# Patient Record
Sex: MALE | Race: WHITE | NOT HISPANIC OR LATINO | Employment: FULL TIME | URBAN - METROPOLITAN AREA
[De-identification: names, ages, dates, MRNs, and addresses within clinical notes are randomized per-mention and may not be internally consistent; named-entity substitution may affect disease eponyms.]

---

## 2017-04-27 ENCOUNTER — OFFICE VISIT (OUTPATIENT)
Dept: INTERNAL MEDICINE | Facility: CLINIC | Age: 57
End: 2017-04-27

## 2017-04-27 VITALS
HEIGHT: 72 IN | WEIGHT: 223 LBS | DIASTOLIC BLOOD PRESSURE: 72 MMHG | BODY MASS INDEX: 30.2 KG/M2 | HEART RATE: 71 BPM | SYSTOLIC BLOOD PRESSURE: 112 MMHG | OXYGEN SATURATION: 97 %

## 2017-04-27 DIAGNOSIS — I10 ESSENTIAL HYPERTENSION: ICD-10-CM

## 2017-04-27 DIAGNOSIS — I25.10 CORONARY ARTERY DISEASE INVOLVING NATIVE CORONARY ARTERY OF NATIVE HEART WITHOUT ANGINA PECTORIS: ICD-10-CM

## 2017-04-27 DIAGNOSIS — Z13.29 SCREENING FOR HYPOTHYROIDISM: ICD-10-CM

## 2017-04-27 DIAGNOSIS — R25.2 MUSCLE CRAMPS: Primary | ICD-10-CM

## 2017-04-27 DIAGNOSIS — E11.9 TYPE 2 DIABETES MELLITUS WITHOUT COMPLICATION, WITHOUT LONG-TERM CURRENT USE OF INSULIN (HCC): ICD-10-CM

## 2017-04-27 DIAGNOSIS — E78.5 HYPERLIPIDEMIA, UNSPECIFIED HYPERLIPIDEMIA TYPE: ICD-10-CM

## 2017-04-27 DIAGNOSIS — Z11.59 NEED FOR HEPATITIS C SCREENING TEST: ICD-10-CM

## 2017-04-27 PROBLEM — I25.5 ISCHEMIC DILATED CARDIOMYOPATHY: Status: ACTIVE | Noted: 2017-04-27

## 2017-04-27 PROBLEM — S83.512A RUPTURE OF ANTERIOR CRUCIATE LIGAMENT OF LEFT KNEE: Status: ACTIVE | Noted: 2017-04-27

## 2017-04-27 PROBLEM — I42.0 ISCHEMIC DILATED CARDIOMYOPATHY: Status: ACTIVE | Noted: 2017-04-27

## 2017-04-27 PROBLEM — Z95.5 S/P CORONARY ARTERY STENT PLACEMENT: Status: ACTIVE | Noted: 2017-04-27

## 2017-04-27 PROBLEM — N52.2 DRUG-INDUCED ERECTILE DYSFUNCTION: Status: ACTIVE | Noted: 2017-04-27

## 2017-04-27 PROBLEM — G47.33 OSA (OBSTRUCTIVE SLEEP APNEA): Status: ACTIVE | Noted: 2017-04-27

## 2017-04-27 PROBLEM — M17.0 OSTEOARTHRITIS OF BOTH KNEES: Status: ACTIVE | Noted: 2017-04-27

## 2017-04-27 PROBLEM — I25.2 PERSONAL HISTORY OF MI (MYOCARDIAL INFARCTION): Status: ACTIVE | Noted: 2017-04-27

## 2017-04-27 PROBLEM — S83.519A ACL (ANTERIOR CRUCIATE LIGAMENT) TEAR: Status: ACTIVE | Noted: 2017-04-27

## 2017-04-27 PROCEDURE — 99204 OFFICE O/P NEW MOD 45 MIN: CPT | Performed by: INTERNAL MEDICINE

## 2017-04-27 RX ORDER — LISINOPRIL 5 MG/1
5 TABLET ORAL DAILY
COMMUNITY
End: 2017-07-18 | Stop reason: SDUPTHER

## 2017-04-27 RX ORDER — CARVEDILOL 6.25 MG/1
6.25 TABLET ORAL 2 TIMES DAILY WITH MEALS
COMMUNITY
End: 2017-07-18 | Stop reason: SDUPTHER

## 2017-04-27 RX ORDER — TADALAFIL 5 MG/1
5 TABLET ORAL DAILY PRN
COMMUNITY
End: 2017-07-05 | Stop reason: SDUPTHER

## 2017-04-27 RX ORDER — ASPIRIN 81 MG/1
81 TABLET ORAL DAILY
COMMUNITY

## 2017-04-27 RX ORDER — FAMOTIDINE 40 MG/1
40 TABLET, FILM COATED ORAL DAILY
COMMUNITY
End: 2017-05-31 | Stop reason: SDUPTHER

## 2017-04-27 NOTE — PROGRESS NOTES
Subjective     Seth Manuel is a 56 y.o. male, who presents with a chief complaint of   Chief Complaint   Patient presents with   • Muscle Pain     ALISTAIR [place].    • Hypertension   • Coronary Artery Disease       HPI Comments: Patient is here to establish care and was previously seen by Dr. Trujillo in [place]. All of these problems are new to me.     CAD s/p stent: Patient had an MI in 2014 and had a stent placed with Dr. Flores in [place]. No CP or SOB currently. He last saw Dr. 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     Cramps in Triceps: He has been on Livalo for one year and gets cramps occasionally. He is currently taking CoQ10. The cramps last about 10 minutes and worse in his triceps and at night.He quit smoking after his heart attack. He has not done anything that makes this better or worse.     HTN: chronic issue and new to me. He does not check it at home. He tolerates the medication well, but doesn't believe that he has high blood pressure    ED: he started having issues with ED after starting the BB, but uses Cialisi everyday    Type 2 Diabetes: chronic issue and new to me. He takes Metformin 500mg BID. He tolerates well without nausea and vomiting and no lows during the day .     AMIRA: chronic issue and diagnosed in late 1990's. This is a new issue to me. He used to use a CPAP, but doesn't anymore. And            The following portions of the patient's history were reviewed and updated as appropriate: allergies, current medications, past family history, past medical history, past social history, past surgical history and problem list.    Allergies: Crestor [rosuvastatin calcium] and Lipitor [atorvastatin]    Current Outpatient Prescriptions:   •  Ascorbic Acid (VITAMIN C PO), Take 3,000 Units by mouth., Disp: , Rfl:   •  aspirin 81 MG EC tablet, Take 81 mg by mouth Daily., Disp: , Rfl:   •   "carvedilol (COREG) 6.25 MG tablet, Take 6.25 mg by mouth 2 (Two) Times a Day With Meals., Disp: , Rfl:   •  Coenzyme Q10 (COQ10 PO), Take 250 mg by mouth 2 (Two) Times a Day., Disp: , Rfl:   •  famotidine (PEPCID) 40 MG tablet, Take 40 mg by mouth Daily., Disp: , Rfl:   •  lisinopril (PRINIVIL,ZESTRIL) 5 MG tablet, Take 5 mg by mouth Daily., Disp: , Rfl:   •  metFORMIN (GLUCOPHAGE) 500 MG tablet, Take 500 mg by mouth 2 (Two) Times a Day With Meals., Disp: , Rfl:   •  pitavastatin calcium (LIVALO) 2 MG tablet tablet, Take 2 mg by mouth Every Night., Disp: , Rfl:   •  tadalafil (CIALIS) 5 MG tablet, Take 5 mg by mouth Daily As Needed for erectile dysfunction., Disp: , Rfl:       Review of Systems   Constitutional: Negative for chills and fever.   HENT: Negative for congestion.    Respiratory: Negative for cough and shortness of breath.    Cardiovascular: Negative for chest pain, palpitations and leg swelling.   Gastrointestinal: Negative for constipation, diarrhea and nausea.   Genitourinary: Negative for difficulty urinating and dysuria.   Musculoskeletal: Positive for arthralgias (Left knee pain and swelling that is chronic ).   Skin: Negative for rash.   Neurological: Negative for headaches.   Psychiatric/Behavioral: Negative for sleep disturbance.       Objective     /72 (BP Location: Left arm, Patient Position: Sitting, Cuff Size: Adult)  Pulse 71  Ht 72\" (182.9 cm)  Wt 223 lb (101 kg)  SpO2 97%  BMI 30.24 kg/m2      Physical Exam   Constitutional: He is oriented to person, place, and time. He appears well-developed and well-nourished. No distress.   HENT:   Head: Normocephalic and atraumatic.   Right Ear: Tympanic membrane and external ear normal.   Left Ear: Tympanic membrane and external ear normal.   Nose: Nose normal.   Mouth/Throat: Oropharynx is clear and moist and mucous membranes are normal. No oropharyngeal exudate. Tonsils are 0 on the right. Tonsils are 0 on the left. No tonsillar exudate. "   Eyes: Conjunctivae are normal. Right eye exhibits no discharge. Left eye exhibits no discharge. No scleral icterus.   Neck: Neck supple.   Cardiovascular: Normal rate, regular rhythm and normal heart sounds.  Exam reveals no gallop and no friction rub.    No murmur heard.  Pulmonary/Chest: Effort normal and breath sounds normal. No respiratory distress. He has no wheezes. He has no rales.   Musculoskeletal: He exhibits no edema or tenderness.   Lymphadenopathy:     He has no cervical adenopathy.   Neurological: He is alert and oriented to person, place, and time.   Skin: Skin is warm. No rash noted.   Psychiatric: He has a normal mood and affect. His behavior is normal.   Nursing note and vitals reviewed.          No results found for this or any previous visit.    Assessment/Plan   Seth was seen today for muscle pain, hypertension and coronary artery disease.    Diagnoses and all orders for this visit:    Muscle cramps  -     Magnesium; Future  -     CK; Future    Hyperlipidemia, unspecified hyperlipidemia type  -     Lipid Panel With LDL / HDL Ratio; Future  -     CBC & Differential; Future  -     Comprehensive Metabolic Panel; Future  -     Lipid Panel With LDL / HDL Ratio; Future    Essential hypertension    Coronary artery disease involving native coronary artery of native heart without angina pectoris  -     Lipid Panel With LDL / HDL Ratio; Future  -     Lipid Panel With LDL / HDL Ratio; Future    Type 2 diabetes mellitus without complication, without long-term current use of insulin  -     CBC & Differential; Future  -     Comprehensive Metabolic Panel; Future  -     Urinalysis With / Culture If Indicated; Future  -     Microalbumin / Creatinine Urine Ratio; Future  -     Hemoglobin A1c; Future  -     Hemoglobin A1c; Future  -     Urinalysis With / Culture If Indicated; Future    Need for hepatitis C screening test  -     HCV Antibody Rfx To Qnt PCR; Future    Screening for hypothyroidism  -     TSH Rfx On  Abnormal To Free T4; Future        HLD/CAD:Patient's cholesterol is under good control per patient. Will continue current regimen of Livalo, ASA and BB. No side effects from medications reported other than some triceps cramping that he is unsure if related to his statin. Will follow up in 6 months to monitor levels. Obtain and review records from Dr. Flores.     HTN:HTN is under good control and patient will continue to monitor with home BP cuff. No side effects from medications. Will continue current regimen of ACE and BB. He is on 5mg of lisinopril and 6.25mg PO BID of coreg. Will follow up in 6 months      Diabetes:Patient's type 2 diabetes is under good control per patients, but has not had levels drawn in 1 year . Will continue current regimen of Metformin 500mg BID.No reported side effects from medications. Will follow up in 6 months. Eye exam is up to date. Needs foot exam at next visit.     Cramps: likely related to Livalo or dehydration. Will check magnesium, potassium and CK to evaluate further.       Return in about 6 months (around 10/27/2017) for Recheck.    Judy Good MD  04/27/2017

## 2017-04-28 ENCOUNTER — RESULTS ENCOUNTER (OUTPATIENT)
Dept: INTERNAL MEDICINE | Facility: CLINIC | Age: 57
End: 2017-04-28

## 2017-04-28 DIAGNOSIS — E78.5 HYPERLIPIDEMIA, UNSPECIFIED HYPERLIPIDEMIA TYPE: ICD-10-CM

## 2017-04-28 DIAGNOSIS — Z11.59 NEED FOR HEPATITIS C SCREENING TEST: ICD-10-CM

## 2017-04-28 DIAGNOSIS — R25.2 MUSCLE CRAMPS: ICD-10-CM

## 2017-04-28 DIAGNOSIS — E11.9 TYPE 2 DIABETES MELLITUS WITHOUT COMPLICATION, WITHOUT LONG-TERM CURRENT USE OF INSULIN (HCC): ICD-10-CM

## 2017-04-28 DIAGNOSIS — Z13.29 SCREENING FOR HYPOTHYROIDISM: ICD-10-CM

## 2017-04-28 DIAGNOSIS — I25.10 CORONARY ARTERY DISEASE INVOLVING NATIVE CORONARY ARTERY OF NATIVE HEART WITHOUT ANGINA PECTORIS: ICD-10-CM

## 2017-04-29 LAB
ALBUMIN SERPL-MCNC: 4.5 G/DL (ref 3.5–5.2)
ALBUMIN/CREAT UR: <3.8 MG/G CREAT (ref 0–30)
ALBUMIN/GLOB SERPL: 2 G/DL
ALP SERPL-CCNC: 46 U/L (ref 40–129)
ALT SERPL-CCNC: 20 U/L (ref 5–41)
APPEARANCE UR: CLEAR
AST SERPL-CCNC: 25 U/L (ref 5–40)
BACTERIA #/AREA URNS HPF: NORMAL /HPF
BASOPHILS # BLD AUTO: 0.06 10*3/MM3 (ref 0–0.2)
BASOPHILS NFR BLD AUTO: 0.8 % (ref 0–2)
BILIRUB SERPL-MCNC: 0.6 MG/DL (ref 0.2–1.2)
BILIRUB UR QL STRIP: NEGATIVE
BUN SERPL-MCNC: 12 MG/DL (ref 6–20)
BUN/CREAT SERPL: 12.5 (ref 7–25)
CALCIUM SERPL-MCNC: 8.9 MG/DL (ref 8.6–10.5)
CHLORIDE SERPL-SCNC: 99 MMOL/L (ref 98–107)
CHOLEST SERPL-MCNC: 196 MG/DL (ref 0–200)
CK SERPL-CCNC: 610 U/L (ref 36–170)
CO2 SERPL-SCNC: 30.4 MMOL/L (ref 22–29)
COLOR UR: YELLOW
CREAT SERPL-MCNC: 0.96 MG/DL (ref 0.76–1.27)
CREAT UR-MCNC: 78.7 MG/DL
EOSINOPHIL # BLD AUTO: 0.34 10*3/MM3 (ref 0.1–0.3)
EOSINOPHIL NFR BLD AUTO: 4.5 % (ref 0–4)
EPI CELLS #/AREA URNS HPF: NORMAL /HPF
ERYTHROCYTE [DISTWIDTH] IN BLOOD BY AUTOMATED COUNT: 13.6 % (ref 11.5–14.5)
GLOBULIN SER CALC-MCNC: 2.2 GM/DL
GLUCOSE SERPL-MCNC: 116 MG/DL (ref 65–99)
GLUCOSE UR QL: NEGATIVE
HBA1C MFR BLD: 6.3 % (ref 4.8–5.6)
HCT VFR BLD AUTO: 48.6 % (ref 42–52)
HCV AB S/CO SERPL IA: <0.1 S/CO RATIO (ref 0–0.9)
HDLC SERPL-MCNC: 48 MG/DL (ref 40–60)
HGB BLD-MCNC: 16.2 G/DL (ref 14–18)
HGB UR QL STRIP: NEGATIVE
IMM GRANULOCYTES # BLD: 0.06 10*3/MM3 (ref 0–0.03)
IMM GRANULOCYTES NFR BLD: 0.8 % (ref 0–0.5)
KETONES UR QL STRIP: NEGATIVE
LDLC SERPL CALC-MCNC: 101 MG/DL (ref 0–100)
LDLC/HDLC SERPL: 2.11 {RATIO}
LEUKOCYTE ESTERASE UR QL STRIP: NEGATIVE
LYMPHOCYTES # BLD AUTO: 1.71 10*3/MM3 (ref 0.6–4.8)
LYMPHOCYTES NFR BLD AUTO: 22.5 % (ref 20–45)
MAGNESIUM SERPL-MCNC: 2.1 MG/DL (ref 1.7–2.5)
MCH RBC QN AUTO: 30.2 PG (ref 27–31)
MCHC RBC AUTO-ENTMCNC: 33.3 G/DL (ref 31–37)
MCV RBC AUTO: 90.5 FL (ref 80–94)
MICRO URNS: NORMAL
MICRO URNS: NORMAL
MICROALBUMIN UR-MCNC: <3 UG/ML
MONOCYTES # BLD AUTO: 0.68 10*3/MM3 (ref 0–1)
MONOCYTES NFR BLD AUTO: 9 % (ref 3–8)
NEUTROPHILS # BLD AUTO: 4.74 10*3/MM3 (ref 1.5–8.3)
NEUTROPHILS NFR BLD AUTO: 62.4 % (ref 45–70)
NITRITE UR QL STRIP: NEGATIVE
NRBC BLD AUTO-RTO: 0 /100 WBC (ref 0–0)
PH UR STRIP: 7 [PH] (ref 5–7.5)
PLATELET # BLD AUTO: 287 10*3/MM3 (ref 140–500)
POTASSIUM SERPL-SCNC: 4.7 MMOL/L (ref 3.5–5.2)
PROT SERPL-MCNC: 6.7 G/DL (ref 6–8.5)
PROT UR QL STRIP: NEGATIVE
RBC # BLD AUTO: 5.37 10*6/MM3 (ref 4.7–6.1)
RBC #/AREA URNS HPF: NORMAL /HPF
SODIUM SERPL-SCNC: 141 MMOL/L (ref 136–145)
SP GR UR: 1.01 (ref 1–1.03)
TRIGL SERPL-MCNC: 233 MG/DL (ref 0–150)
TSH SERPL DL<=0.005 MIU/L-ACNC: 2.17 MIU/ML (ref 0.27–4.2)
URINALYSIS REFLEX: NORMAL
UROBILINOGEN UR STRIP-MCNC: 0.2 MG/DL (ref 0.2–1)
VLDLC SERPL CALC-MCNC: 46.6 MG/DL (ref 8–32)
WBC # BLD AUTO: 7.59 10*3/MM3 (ref 4.8–10.8)
WBC #/AREA URNS HPF: NORMAL /HPF

## 2017-05-01 ENCOUNTER — TELEPHONE (OUTPATIENT)
Dept: INTERNAL MEDICINE | Facility: CLINIC | Age: 57
End: 2017-05-01

## 2017-05-01 DIAGNOSIS — R74.8 ELEVATED CK: Primary | ICD-10-CM

## 2017-05-31 RX ORDER — FAMOTIDINE 40 MG/1
40 TABLET, FILM COATED ORAL DAILY
Qty: 90 TABLET | Refills: 1 | Status: SHIPPED | OUTPATIENT
Start: 2017-05-31 | End: 2017-11-27 | Stop reason: SDUPTHER

## 2017-07-05 RX ORDER — TADALAFIL 5 MG/1
5 TABLET ORAL DAILY PRN
Qty: 90 TABLET | Refills: 1 | Status: SHIPPED | OUTPATIENT
Start: 2017-07-05 | End: 2017-08-10 | Stop reason: SDUPTHER

## 2017-07-18 RX ORDER — CARVEDILOL 6.25 MG/1
6.25 TABLET ORAL 2 TIMES DAILY WITH MEALS
Qty: 180 TABLET | Refills: 1 | Status: SHIPPED | OUTPATIENT
Start: 2017-07-18 | End: 2018-03-21 | Stop reason: SDUPTHER

## 2017-07-18 RX ORDER — LISINOPRIL 5 MG/1
5 TABLET ORAL DAILY
Qty: 90 TABLET | Refills: 1 | Status: SHIPPED | OUTPATIENT
Start: 2017-07-18 | End: 2018-03-21 | Stop reason: SDUPTHER

## 2017-08-10 RX ORDER — TADALAFIL 5 MG/1
5 TABLET ORAL DAILY PRN
Qty: 90 TABLET | Refills: 1 | Status: SHIPPED | OUTPATIENT
Start: 2017-08-10 | End: 2017-10-27 | Stop reason: SDUPTHER

## 2017-08-14 PROBLEM — N52.2 DRUG-INDUCED ERECTILE DYSFUNCTION: Status: RESOLVED | Noted: 2017-08-14 | Resolved: 2017-08-14

## 2017-08-14 PROBLEM — N52.2 DRUG-INDUCED ERECTILE DYSFUNCTION: Status: ACTIVE | Noted: 2017-08-14

## 2017-10-01 ENCOUNTER — OFFICE VISIT (OUTPATIENT)
Dept: RETAIL CLINIC | Facility: CLINIC | Age: 57
End: 2017-10-01

## 2017-10-01 VITALS
SYSTOLIC BLOOD PRESSURE: 110 MMHG | OXYGEN SATURATION: 18 % | HEART RATE: 98 BPM | DIASTOLIC BLOOD PRESSURE: 80 MMHG | TEMPERATURE: 99.8 F

## 2017-10-01 DIAGNOSIS — J01.00 ACUTE MAXILLARY SINUSITIS, RECURRENCE NOT SPECIFIED: Primary | ICD-10-CM

## 2017-10-01 DIAGNOSIS — J01.10 SUBACUTE FRONTAL SINUSITIS: ICD-10-CM

## 2017-10-01 PROBLEM — R50.9 FEVER: Status: ACTIVE | Noted: 2017-10-01

## 2017-10-01 PROBLEM — J32.0 MAXILLARY SINUSITIS: Status: ACTIVE | Noted: 2017-10-01

## 2017-10-01 PROBLEM — J34.9 SINUS PROBLEM: Status: ACTIVE | Noted: 2017-10-01

## 2017-10-01 PROCEDURE — 99213 OFFICE O/P EST LOW 20 MIN: CPT | Performed by: NURSE PRACTITIONER

## 2017-10-01 RX ORDER — DOXYCYCLINE 50 MG/1
100 CAPSULE ORAL 2 TIMES DAILY
Qty: 28 CAPSULE | Refills: 0 | Status: SHIPPED | OUTPATIENT
Start: 2017-10-01 | End: 2017-10-08

## 2017-10-01 NOTE — PATIENT INSTRUCTIONS
Sinusitis, Adult  Sinusitis is soreness and inflammation of your sinuses. Sinuses are hollow spaces in the bones around your face. Your sinuses are located:  · Around your eyes.  · In the middle of your forehead.  · Behind your nose.  · In your cheekbones.  Your sinuses and nasal passages are lined with a stringy fluid (mucus). Mucus normally drains out of your sinuses. When your nasal tissues become inflamed or swollen, the mucus can become trapped or blocked so air cannot flow through your sinuses. This allows bacteria, viruses, and funguses to grow, which leads to infection.  Sinusitis can develop quickly and last for 7-10 days (acute) or for more than 12 weeks (chronic). Sinusitis often develops after a cold.  CAUSES  This condition is caused by anything that creates swelling in the sinuses or stops mucus from draining, including:  · Allergies.  · Asthma.  · Bacterial or viral infection.  · Abnormally shaped bones between the nasal passages.  · Nasal growths that contain mucus (nasal polyps).  · Narrow sinus openings.  · Pollutants, such as chemicals or irritants in the air.  · A foreign object stuck in the nose.  · A fungal infection. This is rare.  RISK FACTORS  The following factors may make you more likely to develop this condition:  · Having allergies or asthma.  · Having had a recent cold or respiratory tract infection.  · Having structural deformities or blockages in your nose or sinuses.  · Having a weak immune system.  · Doing a lot of swimming or diving.  · Overusing nasal sprays.  · Smoking.  SYMPTOMS  The main symptoms of this condition are pain and a feeling of pressure around the affected sinuses. Other symptoms include:  · Upper toothache.  · Earache.  · Headache.  · Bad breath.  · Decreased sense of smell and taste.  · A cough that may get worse at night.  · Fatigue.  · Fever.  · Thick drainage from your nose. The drainage is often green and it may contain pus (purulent).  · Stuffy nose or  congestion.  · Postnasal drip. This is when extra mucus collects in the throat or back of the nose.  · Swelling and warmth over the affected sinuses.  · Sore throat.  · Sensitivity to light.  DIAGNOSIS  This condition is diagnosed based on symptoms, a medical history, and a physical exam. To find out if your condition is acute or chronic, your health care provider may:  · Look in your nose for signs of nasal polyps.  · Tap over the affected sinus to check for signs of infection.  · View the inside of your sinuses using an imaging device that has a light attached (endoscope).  If your health care provider suspects that you have chronic sinusitis, you may also:  · Be tested for allergies.  · Have a sample of mucus taken from your nose (nasal culture) and checked for bacteria.  · Have a mucus sample examined to see if your sinusitis is related to an allergy.  If your sinusitis does not respond to treatment and it lasts longer than 8 weeks, you may have an MRI or CT scan to check your sinuses. These scans also help to determine how severe your infection is.  In rare cases, a bone biopsy may be done to rule out more serious types of fungal sinus disease.  TREATMENT  Treatment for sinusitis depends on the cause and whether your condition is chronic or acute. If a virus is causing your sinusitis, your symptoms will go away on their own within 10 days. You may be given medicines to relieve your symptoms, including:  · Topical nasal decongestants. They shrink swollen nasal passages and let mucus drain from your sinuses.  · Antihistamines. These drugs block inflammation that is triggered by allergies. This can help to ease swelling in your nose and sinuses.  · Topical nasal corticosteroids. These are nasal sprays that ease inflammation and swelling in your nose and sinuses.  · Nasal saline washes. These rinses can help to get rid of thick mucus in your nose.  If your condition is caused by bacteria, you will be given an  antibiotic medicine. If your condition is caused by a fungus, you will be given an antifungal medicine.  Surgery may be needed to correct underlying conditions, such as narrow nasal passages. Surgery may also be needed to remove polyps.  HOME CARE INSTRUCTIONS  Medicines  · Take, use, or apply over-the-counter and prescription medicines only as told by your health care provider. These may include nasal sprays.  · If you were prescribed an antibiotic medicine, take it as told by your health care provider. Do not stop taking the antibiotic even if you start to feel better.  Hydrate and Humidify  · Drink enough water to keep your urine clear or pale yellow. Staying hydrated will help to thin your mucus.  · Use a cool mist humidifier to keep the humidity level in your home above 50%.  · Inhale steam for 10-15 minutes, 3-4 times a day or as told by your health care provider. You can do this in the bathroom while a hot shower is running.  · Limit your exposure to cool or dry air.  Rest  · Rest as much as possible.  · Sleep with your head raised (elevated).  · Make sure to get enough sleep each night.  General Instructions  · Apply a warm, moist washcloth to your face 3-4 times a day or as told by your health care provider. This will help with discomfort.  · Wash your hands often with soap and water to reduce your exposure to viruses and other germs. If soap and water are not available, use hand .  · Do not smoke. Avoid being around people who are smoking (secondhand smoke).  · Keep all follow-up visits as told by your health care provider. This is important.  SEEK MEDICAL CARE IF:  · You have a fever.  · Your symptoms get worse.  · Your symptoms do not improve within 10 days.  SEEK IMMEDIATE MEDICAL CARE IF:  · You have a severe headache.  · You have persistent vomiting.  · You have pain or swelling around your face or eyes.  · You have vision problems.  · You develop confusion.  · Your neck is stiff.  · You have  trouble breathing.     This information is not intended to replace advice given to you by your health care provider. Make sure you discuss any questions you have with your health care provider.     Document Released: 12/18/2006 Document Revised: 04/10/2017 Document Reviewed: 10/12/2016  [employer] Interactive Patient Education ©2017 [employer]  Talked to the patient about the diagnosis and educate the patient and advise to visit to PCP if the symptoms worsens

## 2017-10-01 NOTE — PROGRESS NOTES
Subjective   Seth Manuel is a 57 y.o. male.     Sinus Problem   This is a new problem. The current episode started in the past 7 days. The maximum temperature recorded prior to his arrival was 100.4 - 100.9 F. The fever has been present for 3 to 4 days. His pain is at a severity of 5/10. The pain is moderate. Associated symptoms include congestion, headaches and sinus pressure. Pertinent negatives include no neck pain, shortness of breath, sneezing or swollen glands. Past treatments include acetaminophen. The treatment provided mild relief.   Fever    This is a new problem. The current episode started in the past 7 days. The maximum temperature noted was 100 to 100.9 F. The temperature was taken using an oral thermometer. Associated symptoms include congestion and headaches. Pertinent negatives include no wheezing. He has tried acetaminophen for the symptoms. The treatment provided mild relief.        The following portions of the patient's history were reviewed and updated as appropriate: allergies, current medications, past family history, past medical history, past social history, past surgical history and problem list.    Review of Systems   Constitutional: Positive for fever.   HENT: Positive for congestion, postnasal drip and sinus pressure. Negative for sneezing and voice change.    Eyes: Negative.    Respiratory: Negative.  Negative for shortness of breath and wheezing.    Cardiovascular: Negative.    Gastrointestinal: Negative.    Musculoskeletal: Negative for neck pain.   Neurological: Positive for headaches.       Objective   Physical Exam   Constitutional: He appears well-developed and well-nourished.   HENT:   Head: Normocephalic and atraumatic.   Right Ear: Tympanic membrane normal.   Left Ear: Tympanic membrane normal.   Nose: Mucosal edema present. Right sinus exhibits maxillary sinus tenderness and frontal sinus tenderness. Left sinus exhibits maxillary sinus tenderness and frontal sinus tenderness.    Mouth/Throat: No oropharyngeal exudate, posterior oropharyngeal edema, posterior oropharyngeal erythema or tonsillar abscesses.   Eyes: Pupils are equal, round, and reactive to light.   Neck: Normal range of motion.   Cardiovascular: Normal rate, regular rhythm and normal heart sounds.    Pulmonary/Chest: Effort normal and breath sounds normal. No respiratory distress. He has no decreased breath sounds. He has no wheezes. He has no rhonchi. He has no rales.   Abdominal: Soft. Bowel sounds are normal.   Nursing note and vitals reviewed.      Assessment/Plan   Seth was seen today for sinus problem and fever.    Diagnoses and all orders for this visit:    Acute maxillary sinusitis, recurrence not specified  -     doxycycline (MONODOX) 50 MG capsule; Take 2 capsules by mouth 2 (Two) Times a Day for 7 days.    Subacute frontal sinusitis  -     doxycycline (MONODOX) 50 MG capsule; Take 2 capsules by mouth 2 (Two) Times a Day for 7 days.      Talked to the patient about the diagnosis and educate the patient and advise to visit to PCP if the symptoms worsens

## 2017-10-18 DIAGNOSIS — E11.9 TYPE 2 DIABETES MELLITUS WITHOUT COMPLICATION, WITHOUT LONG-TERM CURRENT USE OF INSULIN (HCC): ICD-10-CM

## 2017-10-18 DIAGNOSIS — E78.5 HYPERLIPIDEMIA, UNSPECIFIED HYPERLIPIDEMIA TYPE: ICD-10-CM

## 2017-10-18 DIAGNOSIS — R74.8 ELEVATED CK: ICD-10-CM

## 2017-10-18 DIAGNOSIS — I10 ESSENTIAL HYPERTENSION: Primary | ICD-10-CM

## 2017-10-20 ENCOUNTER — LAB (OUTPATIENT)
Dept: INTERNAL MEDICINE | Facility: CLINIC | Age: 57
End: 2017-10-20

## 2017-10-20 DIAGNOSIS — E78.5 HYPERLIPIDEMIA, UNSPECIFIED HYPERLIPIDEMIA TYPE: ICD-10-CM

## 2017-10-20 DIAGNOSIS — I25.10 CORONARY ARTERY DISEASE INVOLVING NATIVE CORONARY ARTERY OF NATIVE HEART WITHOUT ANGINA PECTORIS: ICD-10-CM

## 2017-10-20 DIAGNOSIS — Z12.5 PROSTATE CANCER SCREENING: Primary | ICD-10-CM

## 2017-10-20 DIAGNOSIS — E11.9 TYPE 2 DIABETES MELLITUS WITHOUT COMPLICATION, WITHOUT LONG-TERM CURRENT USE OF INSULIN (HCC): ICD-10-CM

## 2017-10-23 LAB
ALBUMIN SERPL-MCNC: 4.5 G/DL (ref 3.5–5.2)
ALBUMIN/GLOB SERPL: 1.9 G/DL
ALP SERPL-CCNC: 57 U/L (ref 40–129)
ALT SERPL-CCNC: 24 U/L (ref 5–41)
APPEARANCE UR: CLEAR
AST SERPL-CCNC: 25 U/L (ref 5–40)
BACTERIA #/AREA URNS HPF: NORMAL /HPF
BACTERIA UR CULT: NORMAL
BACTERIA UR CULT: NORMAL
BASOPHILS # BLD AUTO: 0.05 10*3/MM3 (ref 0–0.2)
BASOPHILS NFR BLD AUTO: 0.6 % (ref 0–2)
BILIRUB SERPL-MCNC: 0.8 MG/DL (ref 0.2–1.2)
BILIRUB UR QL STRIP: NEGATIVE
BUN SERPL-MCNC: 18 MG/DL (ref 6–20)
BUN/CREAT SERPL: 15.3 (ref 7–25)
CALCIUM SERPL-MCNC: 9.5 MG/DL (ref 8.6–10.5)
CHLORIDE SERPL-SCNC: 100 MMOL/L (ref 98–107)
CHOLEST SERPL-MCNC: 202 MG/DL (ref 0–200)
CK SERPL-CCNC: 332 U/L (ref 36–170)
CO2 SERPL-SCNC: 27.9 MMOL/L (ref 22–29)
COLOR UR: YELLOW
CREAT SERPL-MCNC: 1.18 MG/DL (ref 0.76–1.27)
EOSINOPHIL # BLD AUTO: 0.43 10*3/MM3 (ref 0.1–0.3)
EOSINOPHIL NFR BLD AUTO: 4.9 % (ref 0–4)
EPI CELLS #/AREA URNS HPF: NORMAL /HPF
ERYTHROCYTE [DISTWIDTH] IN BLOOD BY AUTOMATED COUNT: 14.1 % (ref 11.5–14.5)
GFR SERPLBLD CREATININE-BSD FMLA CKD-EPI: 64 ML/MIN/1.73
GFR SERPLBLD CREATININE-BSD FMLA CKD-EPI: 77 ML/MIN/1.73
GLOBULIN SER CALC-MCNC: 2.4 GM/DL
GLUCOSE SERPL-MCNC: 103 MG/DL (ref 65–99)
GLUCOSE UR QL: NEGATIVE
HBA1C MFR BLD: 6.2 % (ref 4.8–5.6)
HCT VFR BLD AUTO: 49.2 % (ref 42–52)
HDLC SERPL-MCNC: 50 MG/DL (ref 40–60)
HGB BLD-MCNC: 16.4 G/DL (ref 14–18)
HGB UR QL STRIP: NEGATIVE
IMM GRANULOCYTES # BLD: 0.04 10*3/MM3 (ref 0–0.03)
IMM GRANULOCYTES NFR BLD: 0.5 % (ref 0–0.5)
KETONES UR QL STRIP: NEGATIVE
LDLC SERPL CALC-MCNC: 99 MG/DL (ref 0–100)
LDLC/HDLC SERPL: 1.97 {RATIO}
LEUKOCYTE ESTERASE UR QL STRIP: ABNORMAL
LYMPHOCYTES # BLD AUTO: 2.14 10*3/MM3 (ref 0.6–4.8)
LYMPHOCYTES NFR BLD AUTO: 24.6 % (ref 20–45)
MCH RBC QN AUTO: 30.6 PG (ref 27–31)
MCHC RBC AUTO-ENTMCNC: 33.3 G/DL (ref 31–37)
MCV RBC AUTO: 91.8 FL (ref 80–94)
MICRO URNS: ABNORMAL
MONOCYTES # BLD AUTO: 0.78 10*3/MM3 (ref 0–1)
MONOCYTES NFR BLD AUTO: 9 % (ref 3–8)
NEUTROPHILS # BLD AUTO: 5.27 10*3/MM3 (ref 1.5–8.3)
NEUTROPHILS NFR BLD AUTO: 60.4 % (ref 45–70)
NITRITE UR QL STRIP: NEGATIVE
NRBC BLD AUTO-RTO: 0 /100 WBC (ref 0–0)
PH UR STRIP: 6.5 [PH] (ref 5–7.5)
PLATELET # BLD AUTO: 244 10*3/MM3 (ref 140–500)
POTASSIUM SERPL-SCNC: 4.2 MMOL/L (ref 3.5–5.2)
PROT SERPL-MCNC: 6.9 G/DL (ref 6–8.5)
PROT UR QL STRIP: NEGATIVE
PSA SERPL-MCNC: 0.59 NG/ML (ref 0–4)
RBC # BLD AUTO: 5.36 10*6/MM3 (ref 4.7–6.1)
RBC #/AREA URNS HPF: NORMAL /HPF
SODIUM SERPL-SCNC: 143 MMOL/L (ref 136–145)
SP GR UR: 1.01 (ref 1–1.03)
TRIGL SERPL-MCNC: 267 MG/DL (ref 0–150)
URINALYSIS REFLEX: ABNORMAL
UROBILINOGEN UR STRIP-MCNC: 0.2 MG/DL (ref 0.2–1)
VLDLC SERPL CALC-MCNC: 53.4 MG/DL (ref 8–32)
WBC # BLD AUTO: 8.71 10*3/MM3 (ref 4.8–10.8)
WBC #/AREA URNS HPF: NORMAL /HPF

## 2017-10-27 ENCOUNTER — OFFICE VISIT (OUTPATIENT)
Dept: INTERNAL MEDICINE | Facility: CLINIC | Age: 57
End: 2017-10-27

## 2017-10-27 VITALS
SYSTOLIC BLOOD PRESSURE: 118 MMHG | WEIGHT: 222 LBS | HEART RATE: 90 BPM | HEIGHT: 72 IN | OXYGEN SATURATION: 99 % | DIASTOLIC BLOOD PRESSURE: 76 MMHG | BODY MASS INDEX: 30.07 KG/M2

## 2017-10-27 DIAGNOSIS — E78.5 HYPERLIPIDEMIA, UNSPECIFIED HYPERLIPIDEMIA TYPE: ICD-10-CM

## 2017-10-27 DIAGNOSIS — E11.9 TYPE 2 DIABETES MELLITUS WITHOUT COMPLICATION, WITHOUT LONG-TERM CURRENT USE OF INSULIN (HCC): Primary | ICD-10-CM

## 2017-10-27 DIAGNOSIS — N52.2 DRUG-INDUCED ERECTILE DYSFUNCTION: ICD-10-CM

## 2017-10-27 DIAGNOSIS — I25.10 CORONARY ARTERY DISEASE INVOLVING NATIVE CORONARY ARTERY OF NATIVE HEART WITHOUT ANGINA PECTORIS: ICD-10-CM

## 2017-10-27 DIAGNOSIS — I10 ESSENTIAL HYPERTENSION: ICD-10-CM

## 2017-10-27 DIAGNOSIS — J30.9 CHRONIC ALLERGIC RHINITIS, UNSPECIFIED SEASONALITY, UNSPECIFIED TRIGGER: ICD-10-CM

## 2017-10-27 PROBLEM — R50.9 FEVER: Status: RESOLVED | Noted: 2017-10-01 | Resolved: 2017-10-27

## 2017-10-27 PROBLEM — J32.0 MAXILLARY SINUSITIS: Status: RESOLVED | Noted: 2017-10-01 | Resolved: 2017-10-27

## 2017-10-27 PROBLEM — J34.9 SINUS PROBLEM: Status: RESOLVED | Noted: 2017-10-01 | Resolved: 2017-10-27

## 2017-10-27 PROCEDURE — 99214 OFFICE O/P EST MOD 30 MIN: CPT | Performed by: INTERNAL MEDICINE

## 2017-10-27 RX ORDER — TADALAFIL 5 MG/1
5 TABLET ORAL DAILY PRN
Qty: 30 TABLET | Refills: 6 | Status: SHIPPED | OUTPATIENT
Start: 2017-10-27 | End: 2017-11-06 | Stop reason: SDUPTHER

## 2017-10-27 RX ORDER — MONTELUKAST SODIUM 10 MG/1
10 TABLET ORAL NIGHTLY
Qty: 30 TABLET | Refills: 6 | Status: SHIPPED | OUTPATIENT
Start: 2017-10-27 | End: 2017-11-29 | Stop reason: SDUPTHER

## 2017-10-27 RX ORDER — TADALAFIL 5 MG/1
5 TABLET ORAL DAILY PRN
Qty: 30 TABLET | Refills: 6 | Status: SHIPPED | OUTPATIENT
Start: 2017-10-27 | End: 2017-10-27 | Stop reason: SDUPTHER

## 2017-10-27 NOTE — PATIENT INSTRUCTIONS
"Food Choices to Lower Your Triglycerides  Triglycerides are a type of fat in your blood. High levels of triglycerides can increase the risk of heart disease and stroke. If your triglyceride levels are high, the foods you eat and your eating habits are very important. Choosing the right foods can help lower your triglycerides.   WHAT GENERAL GUIDELINES DO I NEED TO FOLLOW?  · Lose weight if you are overweight.    · Limit or avoid alcohol.    · Fill one half of your plate with vegetables and green salads.    · Limit fruit to two servings a day. Choose fruit instead of juice.    · Make one fourth of your plate whole grains. Look for the word \"whole\" as the first word in the ingredient list.  · Fill one fourth of your plate with lean protein foods.  · Enjoy fatty fish (such as salmon, mackerel, sardines, and tuna) three times a week.    · Choose healthy fats.    · Limit foods high in starch and sugar.  · Eat more home-cooked food and less restaurant, buffet, and fast food.  · Limit fried foods.  · Cook foods using methods other than frying.  · Limit saturated fats.  · Check ingredient lists to avoid foods with partially hydrogenated oils (trans fats) in them.  WHAT FOODS CAN I EAT?   Grains  Whole grains, such as whole wheat or whole grain breads, crackers, cereals, and pasta. Unsweetened oatmeal, bulgur, barley, quinoa, or brown rice. Corn or whole wheat flour tortillas.   Vegetables  Fresh or frozen vegetables (raw, steamed, roasted, or grilled). Green salads.  Fruits  All fresh, canned (in natural juice), or frozen fruits.  Meat and Other Protein Products  Ground beef (85% or leaner), grass-fed beef, or beef trimmed of fat. Skinless chicken or turkey. Ground chicken or turkey. Pork trimmed of fat. All fish and seafood. Eggs. Dried beans, peas, or lentils. Unsalted nuts or seeds. Unsalted canned or dry beans.  Dairy  Low-fat dairy products, such as skim or 1% milk, 2% or reduced-fat cheeses, low-fat ricotta or cottage " cheese, or plain low-fat yogurt.  Fats and Oils  Tub margarines without trans fats. Light or reduced-fat mayonnaise and salad dressings. Avocado. Safflower, olive, or canola oils. Natural peanut or almond butter.  The items listed above may not be a complete list of recommended foods or beverages. Contact your dietitian for more options.  WHAT FOODS ARE NOT RECOMMENDED?   Grains  White bread. White pasta. White rice. Cornbread. Bagels, pastries, and croissants. Crackers that contain trans fat.  Vegetables  White potatoes. Corn. Creamed or fried vegetables. Vegetables in a cheese sauce.  Fruits  Dried fruits. Canned fruit in light or heavy syrup. Fruit juice.  Meat and Other Protein Products  Fatty cuts of meat. Ribs, chicken wings, correia, sausage, bologna, salami, chitterlings, fatback, hot dogs, bratwurst, and packaged luncheon meats.  Dairy  Whole or 2% milk, cream, half-and-half, and cream cheese. Whole-fat or sweetened yogurt. Full-fat cheeses. Nondairy creamers and whipped toppings. Processed cheese, cheese spreads, or cheese curds.  Sweets and Desserts  Corn syrup, sugars, honey, and molasses. Candy. Jam and jelly. Syrup. Sweetened cereals. Cookies, pies, cakes, donuts, muffins, and ice cream.  Fats and Oils  Butter, stick margarine, lard, shortening, ghee, or correia fat. Coconut, palm kernel, or palm oils.  Beverages  Alcohol. Sweetened drinks (such as sodas, lemonade, and fruit drinks or punches).  The items listed above may not be a complete list of foods and beverages to avoid. Contact your dietitian for more information.     This information is not intended to replace advice given to you by your health care provider. Make sure you discuss any questions you have with your health care provider.     Document Released: 10/05/2005 Document Revised: 01/08/2016 Document Reviewed: 10/22/2014  [employer] Interactive Patient Education ©2017 [employer]

## 2017-10-27 NOTE — PROGRESS NOTES
Subjective     Seth Manuel is a 57 y.o. male, who presents with a chief complaint of   Chief Complaint   Patient presents with   • Follow-up     6 month f/u, lab results   • Hypertension       HPI Comments: 56 yo M here for follow. Very upset that I did not draw testerone level today. He has a history of CAD s/p stent.  Patient had an MI in 2014 and had a stent placed with Dr. Flores in [place]. No CP or SOB currently.       HTN is a chronic issue. He tolerates the medication well with no side effects.      ED after starting the BB, but uses Cialisi everyday. Tolerates well with no issue and helps.      Type 2 Diabetes is a chronic issue. He takes Metformin 500mg BID. He tolerates well without nausea and vomiting and no lows during the day .             The following portions of the patient's history were reviewed and updated as appropriate: allergies, current medications, past family history, past medical history, past social history, past surgical history and problem list.    Allergies: Crestor [rosuvastatin calcium] and Lipitor [atorvastatin]    Current Outpatient Prescriptions:   •  Ascorbic Acid (VITAMIN C PO), Take 3,000 Units by mouth., Disp: , Rfl:   •  aspirin 81 MG EC tablet, Take 81 mg by mouth Daily., Disp: , Rfl:   •  carvedilol (COREG) 6.25 MG tablet, Take 1 tablet by mouth 2 (Two) Times a Day With Meals., Disp: 180 tablet, Rfl: 1  •  Coenzyme Q10 (COQ10 PO), Take 250 mg by mouth 2 (Two) Times a Day., Disp: , Rfl:   •  famotidine (PEPCID) 40 MG tablet, Take 1 tablet by mouth Daily., Disp: 90 tablet, Rfl: 1  •  lisinopril (PRINIVIL,ZESTRIL) 5 MG tablet, Take 1 tablet by mouth Daily., Disp: 90 tablet, Rfl: 1  •  metFORMIN (GLUCOPHAGE) 500 MG tablet, Take 1 tablet by mouth 2 (Two) Times a Day With Meals., Disp: 180 tablet, Rfl: 1  •  pitavastatin calcium (LIVALO) 2 MG tablet tablet, Take 1 tablet by mouth Every Night., Disp: 90 tablet, Rfl: 1  •  tadalafil (CIALIS) 5 MG tablet, Take 1 tablet by mouth Daily As  "Needed for erectile dysfunction., Disp: 30 tablet, Rfl: 6  •  montelukast (SINGULAIR) 10 MG tablet, Take 1 tablet by mouth Every Night., Disp: 30 tablet, Rfl: 6  Medications Discontinued During This Encounter   Medication Reason   • tadalafil (CIALIS) 5 MG tablet Reorder   • tadalafil (CIALIS) 5 MG tablet Reorder       Review of Systems   Constitutional: Negative for fatigue and fever.   Respiratory: Negative for cough and shortness of breath.    Cardiovascular: Negative for chest pain and palpitations.       Objective     /76 (BP Location: Left arm, Patient Position: Sitting, Cuff Size: Adult)  Pulse 90  Ht 72\" (182.9 cm)  Wt 222 lb (101 kg)  SpO2 99%  BMI 30.11 kg/m2      Physical Exam   Constitutional: He is oriented to person, place, and time. He appears well-developed and well-nourished. No distress.   HENT:   Head: Normocephalic and atraumatic.   Right Ear: External ear normal.   Left Ear: External ear normal.   Mouth/Throat: Oropharynx is clear and moist. No oropharyngeal exudate.   Eyes: Conjunctivae are normal. Right eye exhibits no discharge. Left eye exhibits no discharge. No scleral icterus.   Neck: Neck supple.   Cardiovascular: Normal rate, regular rhythm and normal heart sounds.  Exam reveals no gallop and no friction rub.    No murmur heard.  Pulmonary/Chest: Effort normal and breath sounds normal. No respiratory distress. He has no wheezes. He has no rales.    Seth had a diabetic foot exam performed today.   During the foot exam he had a monofilament test performed.    Neurological Sensory Findings - Unaltered hot/cold right ankle/foot discrimination and unaltered hot/cold left ankle/foot discrimination. Unaltered sharp/dull right ankle/foot discrimination and unaltered sharp/dull left ankle/foot discrimination. No right ankle/foot altered proprioception and no left ankle/foot altered proprioception    Vascular Status -  His exam exhibits right foot vasculature normal. His exam exhibits " no right foot edema. His exam exhibits left foot vasculature normal. His exam exhibits no left foot edema.  Lymphadenopathy:     He has no cervical adenopathy.   Neurological: He is alert and oriented to person, place, and time.   Skin: Skin is warm. No rash noted.   Psychiatric: He has a normal mood and affect. His behavior is normal.   Nursing note and vitals reviewed.        Results for orders placed or performed in visit on 10/18/17   Urine culture, Comprehensive   Result Value Ref Range    Urine Culture Final report     Result 1 Comment    CK   Result Value Ref Range    Creatine Kinase 332 (H) 36 - 170 U/L   Comprehensive metabolic panel   Result Value Ref Range    Glucose 103 (H) 65 - 99 mg/dL    BUN 18 6 - 20 mg/dL    Creatinine 1.18 0.76 - 1.27 mg/dL    eGFR Non African Am 64 >60 mL/min/1.73    eGFR African Am 77 >60 mL/min/1.73    BUN/Creatinine Ratio 15.3 7.0 - 25.0    Sodium 143 136 - 145 mmol/L    Potassium 4.2 3.5 - 5.2 mmol/L    Chloride 100 98 - 107 mmol/L    Total CO2 27.9 22.0 - 29.0 mmol/L    Calcium 9.5 8.6 - 10.5 mg/dL    Total Protein 6.9 6.0 - 8.5 g/dL    Albumin 4.50 3.50 - 5.20 g/dL    Globulin 2.4 gm/dL    A/G Ratio 1.9 g/dL    Total Bilirubin 0.8 0.2 - 1.2 mg/dL    Alkaline Phosphatase 57 40 - 129 U/L    AST (SGOT) 25 5 - 40 U/L    ALT (SGPT) 24 5 - 41 U/L   Lipid Panel With LDL / HDL Ratio   Result Value Ref Range    Total Cholesterol 202 (H) 0 - 200 mg/dL    Triglycerides 267 (H) 0 - 150 mg/dL    HDL Cholesterol 50 40 - 60 mg/dL    VLDL Cholesterol 53.4 (H) 8 - 32 mg/dL    LDL Cholesterol  99 0 - 100 mg/dL    LDL/HDL Ratio 1.97    Urinalysis With / Culture If Indicated - Urine, Clean Catch   Result Value Ref Range    Specific Gravity, UA 1.011 1.005 - 1.030    pH, UA 6.5 5.0 - 7.5    Color, UA Yellow Yellow    Appearance, UA Clear Clear    Leukocytes, UA 1+ (A) Negative    Protein Negative Negative/Trace    Glucose, UA Negative Negative    Ketones Negative Negative    Blood, UA Negative  Negative    Bilirubin, UA Negative Negative    Urobilinogen, UA 0.2 0.2 - 1.0 mg/dL    Nitrite, UA Negative Negative    Microscopic Examination See below:     Urinalysis Reflex Comment    Hemoglobin A1c   Result Value Ref Range    Hemoglobin A1C 6.20 (H) 4.80 - 5.60 %   Microscopic Examination   Result Value Ref Range    WBC, UA 0-5 0 - 5 /hpf    RBC, UA 0-2 0 - 2 /hpf    Epithelial Cells (non renal) 0-10 0 - 10 /hpf    Bacteria, UA None seen None seen/Few /hpf   PSA   Result Value Ref Range    PSA 0.589 0.000 - 4.000 ng/mL   CBC & Differential   Result Value Ref Range    WBC 8.71 4.80 - 10.80 10*3/mm3    RBC 5.36 4.70 - 6.10 10*6/mm3    Hemoglobin 16.4 14.0 - 18.0 g/dL    Hematocrit 49.2 42.0 - 52.0 %    MCV 91.8 80.0 - 94.0 fL    MCH 30.6 27.0 - 31.0 pg    MCHC 33.3 31.0 - 37.0 g/dL    RDW 14.1 11.5 - 14.5 %    Platelets 244 140 - 500 10*3/mm3    Neutrophil Rel % 60.4 45.0 - 70.0 %    Lymphocyte Rel % 24.6 20.0 - 45.0 %    Monocyte Rel % 9.0 (H) 3.0 - 8.0 %    Eosinophil Rel % 4.9 (H) 0.0 - 4.0 %    Basophil Rel % 0.6 0.0 - 2.0 %    Neutrophils Absolute 5.27 1.50 - 8.30 10*3/mm3    Lymphocytes Absolute 2.14 0.60 - 4.80 10*3/mm3    Monocytes Absolute 0.78 0.00 - 1.00 10*3/mm3    Eosinophils Absolute 0.43 (H) 0.10 - 0.30 10*3/mm3    Basophils Absolute 0.05 0.00 - 0.20 10*3/mm3    Immature Granulocyte Rel % 0.5 0.0 - 0.5 %    Immature Grans Absolute 0.04 (H) 0.00 - 0.03 10*3/mm3    nRBC 0.0 0.0 - 0.0 /100 WBC       Assessment/Plan   Seth was seen today for follow-up and hypertension.    Diagnoses and all orders for this visit:    Type 2 diabetes mellitus without complication, without long-term current use of insulin    Drug-induced erectile dysfunction    Hyperlipidemia, unspecified hyperlipidemia type    Essential hypertension    Coronary artery disease involving native coronary artery of native heart without angina pectoris    Chronic allergic rhinitis, unspecified seasonality, unspecified trigger    Other  orders  -     Discontinue: tadalafil (CIALIS) 5 MG tablet; Take 1 tablet by mouth Daily As Needed for erectile dysfunction.  -     tadalafil (CIALIS) 5 MG tablet; Take 1 tablet by mouth Daily As Needed for erectile dysfunction.  -     montelukast (SINGULAIR) 10 MG tablet; Take 1 tablet by mouth Every Night.      Patient's type 2 diabetes is under good control . Will continue current regimen of Metformin.No reported side effects from medications. Will follow up in 6 months. Eye and foot exam are up to date. He is on ACE and takes ASA 81mg.     HTN is under good control and patient will continue to monitor with home BP cuff. No side effects from medications. Will continue current regimen of lisinopril and coreg at current doses. Will follow up in 6 months.      Patient's cholesterol is under good control, but TG's are high and provided info on how to reduce. Will continue current regimen of Livalo at current dose. No side effects from medications reported. Will follow up in 6 months to monitor levels.     He has refused to get flu shot today and pneumococcal.     He has also refused to establish care with cardiologist due to his heart doctor in [place] being so good that he doesn't think that anyone can meet his standards. He is interested in me checking testosterone although no concerns for hypogonadism and I refused as I think that he is a poor candidate for testosterone therapy given his HLD, history of MI and untreated AMIRA. I am afraid that it will cause more problems and greater risks to the patient. I would like him to establish with a cardiologist as well as have them discuss with him those risk before we proceed with checking.     His ED is drug induced (the patient admits) and is stable on Cialis.      Singulair will be added to current regimen for allergies. He has tried Zyrtec and Flonase with no help.  Refuses to see allergist today.     Return in about 6 months (around 4/27/2018) for Recheck.    Judy  SATNAM Good MD  10/27/2017

## 2017-11-06 RX ORDER — TADALAFIL 5 MG/1
5 TABLET ORAL DAILY PRN
Qty: 90 TABLET | Refills: 2 | Status: SHIPPED | OUTPATIENT
Start: 2017-11-06 | End: 2018-08-21 | Stop reason: SDUPTHER

## 2017-11-27 RX ORDER — FAMOTIDINE 40 MG/1
40 TABLET, FILM COATED ORAL DAILY
Qty: 90 TABLET | Refills: 1 | Status: SHIPPED | OUTPATIENT
Start: 2017-11-27 | End: 2018-05-29 | Stop reason: SDUPTHER

## 2017-11-29 RX ORDER — MONTELUKAST SODIUM 10 MG/1
10 TABLET ORAL NIGHTLY
Qty: 90 TABLET | Refills: 1 | Status: SHIPPED | OUTPATIENT
Start: 2017-11-29 | End: 2018-05-11 | Stop reason: SDUPTHER

## 2018-03-21 RX ORDER — LISINOPRIL 5 MG/1
5 TABLET ORAL DAILY
Qty: 90 TABLET | Refills: 1 | Status: SHIPPED | OUTPATIENT
Start: 2018-03-21 | End: 2018-09-17 | Stop reason: SDUPTHER

## 2018-03-21 RX ORDER — CARVEDILOL 6.25 MG/1
6.25 TABLET ORAL 2 TIMES DAILY WITH MEALS
Qty: 180 TABLET | Refills: 1 | Status: SHIPPED | OUTPATIENT
Start: 2018-03-21 | End: 2018-05-08 | Stop reason: SDUPTHER

## 2018-04-21 LAB
ALBUMIN SERPL-MCNC: 4.8 G/DL (ref 3.5–5.2)
ALBUMIN/GLOB SERPL: 2.1 G/DL
ALP SERPL-CCNC: 47 U/L (ref 40–129)
ALT SERPL-CCNC: 14 U/L (ref 5–41)
APPEARANCE UR: CLEAR
AST SERPL-CCNC: 22 U/L (ref 5–40)
BACTERIA #/AREA URNS HPF: NORMAL /HPF
BASOPHILS # BLD AUTO: 0.06 10*3/MM3 (ref 0–0.2)
BASOPHILS NFR BLD AUTO: 0.7 % (ref 0–2)
BILIRUB SERPL-MCNC: 0.6 MG/DL (ref 0.2–1.2)
BILIRUB UR QL STRIP: NEGATIVE
BUN SERPL-MCNC: 13 MG/DL (ref 6–20)
BUN/CREAT SERPL: 12.4 (ref 7–25)
CALCIUM SERPL-MCNC: 9.9 MG/DL (ref 8.6–10.5)
CHLORIDE SERPL-SCNC: 96 MMOL/L (ref 98–107)
CHOLEST SERPL-MCNC: 182 MG/DL (ref 0–200)
CO2 SERPL-SCNC: 29.8 MMOL/L (ref 22–29)
COLOR UR: YELLOW
CREAT SERPL-MCNC: 1.05 MG/DL (ref 0.76–1.27)
EOSINOPHIL # BLD AUTO: 0.14 10*3/MM3 (ref 0.1–0.3)
EOSINOPHIL NFR BLD AUTO: 1.7 % (ref 0–4)
EPI CELLS #/AREA URNS HPF: NORMAL /HPF
ERYTHROCYTE [DISTWIDTH] IN BLOOD BY AUTOMATED COUNT: 13.1 % (ref 11.5–14.5)
GFR SERPLBLD CREATININE-BSD FMLA CKD-EPI: 73 ML/MIN/1.73
GFR SERPLBLD CREATININE-BSD FMLA CKD-EPI: 88 ML/MIN/1.73
GLOBULIN SER CALC-MCNC: 2.3 GM/DL
GLUCOSE SERPL-MCNC: 87 MG/DL (ref 65–99)
GLUCOSE UR QL: NEGATIVE
HBA1C MFR BLD: 6.1 % (ref 4.8–5.6)
HCT VFR BLD AUTO: 50.1 % (ref 42–52)
HDLC SERPL-MCNC: 46 MG/DL (ref 40–60)
HGB BLD-MCNC: 16.9 G/DL (ref 14–18)
HGB UR QL STRIP: NEGATIVE
IMM GRANULOCYTES # BLD: 0.02 10*3/MM3 (ref 0–0.03)
IMM GRANULOCYTES NFR BLD: 0.2 % (ref 0–0.5)
KETONES UR QL STRIP: NEGATIVE
LDLC SERPL CALC-MCNC: 108 MG/DL (ref 0–100)
LDLC/HDLC SERPL: 2.35 {RATIO}
LEUKOCYTE ESTERASE UR QL STRIP: NEGATIVE
LYMPHOCYTES # BLD AUTO: 2.14 10*3/MM3 (ref 0.6–4.8)
LYMPHOCYTES NFR BLD AUTO: 26.5 % (ref 20–45)
MCH RBC QN AUTO: 31.1 PG (ref 27–31)
MCHC RBC AUTO-ENTMCNC: 33.7 G/DL (ref 31–37)
MCV RBC AUTO: 92.1 FL (ref 80–94)
MICRO URNS: NORMAL
MICRO URNS: NORMAL
MONOCYTES # BLD AUTO: 0.6 10*3/MM3 (ref 0–1)
MONOCYTES NFR BLD AUTO: 7.4 % (ref 3–8)
NEUTROPHILS # BLD AUTO: 5.11 10*3/MM3 (ref 1.5–8.3)
NEUTROPHILS NFR BLD AUTO: 63.5 % (ref 45–70)
NITRITE UR QL STRIP: NEGATIVE
NRBC BLD AUTO-RTO: 0 /100 WBC (ref 0–0)
PH UR STRIP: 6 [PH] (ref 5–7.5)
PLATELET # BLD AUTO: 286 10*3/MM3 (ref 140–500)
POTASSIUM SERPL-SCNC: 5.2 MMOL/L (ref 3.5–5.2)
PROT SERPL-MCNC: 7.1 G/DL (ref 6–8.5)
PROT UR QL STRIP: NEGATIVE
RBC # BLD AUTO: 5.44 10*6/MM3 (ref 4.7–6.1)
RBC #/AREA URNS HPF: NORMAL /HPF
SODIUM SERPL-SCNC: 138 MMOL/L (ref 136–145)
SP GR UR: 1.01 (ref 1–1.03)
TRIGL SERPL-MCNC: 139 MG/DL (ref 0–150)
URINALYSIS REFLEX: NORMAL
UROBILINOGEN UR STRIP-MCNC: 0.2 MG/DL (ref 0.2–1)
VLDLC SERPL CALC-MCNC: 27.8 MG/DL (ref 8–32)
WBC # BLD AUTO: 8.07 10*3/MM3 (ref 4.8–10.8)
WBC #/AREA URNS HPF: NORMAL /HPF

## 2018-05-08 ENCOUNTER — OFFICE VISIT (OUTPATIENT)
Dept: INTERNAL MEDICINE | Facility: CLINIC | Age: 58
End: 2018-05-08

## 2018-05-08 VITALS
TEMPERATURE: 98 F | HEIGHT: 72 IN | BODY MASS INDEX: 27.63 KG/M2 | OXYGEN SATURATION: 98 % | RESPIRATION RATE: 16 BRPM | WEIGHT: 204 LBS | HEART RATE: 72 BPM | DIASTOLIC BLOOD PRESSURE: 60 MMHG | SYSTOLIC BLOOD PRESSURE: 102 MMHG

## 2018-05-08 DIAGNOSIS — I25.10 CORONARY ARTERY DISEASE INVOLVING NATIVE CORONARY ARTERY OF NATIVE HEART WITHOUT ANGINA PECTORIS: ICD-10-CM

## 2018-05-08 DIAGNOSIS — I95.1 ORTHOSTATIC HYPOTENSION: ICD-10-CM

## 2018-05-08 DIAGNOSIS — Z12.5 SCREENING PSA (PROSTATE SPECIFIC ANTIGEN): ICD-10-CM

## 2018-05-08 DIAGNOSIS — E78.5 HYPERLIPIDEMIA, UNSPECIFIED HYPERLIPIDEMIA TYPE: ICD-10-CM

## 2018-05-08 DIAGNOSIS — E11.9 TYPE 2 DIABETES MELLITUS WITHOUT COMPLICATION, WITHOUT LONG-TERM CURRENT USE OF INSULIN (HCC): ICD-10-CM

## 2018-05-08 DIAGNOSIS — G47.33 OSA (OBSTRUCTIVE SLEEP APNEA): ICD-10-CM

## 2018-05-08 DIAGNOSIS — I10 ESSENTIAL HYPERTENSION: Primary | ICD-10-CM

## 2018-05-08 DIAGNOSIS — I25.5 ISCHEMIC DILATED CARDIOMYOPATHY (HCC): ICD-10-CM

## 2018-05-08 DIAGNOSIS — Z13.29 SCREENING FOR HYPOTHYROIDISM: ICD-10-CM

## 2018-05-08 DIAGNOSIS — I42.0 ISCHEMIC DILATED CARDIOMYOPATHY (HCC): ICD-10-CM

## 2018-05-08 PROCEDURE — 99214 OFFICE O/P EST MOD 30 MIN: CPT | Performed by: INTERNAL MEDICINE

## 2018-05-08 RX ORDER — CARVEDILOL 6.25 MG/1
3.12 TABLET ORAL 2 TIMES DAILY WITH MEALS
Qty: 180 TABLET | Refills: 1 | COMMUNITY
Start: 2018-05-08 | End: 2018-11-05

## 2018-05-08 NOTE — PROGRESS NOTES
"      Seth Manuel is a 57 y.o. male, who presents with a chief complaint of   Chief Complaint   Patient presents with   • Follow-up     6 month f/u, lab results    • Diabetes       56 yo M with CAD s/p stents, HLD, HTN, ED related to medications. Patient had an MI in 2014 and had a stent placed with Dr. Flores in [place]. No CP or SOB currently. He still has not established with cardiologist or sleep doctor stating \"I haven't found one good enough for me\". He needs to do more research on their credentials.      HTN is a chronic issue. He gets dizzy now when he is squatting and gets up. He has noticed that this has happened since losing 20lbs on Paleo diet and Whole 30. Eating steak and chicken mainly.      Uses Cialisi everyday. Tolerates well with no issue and helps.      Type 2 Diabetes is a chronic issue. He takes Metformin 500mg BID. He tolerates well without nausea and vomiting and no lows during the day.         The following portions of the patient's history were reviewed and updated as appropriate: allergies, current medications, past family history, past medical history, past social history, past surgical history and problem list.    Allergies: Crestor [rosuvastatin calcium] and Lipitor [atorvastatin]    Current Outpatient Prescriptions:   •  Ascorbic Acid (VITAMIN C PO), Take 3,000 Units by mouth., Disp: , Rfl:   •  aspirin 81 MG EC tablet, Take 81 mg by mouth Daily., Disp: , Rfl:   •  carvedilol (COREG) 6.25 MG tablet, Take 0.5 tablets by mouth 2 (Two) Times a Day With Meals., Disp: 180 tablet, Rfl: 1  •  Coenzyme Q10 (COQ10 PO), Take 250 mg by mouth 2 (Two) Times a Day., Disp: , Rfl:   •  famotidine (PEPCID) 40 MG tablet, Take 1 tablet by mouth Daily., Disp: 90 tablet, Rfl: 1  •  lisinopril (PRINIVIL,ZESTRIL) 5 MG tablet, Take 1 tablet by mouth Daily. (Patient taking differently: Take 2.5 mg by mouth Daily.), Disp: 90 tablet, Rfl: 1  •  metFORMIN (GLUCOPHAGE) 500 MG tablet, Take 1 tablet by mouth 2 (Two) Times " "a Day With Meals., Disp: 180 tablet, Rfl: 1  •  montelukast (SINGULAIR) 10 MG tablet, Take 1 tablet by mouth Every Night., Disp: 90 tablet, Rfl: 1  •  pitavastatin calcium (LIVALO) 2 MG tablet tablet, Take 1 tablet by mouth Every Night., Disp: 90 tablet, Rfl: 1  •  tadalafil (CIALIS) 5 MG tablet, Take 1 tablet by mouth Daily As Needed for erectile dysfunction., Disp: 90 tablet, Rfl: 2  Medications Discontinued During This Encounter   Medication Reason   • carvedilol (COREG) 6.25 MG tablet Reorder       Review of Systems   Constitutional: Negative for chills, fatigue, fever and unexpected weight change (20 lbs intentional weight loss ).   Respiratory: Negative for cough and shortness of breath.    Cardiovascular: Negative for chest pain, palpitations and leg swelling.   Gastrointestinal: Negative for abdominal pain, constipation, diarrhea and nausea.   Genitourinary: Negative for difficulty urinating and dysuria.   Skin: Negative for rash.             /60 (BP Location: Left arm, Patient Position: Sitting, Cuff Size: Adult)   Pulse 72   Temp 98 °F (36.7 °C) (Oral)   Resp 16   Ht 182.9 cm (72.01\")   Wt 92.5 kg (204 lb)   SpO2 98%   BMI 27.66 kg/m²       Physical Exam   Constitutional: He is oriented to person, place, and time. He appears well-developed and well-nourished. No distress.   HENT:   Head: Normocephalic and atraumatic.   Right Ear: Hearing, tympanic membrane and external ear normal.   Left Ear: Hearing, tympanic membrane and external ear normal.   Nose: Nose normal.   Mouth/Throat: Uvula is midline, oropharynx is clear and moist and mucous membranes are normal. No oropharyngeal exudate. Tonsils are 0 on the right. Tonsils are 0 on the left. No tonsillar exudate.   Eyes: Conjunctivae are normal. Right eye exhibits no discharge. Left eye exhibits no discharge. No scleral icterus.   Neck: Neck supple.   Cardiovascular: Normal rate, regular rhythm and normal heart sounds.  Exam reveals no gallop and " no friction rub.    No murmur heard.  Pulmonary/Chest: Effort normal and breath sounds normal. No respiratory distress. He has no wheezes. He has no rales.   Lymphadenopathy:     He has no cervical adenopathy.   Neurological: He is alert and oriented to person, place, and time.   Skin: Skin is warm. No rash noted.   Psychiatric: He has a normal mood and affect. His behavior is normal.   Nursing note and vitals reviewed.          Results for orders placed or performed in visit on 10/20/17   Comprehensive Metabolic Panel   Result Value Ref Range    Glucose 87 65 - 99 mg/dL    BUN 13 6 - 20 mg/dL    Creatinine 1.05 0.76 - 1.27 mg/dL    eGFR Non African Am 73 >60 mL/min/1.73    eGFR African Am 88 >60 mL/min/1.73    BUN/Creatinine Ratio 12.4 7.0 - 25.0    Sodium 138 136 - 145 mmol/L    Potassium 5.2 3.5 - 5.2 mmol/L    Chloride 96 (L) 98 - 107 mmol/L    Total CO2 29.8 (H) 22.0 - 29.0 mmol/L    Calcium 9.9 8.6 - 10.5 mg/dL    Total Protein 7.1 6.0 - 8.5 g/dL    Albumin 4.80 3.50 - 5.20 g/dL    Globulin 2.3 gm/dL    A/G Ratio 2.1 g/dL    Total Bilirubin 0.6 0.2 - 1.2 mg/dL    Alkaline Phosphatase 47 40 - 129 U/L    AST (SGOT) 22 5 - 40 U/L    ALT (SGPT) 14 5 - 41 U/L   Lipid Panel With LDL / HDL Ratio   Result Value Ref Range    Total Cholesterol 182 0 - 200 mg/dL    Triglycerides 139 0 - 150 mg/dL    HDL Cholesterol 46 40 - 60 mg/dL    VLDL Cholesterol 27.8 8 - 32 mg/dL    LDL Cholesterol  108 (H) 0 - 100 mg/dL    LDL/HDL Ratio 2.35    Hemoglobin A1c   Result Value Ref Range    Hemoglobin A1C 6.10 (H) 4.80 - 5.60 %   Urinalysis With / Culture If Indicated   Result Value Ref Range    Specific Gravity, UA 1.014 1.005 - 1.030    pH, UA 6.0 5.0 - 7.5    Color, UA Yellow Yellow    Appearance, UA Clear Clear    Leukocytes, UA Negative Negative    Protein Negative Negative/Trace    Glucose, UA Negative Negative    Ketones Negative Negative    Blood, UA Negative Negative    Bilirubin, UA Negative Negative    Urobilinogen, UA 0.2  0.2 - 1.0 mg/dL    Nitrite, UA Negative Negative    Microscopic Examination Comment     MICROSCOPIC EXAMINATION See below:     Urinalysis Reflex Comment    Microscopic Examination   Result Value Ref Range    WBC, UA 0-5 0 - 5 /hpf    RBC, UA None seen 0 - 2 /hpf    Epithelial Cells (non renal) 0-10 0 - 10 /hpf    Bacteria, UA None seen None seen/Few /hpf   CBC & Differential   Result Value Ref Range    WBC 8.07 4.80 - 10.80 10*3/mm3    RBC 5.44 4.70 - 6.10 10*6/mm3    Hemoglobin 16.9 14.0 - 18.0 g/dL    Hematocrit 50.1 42.0 - 52.0 %    MCV 92.1 80.0 - 94.0 fL    MCH 31.1 (H) 27.0 - 31.0 pg    MCHC 33.7 31.0 - 37.0 g/dL    RDW 13.1 11.5 - 14.5 %    Platelets 286 140 - 500 10*3/mm3    Neutrophil Rel % 63.5 45.0 - 70.0 %    Lymphocyte Rel % 26.5 20.0 - 45.0 %    Monocyte Rel % 7.4 3.0 - 8.0 %    Eosinophil Rel % 1.7 0.0 - 4.0 %    Basophil Rel % 0.7 0.0 - 2.0 %    Neutrophils Absolute 5.11 1.50 - 8.30 10*3/mm3    Lymphocytes Absolute 2.14 0.60 - 4.80 10*3/mm3    Monocytes Absolute 0.60 0.00 - 1.00 10*3/mm3    Eosinophils Absolute 0.14 0.10 - 0.30 10*3/mm3    Basophils Absolute 0.06 0.00 - 0.20 10*3/mm3    Immature Granulocyte Rel % 0.2 0.0 - 0.5 %    Immature Grans Absolute 0.02 0.00 - 0.03 10*3/mm3    nRBC 0.0 0.0 - 0.0 /100 WBC           Seth was seen today for follow-up and diabetes.    Diagnoses and all orders for this visit:    Essential hypertension  -     CBC & Differential; Future  -     Comprehensive Metabolic Panel; Future  -     Microalbumin / Creatinine Urine Ratio - Urine, Clean Catch; Future  -     Urinalysis With / Culture If Indicated - Urine, Clean Catch; Future    Type 2 diabetes mellitus without complication, without long-term current use of insulin  -     Hemoglobin A1c; Future  -     Microalbumin / Creatinine Urine Ratio - Urine, Clean Catch; Future  -     Urinalysis With / Culture If Indicated - Urine, Clean Catch; Future    Ischemic dilated cardiomyopathy  -     Lipid Panel With LDL / HDL Ratio;  Future    Hyperlipidemia, unspecified hyperlipidemia type  -     Lipid Panel With LDL / HDL Ratio; Future    Coronary artery disease involving native coronary artery of native heart without angina pectoris  -     Lipid Panel With LDL / HDL Ratio; Future    AMIRA (obstructive sleep apnea)    Orthostatic hypotension    Screening PSA (prostate specific antigen)  -     PSA DIAGNOSTIC; Future    Screening for hypothyroidism  -     TSH Rfx On Abnormal To Free T4; Future    Other orders  -     carvedilol (COREG) 6.25 MG tablet; Take 0.5 tablets by mouth 2 (Two) Times a Day With Meals.         Patient's type 2 diabetes is under good control . Will continue current regimen of Metformin.No reported side effects from medications. Will follow up in 6 months.Foot exam is up to date, but needs eye exam soon. He is on ACE and takes ASA 81mg.      HTN has been over treated since losing weight. He has some orthostatic hypotension.  Will continue current regimen of lisinopril at 2.5mg and reduce Coreg to 3.125mg PO BID and call one week. If still low, will stop completely as he is now 4 years out of his MI. Will follow up in 6 months.       Patient's cholesterol is not under good control. His LDL is higher, but he is eating steak and chicken on Paleo. Advised that he reduce saturated fats and I would ideal like his LDL to be less than 100 at least. Will continue current regimen of Livalo at current dose. No side effects from medications reported. Will follow up in 6 months to monitor levels.      He has refused to get flu shot today and pneumococcal.     He has also refused to establish care with cardiologist due to his heart doctor in [place] being so good that he doesn't think that anyone can meet his standards. I gave him numbers last time of several doctors and he is going call back in 2-3 weeks with who he would like to see. With his HLD, history of MI s/p stenting and untreated AMIRA, he is high risk.      His ED is drug induced  (the patient admits) and is stable on Cialis.        Return in about 6 months (around 11/8/2018) for Annual physical, Recheck.    Judy Good MD  05/08/2018

## 2018-05-11 RX ORDER — MONTELUKAST SODIUM 10 MG/1
10 TABLET ORAL NIGHTLY
Qty: 90 TABLET | Refills: 3 | Status: SHIPPED | OUTPATIENT
Start: 2018-05-11 | End: 2019-05-06 | Stop reason: SDUPTHER

## 2018-05-15 ENCOUNTER — TELEPHONE (OUTPATIENT)
Dept: INTERNAL MEDICINE | Facility: CLINIC | Age: 58
End: 2018-05-15

## 2018-05-15 NOTE — TELEPHONE ENCOUNTER
Patient advised as per below and voiced understanding.    ----- Message from Judy Good MD sent at 5/15/2018 11:44 AM EDT -----  Regarding: RE: BP READINGS  Contact: [phone]  Stop Coreg and call me again in one week with pressures and HR's to make sure doing okay. If higher than 130/80 before one week, then call back and we may need to start up low dose.       ----- Message -----  From: Nora Balderas MA  Sent: 5/15/2018  10:43 AM  To: Judy Good MD  Subject: FW: BP READINGS                                      ----- Message -----  From: Leonora Haley  Sent: 5/15/2018  10:36 AM  [facility]  Subject: BP READINGS                                      NAMRATA PT    Patient called to give report of BP Readings    05/09 at 8 am -- 114/74    05/10 at 4 pm -- 109/75    05/11 at 1 pm -- 105/74    05/14 at 11:30 am -- 119/76    05/15 at 8:45 am -- 116/78    Pt wants to know if he should take Coreg?    Please call patient    Thanks!  leonora

## 2018-05-25 ENCOUNTER — TELEPHONE (OUTPATIENT)
Dept: INTERNAL MEDICINE | Facility: CLINIC | Age: 58
End: 2018-05-25

## 2018-05-25 NOTE — TELEPHONE ENCOUNTER
LEFT DETAILED MESSAGE FOR PT      ----- Message from Judy Good MD sent at 5/25/2018  1:09 PM EDT -----  Regarding: RE: BP READINGS  Contact: [phone]  Great. I would continue to watch these closely. Check for another week and send me results. We may go down to 2.5mg on his lisinopril if still running on the low side.       ----- Message -----  From: Bárbara Beltran MA  Sent: 5/25/2018  12:32 PM  To: Judy Good MD  Subject: FW: BP READINGS                                      ----- Message -----  From: Leonora Haley  Sent: 5/25/2018  12:06 PM  To: Jose [address]  Subject: BP READINGS                                      NAMRATA PT    Patient is calling to give bp readings after stopping Coreg    May 16 --- 115 / 80   bpm 76    May 17 --- 102 / 72   bpm 86    May 18 --- 111 / 78   bpm 85    Patient bought new bp machine     May 22 --- 99 / 74    bpm 66    May 23 --- 104 / 75   bpm 84    He said that he is feeling great and he has more energy.    Thanks!  leonora

## 2018-05-29 RX ORDER — FAMOTIDINE 40 MG/1
40 TABLET, FILM COATED ORAL DAILY
Qty: 90 TABLET | Refills: 3 | Status: SHIPPED | OUTPATIENT
Start: 2018-05-29 | End: 2019-05-26 | Stop reason: SDUPTHER

## 2018-08-21 RX ORDER — TADALAFIL 5 MG/1
5 TABLET ORAL DAILY PRN
Qty: 90 TABLET | Refills: 2 | Status: SHIPPED | OUTPATIENT
Start: 2018-08-21 | End: 2019-04-23 | Stop reason: SDUPTHER

## 2018-09-17 RX ORDER — PITAVASTATIN CALCIUM 2.09 MG/1
TABLET, FILM COATED ORAL
Qty: 90 TABLET | Refills: 2 | Status: SHIPPED | OUTPATIENT
Start: 2018-09-17 | End: 2019-06-16 | Stop reason: SDUPTHER

## 2018-09-17 RX ORDER — CARVEDILOL 6.25 MG/1
TABLET ORAL
Qty: 180 TABLET | Refills: 2 | Status: SHIPPED | OUTPATIENT
Start: 2018-09-17 | End: 2018-11-05

## 2018-09-17 RX ORDER — LISINOPRIL 5 MG/1
TABLET ORAL
Qty: 90 TABLET | Refills: 2 | Status: SHIPPED | OUTPATIENT
Start: 2018-09-17 | End: 2018-11-05

## 2018-10-01 ENCOUNTER — RESULTS ENCOUNTER (OUTPATIENT)
Dept: INTERNAL MEDICINE | Facility: CLINIC | Age: 58
End: 2018-10-01

## 2018-10-01 DIAGNOSIS — I42.0 ISCHEMIC DILATED CARDIOMYOPATHY (HCC): ICD-10-CM

## 2018-10-01 DIAGNOSIS — I25.5 ISCHEMIC DILATED CARDIOMYOPATHY (HCC): ICD-10-CM

## 2018-10-01 DIAGNOSIS — Z13.29 SCREENING FOR HYPOTHYROIDISM: ICD-10-CM

## 2018-10-01 DIAGNOSIS — I25.10 CORONARY ARTERY DISEASE INVOLVING NATIVE CORONARY ARTERY OF NATIVE HEART WITHOUT ANGINA PECTORIS: ICD-10-CM

## 2018-10-01 DIAGNOSIS — E11.9 TYPE 2 DIABETES MELLITUS WITHOUT COMPLICATION, WITHOUT LONG-TERM CURRENT USE OF INSULIN (HCC): ICD-10-CM

## 2018-10-01 DIAGNOSIS — Z12.5 SCREENING PSA (PROSTATE SPECIFIC ANTIGEN): ICD-10-CM

## 2018-10-01 DIAGNOSIS — I10 ESSENTIAL HYPERTENSION: ICD-10-CM

## 2018-10-01 DIAGNOSIS — E78.5 HYPERLIPIDEMIA, UNSPECIFIED HYPERLIPIDEMIA TYPE: ICD-10-CM

## 2018-11-02 LAB
ALBUMIN SERPL-MCNC: 4.5 G/DL (ref 3.5–5.2)
ALBUMIN/CREAT UR: 4.1 MG/G CREAT (ref 0–30)
ALBUMIN/GLOB SERPL: 2.4 G/DL
ALP SERPL-CCNC: 52 U/L (ref 40–129)
ALT SERPL-CCNC: 19 U/L (ref 5–41)
APPEARANCE UR: CLEAR
AST SERPL-CCNC: 22 U/L (ref 5–40)
BACTERIA #/AREA URNS HPF: ABNORMAL /HPF
BACTERIA UR CULT: NORMAL
BACTERIA UR CULT: NORMAL
BASOPHILS # BLD AUTO: 0.06 10*3/MM3 (ref 0–0.2)
BASOPHILS NFR BLD AUTO: 0.9 % (ref 0–2)
BILIRUB SERPL-MCNC: 0.6 MG/DL (ref 0.2–1.2)
BILIRUB UR QL STRIP: NEGATIVE
BUN SERPL-MCNC: 11 MG/DL (ref 6–20)
BUN/CREAT SERPL: 10.4 (ref 7–25)
CALCIUM SERPL-MCNC: 9 MG/DL (ref 8.6–10.5)
CHLORIDE SERPL-SCNC: 99 MMOL/L (ref 98–107)
CHOLEST SERPL-MCNC: 150 MG/DL (ref 0–200)
CO2 SERPL-SCNC: 29.7 MMOL/L (ref 22–29)
COLOR UR: YELLOW
CREAT SERPL-MCNC: 1.06 MG/DL (ref 0.76–1.27)
CREAT UR-MCNC: 82.9 MG/DL
EOSINOPHIL # BLD AUTO: 0.25 10*3/MM3 (ref 0.1–0.3)
EOSINOPHIL NFR BLD AUTO: 3.6 % (ref 0–4)
EPI CELLS #/AREA URNS HPF: ABNORMAL /HPF
ERYTHROCYTE [DISTWIDTH] IN BLOOD BY AUTOMATED COUNT: 15.1 % (ref 11.5–14.5)
GLOBULIN SER CALC-MCNC: 1.9 GM/DL
GLUCOSE SERPL-MCNC: 86 MG/DL (ref 65–99)
GLUCOSE UR QL: NEGATIVE
HBA1C MFR BLD: 6 % (ref 4.8–5.6)
HCT VFR BLD AUTO: 51.3 % (ref 42–52)
HDLC SERPL-MCNC: 53 MG/DL (ref 40–60)
HGB BLD-MCNC: 16.7 G/DL (ref 14–18)
HGB UR QL STRIP: NEGATIVE
IMM GRANULOCYTES # BLD: 0.03 10*3/MM3 (ref 0–0.03)
IMM GRANULOCYTES NFR BLD: 0.4 % (ref 0–0.5)
KETONES UR QL STRIP: NEGATIVE
LDLC SERPL CALC-MCNC: 65 MG/DL (ref 0–100)
LDLC/HDLC SERPL: 1.23 {RATIO}
LEUKOCYTE ESTERASE UR QL STRIP: ABNORMAL
LYMPHOCYTES # BLD AUTO: 1.63 10*3/MM3 (ref 0.6–4.8)
LYMPHOCYTES NFR BLD AUTO: 23.7 % (ref 20–45)
MCH RBC QN AUTO: 30.9 PG (ref 27–31)
MCHC RBC AUTO-ENTMCNC: 32.6 G/DL (ref 31–37)
MCV RBC AUTO: 94.8 FL (ref 80–94)
MICRO URNS: ABNORMAL
MICROALBUMIN UR-MCNC: 3.4 UG/ML
MONOCYTES # BLD AUTO: 0.65 10*3/MM3 (ref 0–1)
MONOCYTES NFR BLD AUTO: 9.4 % (ref 3–8)
MUCOUS THREADS URNS QL MICRO: PRESENT /HPF
NEUTROPHILS # BLD AUTO: 4.27 10*3/MM3 (ref 1.5–8.3)
NEUTROPHILS NFR BLD AUTO: 62 % (ref 45–70)
NITRITE UR QL STRIP: NEGATIVE
NRBC BLD AUTO-RTO: 0 /100 WBC (ref 0–0)
PH UR STRIP: =>9 [PH] (ref 5–7.5)
PLATELET # BLD AUTO: 263 10*3/MM3 (ref 140–500)
POTASSIUM SERPL-SCNC: 4.4 MMOL/L (ref 3.5–5.2)
PROT SERPL-MCNC: 6.4 G/DL (ref 6–8.5)
PROT UR QL STRIP: NEGATIVE
PSA SERPL-MCNC: 0.68 NG/ML (ref 0–4)
RBC # BLD AUTO: 5.41 10*6/MM3 (ref 4.7–6.1)
RBC #/AREA URNS HPF: ABNORMAL /HPF
SODIUM SERPL-SCNC: 140 MMOL/L (ref 136–145)
SP GR UR: 1.01 (ref 1–1.03)
TRIGL SERPL-MCNC: 158 MG/DL (ref 0–150)
TSH SERPL DL<=0.005 MIU/L-ACNC: 2.88 MIU/ML (ref 0.27–4.2)
URINALYSIS REFLEX: ABNORMAL
UROBILINOGEN UR STRIP-MCNC: 0.2 MG/DL (ref 0.2–1)
VLDLC SERPL CALC-MCNC: 31.6 MG/DL (ref 8–32)
WBC # BLD AUTO: 6.89 10*3/MM3 (ref 4.8–10.8)
WBC #/AREA URNS HPF: ABNORMAL /HPF

## 2018-11-05 ENCOUNTER — OFFICE VISIT (OUTPATIENT)
Dept: INTERNAL MEDICINE | Facility: CLINIC | Age: 58
End: 2018-11-05

## 2018-11-05 VITALS
TEMPERATURE: 98.1 F | HEART RATE: 89 BPM | DIASTOLIC BLOOD PRESSURE: 76 MMHG | BODY MASS INDEX: 27.5 KG/M2 | RESPIRATION RATE: 14 BRPM | HEIGHT: 72 IN | SYSTOLIC BLOOD PRESSURE: 122 MMHG | OXYGEN SATURATION: 98 % | WEIGHT: 203 LBS

## 2018-11-05 DIAGNOSIS — E78.5 HYPERLIPIDEMIA, UNSPECIFIED HYPERLIPIDEMIA TYPE: ICD-10-CM

## 2018-11-05 DIAGNOSIS — T50.905A MEDICATION SIDE EFFECT, INITIAL ENCOUNTER: ICD-10-CM

## 2018-11-05 DIAGNOSIS — G47.33 OSA (OBSTRUCTIVE SLEEP APNEA): ICD-10-CM

## 2018-11-05 DIAGNOSIS — I25.10 CORONARY ARTERY DISEASE INVOLVING NATIVE CORONARY ARTERY OF NATIVE HEART WITHOUT ANGINA PECTORIS: ICD-10-CM

## 2018-11-05 DIAGNOSIS — E11.9 TYPE 2 DIABETES MELLITUS WITHOUT COMPLICATION, WITHOUT LONG-TERM CURRENT USE OF INSULIN (HCC): ICD-10-CM

## 2018-11-05 DIAGNOSIS — I25.2 PERSONAL HISTORY OF MI (MYOCARDIAL INFARCTION): ICD-10-CM

## 2018-11-05 DIAGNOSIS — Z00.00 ENCOUNTER FOR ANNUAL PHYSICAL EXAM: Primary | ICD-10-CM

## 2018-11-05 DIAGNOSIS — I10 ESSENTIAL HYPERTENSION: ICD-10-CM

## 2018-11-05 PROCEDURE — 99396 PREV VISIT EST AGE 40-64: CPT | Performed by: INTERNAL MEDICINE

## 2018-11-05 PROCEDURE — 93000 ELECTROCARDIOGRAM COMPLETE: CPT | Performed by: INTERNAL MEDICINE

## 2018-11-05 PROCEDURE — 99213 OFFICE O/P EST LOW 20 MIN: CPT | Performed by: INTERNAL MEDICINE

## 2018-11-05 RX ORDER — PRASTERONE (DHEA) 50 MG
1 TABLET ORAL DAILY
COMMUNITY

## 2018-11-05 RX ORDER — LISINOPRIL 2.5 MG/1
2.5 TABLET ORAL AS NEEDED
COMMUNITY
End: 2018-11-05

## 2018-11-05 NOTE — PROGRESS NOTES
"Patient Name: Seth Ann is a 58 y.o. male presenting for Annual Exam (CPE, lab results )    He has been going off of his BP medications on his own over the last 6 months. He is just now making me aware of this. If has now been off Coreg for about 6 months. He has been off of Lisinopril for about 3 months. He stopped this due to dizziness when he was working outside. Dizziness has resolved being off the BP medications. No CP currently. Walking at work about 10,000 steps plus stairs and no CP or SOB or heart flutters when he exerts.     He is taking Cialis for his erectile dysfunction and feels that it helps sometimes. He does have issues keeping an erection or having an orgasms occasionally.    He has changed his diet and did Whole 30 and then Paleo. He is doing food delivery. He has lost about 20lbs in the last year.     He has not been using his CPAP machine because \"it's too old\". He takes it off in the middle of the night with no recollection of doing it. Last sleep test was 8 years old.     Well Adult Physical   Patient here for a comprehensive physical exam.The patient reports problems - Dizziness, HLD, Type 2 diabetes and ED     Do you take any herbs or supplements that were not prescribed by a doctor? yes   Are you taking calcium supplements? no   Are you taking aspirin daily? yes     History:  Date last PSA: 10/2018 that was within normal range     Seth Manuel 58 y.o. male who presents for an Annual Wellness Visit.  he has a history of   Patient Active Problem List   Diagnosis   • Coronary artery disease involving native coronary artery   • Type 2 diabetes mellitus (CMS/HCC)   • Essential hypertension   • AMIRA (obstructive sleep apnea)   • Drug-induced erectile dysfunction   • S/P coronary artery stent placement   • HLD (hyperlipidemia)   • Osteoarthritis of both knees   • Rupture of anterior cruciate ligament of left knee   • Personal history of MI (myocardial infarction)   • Ischemic " dilated cardiomyopathy (CMS/HCC)   • Allergic rhinitis     Health Habits:  Dental Exam. not up to date - needs to schedule   Eye Exam. not up to date - will schedule this weeken   Exercise: 4 times/week.  Current exercise activities include: walking at work     Colonoscopy:[facility] in [place], [place] before he was 50 years old. We do not have records and patient states it was normal. Had early for rectal bleeding.   Tob use:N/A   Qualifies for lung Ca screening? N/A       The following portions of the patient's history were reviewed and updated as appropriate: allergies, current medications, past family history, past medical history, past social history, past surgical history and problem list.    Review of Systems   Constitutional: Negative for chills, fatigue and fever.   HENT: Negative for congestion and rhinorrhea.    Respiratory: Negative for cough and shortness of breath.    Cardiovascular: Negative for chest pain, palpitations and leg swelling.   Gastrointestinal: Negative for abdominal pain, constipation, diarrhea and vomiting.   Genitourinary: Negative for difficulty urinating and dysuria.   Musculoskeletal: Negative for arthralgias and neck pain.   Skin: Negative for rash.   Allergic/Immunologic: Negative for environmental allergies and food allergies.   Neurological: Positive for dizziness. Negative for headaches.       Crestor [rosuvastatin calcium] and Lipitor [atorvastatin]      Current Outpatient Prescriptions:   •  Ascorbic Acid (VITAMIN C PO), Take 3,000 Units by mouth., Disp: , Rfl:   •  aspirin 81 MG EC tablet, Take 81 mg by mouth Daily., Disp: , Rfl:   •  Coenzyme Q10 (COQ10 PO), Take 250 mg by mouth 2 (Two) Times a Day., Disp: , Rfl:   •  DHEA 50 MG tablet, Take 1 tablet by mouth Daily., Disp: , Rfl:   •  famotidine (PEPCID) 40 MG tablet, Take 1 tablet by mouth Daily., Disp: 90 tablet, Rfl: 3  •  LIVALO 2 MG tablet tablet, TAKE 1 TABLET EVERY NIGHT, Disp: 90 tablet, Rfl: 2  •   "metFORMIN (GLUCOPHAGE) 500 MG tablet, Take 1 tablet by mouth 2 (Two) Times a Day With Meals., Disp: 180 tablet, Rfl: 3  •  montelukast (SINGULAIR) 10 MG tablet, Take 1 tablet by mouth Every Night., Disp: 90 tablet, Rfl: 3  •  tadalafil (CIALIS) 5 MG tablet, Take 1 tablet by mouth Daily As Needed for erectile dysfunction., Disp: 90 tablet, Rfl: 2    OBJECTIVE    /76 (BP Location: Left arm, Patient Position: Sitting, Cuff Size: Adult)   Pulse 89   Temp 98.1 °F (36.7 °C) (Oral)   Resp 14   Ht 182.9 cm (72.01\")   Wt 92.1 kg (203 lb)   SpO2 98%   BMI 27.53 kg/m²     Physical Exam   Constitutional: He is oriented to person, place, and time. He appears well-developed and well-nourished. No distress.   HENT:   Head: Normocephalic and atraumatic.   Right Ear: Hearing, tympanic membrane, external ear and ear canal normal.   Left Ear: Hearing, tympanic membrane, external ear and ear canal normal.   Nose: Nose normal.   Mouth/Throat: Uvula is midline, oropharynx is clear and moist and mucous membranes are normal. No oropharyngeal exudate. Tonsils are 0 on the right. Tonsils are 0 on the left. No tonsillar exudate.   Eyes: Conjunctivae and lids are normal. Right eye exhibits no discharge. Left eye exhibits no discharge. No scleral icterus.   Neck: Trachea normal. Neck supple. Carotid bruit is not present. No thyroid mass and no thyromegaly present.   Cardiovascular: Normal rate, regular rhythm and normal heart sounds.  Exam reveals no gallop and no friction rub.    No murmur heard.  Pulmonary/Chest: Effort normal and breath sounds normal. No respiratory distress. He has no wheezes. He has no rales.   Abdominal: Soft. Bowel sounds are normal. He exhibits no distension and no mass. There is no tenderness. There is no guarding.   Lymphadenopathy:     He has no cervical adenopathy.   Neurological: He is alert and oriented to person, place, and time.   Skin: Skin is warm. No rash noted.   Psychiatric: He has a normal " mood and affect. His behavior is normal.   Nursing note and vitals reviewed.        ECG 12 Lead  Date/Time: 11/5/2018 10:37 AM  Performed by: CARLENE OTT  Authorized by: CARLENE OTT   Comparison: compared with previous ECG from 8/1/2014  Similar to previous ECG  Rhythm: sinus rhythm  Rate: normal  BPM: 70  Conduction: non-specific intraventricular conduction delay  ST Segments: ST segments normal  T Waves: T waves normal  QRS axis: normal  Other: no other findings  Q waves: II, III and aVF  Clinical impression: normal ECG  Comments: T waves are less deep on new EKG compared to one from 8/1/2014 scanned into media           ASSESSMENT AND PLAN  No vaccines today. I want Seth to begin a progressive daily aerobic exercise program, follow a low fat, low cholesterol diet, attempt to lose weight, reduce salt in diet and cooking, continue current medications and return for routine annual checkups.     Patient's type 2 diabetes is under good control . Will continue current regimen of Metformn.No reported side effects from medications. Will follow up in 6 months     He has finally agreed to see cardiology in [place]. Has not seen anyone in 2 years. EKG appears stable to me compared to one from 8/1/2014 scanned into media and no active CP or SOB. Needs to stay on statin and ASA and continue exercising and losing weight.     He has now stopped his ACE as well as his BB. I think that it is fine that is off his BB now, but he is now off ACE due to intolerance and dizziness. Feels well off and will have him discuss with Dr. Yates about potentially an ARB or just monitoring since he seems resistance to restarting. He is agreeable to see someone that trained at [facility] today and made referral. I am hopeful that he is going to follow up on this and keep appointment.     He has AMIRA, but is non-compliant with CPAP. Will send to sleep medicine and they may want to repeat sleep study and see if there are other options for him  that will help with compliance.     Likely needs c-scope soon. I need to obtain records from past c-scope and then will refer if appropriate.     Seth was seen today for annual exam.    Diagnoses and all orders for this visit:    Encounter for annual physical exam  -     ECG 12 Lead    Personal history of MI (myocardial infarction)  -     Ambulatory Referral to Cardiology    Essential hypertension    Hyperlipidemia, unspecified hyperlipidemia type    Coronary artery disease involving native coronary artery of native heart without angina pectoris  -     Ambulatory Referral to Cardiology    Medication side effect, initial encounter    Type 2 diabetes mellitus without complication, without long-term current use of insulin (CMS/formerly [facility])    AMIRA (obstructive sleep apnea)  -     Ambulatory Referral to Sleep Medicine         Return in about 6 months (around 5/5/2019) for Recheck.

## 2018-11-29 ENCOUNTER — OFFICE VISIT (OUTPATIENT)
Dept: CARDIOLOGY | Facility: CLINIC | Age: 58
End: 2018-11-29

## 2018-11-29 VITALS
HEART RATE: 83 BPM | WEIGHT: 206 LBS | BODY MASS INDEX: 27.3 KG/M2 | SYSTOLIC BLOOD PRESSURE: 140 MMHG | HEIGHT: 73 IN | DIASTOLIC BLOOD PRESSURE: 82 MMHG

## 2018-11-29 DIAGNOSIS — I42.0 ISCHEMIC DILATED CARDIOMYOPATHY (HCC): ICD-10-CM

## 2018-11-29 DIAGNOSIS — E78.5 HYPERLIPIDEMIA, UNSPECIFIED HYPERLIPIDEMIA TYPE: ICD-10-CM

## 2018-11-29 DIAGNOSIS — I10 ESSENTIAL HYPERTENSION: ICD-10-CM

## 2018-11-29 DIAGNOSIS — Z95.5 S/P CORONARY ARTERY STENT PLACEMENT: ICD-10-CM

## 2018-11-29 DIAGNOSIS — I25.10 CORONARY ARTERY DISEASE INVOLVING NATIVE CORONARY ARTERY OF NATIVE HEART WITHOUT ANGINA PECTORIS: Primary | ICD-10-CM

## 2018-11-29 DIAGNOSIS — I25.5 ISCHEMIC DILATED CARDIOMYOPATHY (HCC): ICD-10-CM

## 2018-11-29 DIAGNOSIS — E11.9 TYPE 2 DIABETES MELLITUS WITHOUT COMPLICATION, WITHOUT LONG-TERM CURRENT USE OF INSULIN (HCC): ICD-10-CM

## 2018-11-29 PROCEDURE — 93000 ELECTROCARDIOGRAM COMPLETE: CPT | Performed by: INTERNAL MEDICINE

## 2018-11-29 PROCEDURE — 99204 OFFICE O/P NEW MOD 45 MIN: CPT | Performed by: INTERNAL MEDICINE

## 2018-11-29 RX ORDER — LANOLIN ALCOHOL/MO/W.PET/CERES
20 CREAM (GRAM) TOPICAL NIGHTLY
COMMUNITY

## 2018-11-29 NOTE — PROGRESS NOTES
Seth SUSHIL Kaleb  1960  Date of Office Visit: 11/29/2018  Encounter Provider: José Miguel Yates MD  Place of Service: [facility] CARDIOLOGY      CHIEF COMPLAINT:  Ischemic cardiomyopathy  History of anterior myocardial infarction  Coronary disease with prior PCI to the LAD  Essential hypertension  Diabetes mellitus    HISTORY OF PRESENT ILLNESS:Dr. Good:    I had the pleasure of seeing your patient in consultation today. He is a very pleasant 58-year-old male with a medical history of prior anterior ST-elevation MI in 2014, coronary artery disease with PCI to an occluded LAD with a 4.0 x 12 mm Resolute drug-eluting stent. He had a cardiomyopathy at that time with a short duration of wearing a LifeVest. He has also been diagnosed with hypertension, hyperlipidemia, and diabetes mellitus. He underwent cardiac rehab and did very well with that. His ejection fraction improved to about 45%.    He denies any orthopnea, PND, or edema. He has no chest pain. He is not taking beta blocker or ACE inhibitor at this time. He stated that he just wanted to come off of medications and that he just did not feel quite right with the ACE inhibitor.        Review of Systems   Constitution: Negative for fever, weakness and malaise/fatigue.   HENT: Negative for nosebleeds and sore throat.    Eyes: Negative for blurred vision and double vision.   Cardiovascular: Negative for chest pain, claudication, palpitations and syncope.   Respiratory: Negative for cough, shortness of breath and snoring.    Endocrine: Negative for cold intolerance, heat intolerance and polydipsia.   Skin: Negative for itching, poor wound healing and rash.   Musculoskeletal: Negative for joint pain, joint swelling, muscle weakness and myalgias.   Gastrointestinal: Negative for abdominal pain, melena, nausea and vomiting.   Neurological: Negative for light-headedness, loss of balance, seizures and vertigo.   Psychiatric/Behavioral:  "Negative for altered mental status and depression.       Past Medical History:   Diagnosis Date   • Allergic rhinitis 10/27/2017   • Arthritis    • Erectile dysfunction    • Hyperlipidemia    • Hypertension    • Myocardial infarction (CMS/HCC) 07/11/2014   • Sleep apnea        The following portions of the patient's history were reviewed and updated as appropriate: Social history , Family history and Surgical history     Current Outpatient Medications on File Prior to Visit   Medication Sig Dispense Refill   • Ascorbic Acid (VITAMIN C PO) Take 3,000 Units by mouth.     • aspirin 81 MG EC tablet Take 81 mg by mouth Daily.     • Coenzyme Q10 (COQ10 PO) Take 250 mg by mouth 2 (Two) Times a Day.     • DHEA 50 MG tablet Take 1 tablet by mouth Daily.     • famotidine (PEPCID) 40 MG tablet Take 1 tablet by mouth Daily. 90 tablet 3   • LIVALO 2 MG tablet tablet TAKE 1 TABLET EVERY NIGHT 90 tablet 2   • melatonin 3 MG tablet Take 3 mg by mouth Every Night.     • metFORMIN (GLUCOPHAGE) 500 MG tablet Take 1 tablet by mouth 2 (Two) Times a Day With Meals. 180 tablet 3   • montelukast (SINGULAIR) 10 MG tablet Take 1 tablet by mouth Every Night. 90 tablet 3   • tadalafil (CIALIS) 5 MG tablet Take 1 tablet by mouth Daily As Needed for erectile dysfunction. 90 tablet 2     No current facility-administered medications on file prior to visit.        Allergies   Allergen Reactions   • Crestor [Rosuvastatin Calcium] Myalgia   • Lipitor [Atorvastatin] Myalgia       Vitals:    11/29/18 0913   BP: 140/82   Pulse: 83   Weight: 93.4 kg (206 lb)   Height: 185.4 cm (73\")     Physical Exam   Constitutional: He is oriented to person, place, and time. He appears well-developed and well-nourished.   HENT:   Head: Normocephalic and atraumatic.   Eyes: Conjunctivae and EOM are normal. No scleral icterus.   Neck: Normal range of motion. Neck supple. Normal carotid pulses, no hepatojugular reflux and no JVD present. Carotid bruit is not present. No " tracheal deviation present. No thyromegaly present.   Cardiovascular: Normal rate and regular rhythm. Exam reveals no gallop and no friction rub.   No murmur heard.  Pulses:       Carotid pulses are 2+ on the right side, and 2+ on the left side.       Radial pulses are 2+ on the right side, and 2+ on the left side.        Femoral pulses are 2+ on the right side, and 2+ on the left side.       Dorsalis pedis pulses are 2+ on the right side, and 2+ on the left side.        Posterior tibial pulses are 2+ on the right side, and 2+ on the left side.   Pulmonary/Chest: Breath sounds normal. No respiratory distress. He has no decreased breath sounds. He has no wheezes. He has no rhonchi. He has no rales. He exhibits no tenderness.   Abdominal: Soft. Bowel sounds are normal. He exhibits no distension. There is no tenderness. There is no rebound.   Musculoskeletal: He exhibits no edema or deformity.   Neurological: He is alert and oriented to person, place, and time. He has normal strength. No sensory deficit.   Skin: No rash noted. No erythema.   Psychiatric: He has a normal mood and affect. His behavior is normal.       No components found for: CBC  No results found for: CMP  No components found for: LIPID  No results found for: BMP      ECG 12 Lead  Date/Time: 11/29/2018 10:00 AM  Performed by: José Miguel Yates MD  Authorized by: José Miguel Yates MD   Comparison: compared with previous ECG from 11/4/2018  Similar to previous ECG  Rhythm: sinus rhythm  Comments: Anterolateral infarction.  Abnormal T waves in anterolateral leads.               DISCUSSION/SUMMARY  A 58-year-old with a medical history of anterior myocardial infarction, ischemic cardiomyopathy with the last ejection fraction 45%, diabetes mellitus, hypertension, and hyperlipidemia who presents to me as a transfer of care.    He has no symptoms of heart failure and no angina. He self-discontinued his lisinopril and his carvedilol therapy. He does  not want to be on medication.    His blood pressure is mildly elevated today.    1.  Coronary disease with prior PCI to the LAD.   -  Continue aspirin lifelong.   -  Continue Livalo at current dose. Patient is tolerating that well.   -  No ischemic workup indicated at this time.  2.  Essential hypertension, mildly elevated.   -  I have recommended that he start on losartan therapy. He did not tolerate ACE inhibitors in the past for more                 nondescript reasons. I have informed him that this would also help us when it comes to his renal function and                 diabetes mellitus. However, he is very resistant to starting that medicine.   -  I have given him my card and told him that if he does not come off of his metformin and drop his diagnosis of                 diabetes mellitus with exercise and weight loss, that he should be on an ARB.  3.  Ischemic cardiomyopathy, euvolemic. I do not think he would benefit much from beta blockade considering where his ejection fraction is.              Coronary Artery Disease  Assessment  • The patient has no angina    Subjective - Objective  • There is a history of past MI  • There has been a previous stent procedure using RUIZ  • Current antiplatelet therapy includes aspirin 81 mg        Heart Failure  Assessment  • NYHA class I - There is no limitation of physical activity. Physical activity does not cause fatigue, palpitations or shortness of breath.  • ACE inhibitor not prescribed for patient reasons  • Beta blocker not prescribed for patient reasons  • Diuretics not prescribed for medical reasons  • Angiotensin receptor blocker (ARB) not prescribed for patient reasons  • Calcium channel blockers not prescribed  • Left ventricular function is mildly reduced by qualitative assessment    Plan  • The heart failure care plan was discussed with the patient today including: up-titrating HF medications    Subjective/Objective    • Physical exam findings negative  for rales and elevated JVP.

## 2018-12-04 ENCOUNTER — APPOINTMENT (OUTPATIENT)
Dept: SLEEP MEDICINE | Facility: HOSPITAL | Age: 58
End: 2018-12-04
Attending: INTERNAL MEDICINE

## 2018-12-18 ENCOUNTER — OFFICE VISIT (OUTPATIENT)
Dept: SLEEP MEDICINE | Facility: HOSPITAL | Age: 58
End: 2018-12-18
Attending: INTERNAL MEDICINE

## 2018-12-18 VITALS
SYSTOLIC BLOOD PRESSURE: 119 MMHG | WEIGHT: 208 LBS | HEIGHT: 74 IN | HEART RATE: 90 BPM | BODY MASS INDEX: 26.69 KG/M2 | DIASTOLIC BLOOD PRESSURE: 79 MMHG

## 2018-12-18 DIAGNOSIS — Z87.891 EX-SMOKER: ICD-10-CM

## 2018-12-18 DIAGNOSIS — R09.81 NASAL CONGESTION: ICD-10-CM

## 2018-12-18 DIAGNOSIS — G47.33 OSA (OBSTRUCTIVE SLEEP APNEA): Primary | ICD-10-CM

## 2018-12-18 DIAGNOSIS — G47.26 CIRCADIAN RHYTHM SLEEP DISORDER, SHIFT WORK TYPE: ICD-10-CM

## 2018-12-18 PROCEDURE — G0463 HOSPITAL OUTPT CLINIC VISIT: HCPCS

## 2018-12-18 NOTE — PROGRESS NOTES
[facility] SLEEP MEDICINE  [address]  [address]  [place] [address]  [phone]    Referring Physician:   PCP: Judy Good MD    Reason for consult:  Sleep disorders of AMIRA    Seth Manuel is a 58 y.o.male was seen in the [facility] today. He was dx with AMIRA in 1990's and 2000's and was given a CPAP device. He tried 2 different machines but could not tolerate - would take mask off at night. Sleeps from 1-2 am to 8-10am. He wakes up feeling somewhat tired, worse when he was heavier. He snores some at night, fewer witnessed apneas. He denies EDS except if he eats heavy. Denies drowsiness with driving. He takes melatonin to help him sleep. He feels better since he quit smoking 4 yrs ago. He smoked 2ppd x 40 yrs. Denies SOA. Has extreme nasal congestion - uses montelukast. He only wants consideration of Inspire device.  Paisley Sleepiness Score: 6. Caffeine intake 2 per day. Alcohol intake 0 per week.    Seth Manuel  has a past medical history of Allergic rhinitis (10/27/2017), Arthritis, Erectile dysfunction, Hyperlipidemia, Hypertension, Myocardial infarction (CMS/[facility]) (07/11/2014), and Sleep apnea.     Current Medications:    Current Outpatient Medications:   •  Ascorbic Acid (VITAMIN C PO), Take 3,000 Units by mouth., Disp: , Rfl:   •  aspirin 81 MG EC tablet, Take 81 mg by mouth Daily., Disp: , Rfl:   •  Coenzyme Q10 (COQ10 PO), Take 250 mg by mouth 2 (Two) Times a Day., Disp: , Rfl:   •  DHEA 50 MG tablet, Take 1 tablet by mouth Daily., Disp: , Rfl:   •  famotidine (PEPCID) 40 MG tablet, Take 1 tablet by mouth Daily., Disp: 90 tablet, Rfl: 3  •  LIVALO 2 MG tablet tablet, TAKE 1 TABLET EVERY NIGHT, Disp: 90 tablet, Rfl: 2  •  melatonin 3 MG tablet, Take 3 mg by mouth Every Night., Disp: , Rfl:   •  metFORMIN (GLUCOPHAGE) 500 MG tablet, Take 1 tablet by mouth 2 (Two) Times a Day With Meals., Disp: 180 tablet, Rfl: 3  •  montelukast (SINGULAIR) 10 MG tablet, Take 1  "tablet by mouth Every Night., Disp: 90 tablet, Rfl: 3  •  tadalafil (CIALIS) 5 MG tablet, Take 1 tablet by mouth Daily As Needed for erectile dysfunction., Disp: 90 tablet, Rfl: 2   also listed in Sleep Questionnaire.    FH: family history includes Cancer in his father; Diabetes in his brother, mother, and paternal grandmother; Graves' disease in his mother; Heart attack (age of onset: 50) in his father and mother; Heart disease in his maternal grandfather and paternal grandfather; Hypertension in his father and mother; Lung cancer in his paternal uncle; Rheum arthritis in his maternal grandmother and mother; Stroke (age of onset: 76) in his father.  SH:  reports that he has quit smoking. His smoking use included cigarettes. he has never used smokeless tobacco. He reports that he drinks about 3.6 oz of alcohol per week. He reports that he does not use drugs.    Pertinent Positive Review of Systems of nasal congestion  Rest of Review of Systems was negative as recorded in Sleep Questionnaire.        Vital Signs: /79 (BP Location: Right arm, Patient Position: Sitting)   Pulse 90   Ht 188 cm (74\")   Wt 94.3 kg (208 lb)   BMI 26.71 kg/m²     Body mass index is 26.71 kg/m².       Tongue: large      Dentition: good       Pharynx: Posterior pharyngeal pillars are wide   Mallampatti: III (soft and hard palate and base of uvula visible)        General: Alert. Cooperative. Well developed. No acute distress.             Head:  Normocephalic. Symmetrical. Atraumatic.              Eyes: Sclera clear. No icterus. PERRLA. Normal EOM.             Ears: No deformities. Normal hearing.             Nose: No septal deviation. No drainage.          Throat: No oral lesions. No thrush. Moist mucous membranes.    Chest Wall:  Normal shape. Symmetric expansion with respiration. No tenderness.             Neck:  Trachea midline.           Lungs:  Clear to auscultation bilaterally. No wheezes. No rhonchi. No rales. Respirations " regular, even and unlabored.            Heart:  Regular rhythm and normal rate. Normal S1 and S2. No murmur.     Abdomen:  Soft, non-tender and non-distended. Normal bowel sounds. No masses.  Extremities:  Moves all extremities well. No edema.           Pulses: Pulses palpable and equal bilaterally.               Skin: Dry. Intact. No bleeding. No rash.           Neuro: Moves all 4 extremities and cranial nerves grossly intact.  Psychiatric: Normal mood and affect.    Impression:  1. AMIRA (obstructive sleep apnea)    2. Nasal congestion    3. Circadian rhythm sleep disorder, shift work type    4. Ex-smoker        Plan:  Seth has previous confirm diagnosis of obstructive sleep apnea.  However he has failed CPAP on multiple attempts due to the mask and pressures.  He would like to consider an Inspire device. I have ordered in home sleep test to see if sleep apnea is still present.  I have cautioned him that he may end up requiring his study in the lab.  I also cautioned him that he may need to again demonstrate trial of his CPAP and failure before insurance will approve Inspire device.    He has severe nasal congestion.  He only uses montelukast.  I advised him to use an anti-histamine and nasal steroids on a regular basis.  He can also try rinsing his sinuses.  If this fails to improve his sinus congestion he will need to see an ENT or an allergist.    Patient does shift work.  He goes to bed at 1 AM.  This can sometimes also further contribute to daytime sleepiness.  It is noted that this patient seems to have an adequate number of hours in bed at 7-8 hours.    Patient is an ex-smoker.  He quit smoking 4 months ago.  He denies shortness of air.  I have advised him of the need for screening CT for lung cancer.  He was advised to speak to his primary care physician about the same.    This patient has typical features of obstructive sleep apnea with hypersomnolence snoring and apneas along with elevated BMI and classic  oropharyngeal structure.  Likelihood of obstructive sleep apnea is high and a polysomnogram study will therefore be requested.      Possible diagnosis and pathophysiology of obstructive sleep apnea was discussed with the patient.  Health risks of untreated obstructive sleep apnea including cardiovascular risks were discussed.  Patient was cautioned about activities requiring full concentration especially driving at night or for longer distances, and until hypersomnolence is corrected.    Patient will follow up in this clinic after test    Thank you for allowing me to participate in your patient's care.    Noe Landaverde MD    Part of this note may be an electronic transcription/translation of spoken language to printed text using the Dragon Dictation System.

## 2019-01-25 ENCOUNTER — APPOINTMENT (OUTPATIENT)
Dept: SLEEP MEDICINE | Facility: HOSPITAL | Age: 59
End: 2019-01-25
Attending: INTERNAL MEDICINE

## 2019-01-25 ENCOUNTER — HOSPITAL ENCOUNTER (OUTPATIENT)
Dept: SLEEP MEDICINE | Facility: HOSPITAL | Age: 59
Discharge: HOME | End: 2019-01-25
Attending: INTERNAL MEDICINE

## 2019-01-25 DIAGNOSIS — G47.33 OSA (OBSTRUCTIVE SLEEP APNEA): ICD-10-CM

## 2019-01-25 PROCEDURE — 95806 SLEEP STUDY UNATT&RESP EFFT: CPT

## 2019-01-30 ENCOUNTER — TELEPHONE (OUTPATIENT)
Dept: SLEEP MEDICINE | Facility: HOSPITAL | Age: 59
End: 2019-01-30

## 2019-01-30 ENCOUNTER — TRANSCRIBE ORDERS (OUTPATIENT)
Dept: PULMONOLOGY | Facility: HOSPITAL | Age: 59
End: 2019-01-30

## 2019-01-30 DIAGNOSIS — G47.33 OSA (OBSTRUCTIVE SLEEP APNEA): Primary | ICD-10-CM

## 2019-01-30 NOTE — TELEPHONE ENCOUNTER
Spoke with patient and let him know that his HST failed and that we needed him to redo, patient is coming in 1/1/19-AK

## 2019-02-01 ENCOUNTER — HOSPITAL ENCOUNTER (OUTPATIENT)
Dept: SLEEP MEDICINE | Facility: HOSPITAL | Age: 59
Discharge: HOME | End: 2019-02-01

## 2019-02-01 DIAGNOSIS — G47.33 OSA (OBSTRUCTIVE SLEEP APNEA): ICD-10-CM

## 2019-02-05 ENCOUNTER — DOCUMENTATION (OUTPATIENT)
Dept: SLEEP MEDICINE | Facility: HOSPITAL | Age: 59
End: 2019-02-05

## 2019-02-05 NOTE — PROGRESS NOTES
S/W pt to let him know his 2nd HST did not get 6hrs of recording. Told him he would have to do an In-Lab study. 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

## 2019-03-08 ENCOUNTER — TELEPHONE (OUTPATIENT)
Dept: INTERNAL MEDICINE | Facility: CLINIC | Age: 59
End: 2019-03-08

## 2019-03-08 RX ORDER — CARVEDILOL 3.12 MG/1
3.12 TABLET ORAL 2 TIMES DAILY WITH MEALS
Qty: 60 TABLET | Refills: 0 | Status: SHIPPED | OUTPATIENT
Start: 2019-03-08 | End: 2019-04-10 | Stop reason: SDUPTHER

## 2019-03-08 NOTE — TELEPHONE ENCOUNTER
Sent to pharmacy  Pt  Aware        ----- Message from Judy Good MD sent at 3/8/2019  4:14 PM EST -----  Regarding: RE: increased HR  Contact: [phone]  3.125mg PO BID   ----- Message -----  From: Bárbara Beltran MA  Sent: 3/8/2019   3:48 PM  To: Judy Good MD  Subject: FW: increased HR                                 ngoc  [place].  What dose do you want  Him on not on med list    ----- Message -----  From: Judy Good MD  Sent: 3/8/2019   3:32 PM  To: Bárbara Beltran MA  Subject: RE: increased HR                                 Okay, I would have him start back on his Coreg and we will do EKG in the office on Monday. Go to ER over weekend if CP, SOB or other worrisome symptoms   ----- Message -----  From: Bárbara Beltran MA  Sent: 3/8/2019   2:07 PM  To: Judy Good MD  Subject: FW: increased HR                                 bp  Is running normal,    Hr  Is what is going up and down.    115  120  82  Etc.  Has appt on Monday    ----- Message -----  From: Judy Good MD  Sent: 3/8/2019  12:49 PM  To: Bárbara Beltran MA  Subject: RE: increased HR                                 He took himself off of these on his own the last time that I saw him. If he can give me the readings (HR and BP), I can help him decide what to do. I don't think that he is on anything now.     ----- Message -----  From: Bárbara Beltran MA  Sent: 3/8/2019  12:26 PM  To: Judy Good MD  Subject: FW: increased HR                                     ----- Message -----  From: Natasha Santos  Sent: 3/8/2019  12:19 PM  To: Jose Pappas[facility]  Subject: increased HR                                     [place]    Patient called for appointment with PCP regarding increase in resting HR since off of BP med.    Appointment set up for Monday 3/11 at 0800.  Contact patient if something should be started in mean time.

## 2019-03-12 ENCOUNTER — OFFICE VISIT (OUTPATIENT)
Dept: INTERNAL MEDICINE | Facility: CLINIC | Age: 59
End: 2019-03-12

## 2019-03-12 VITALS
DIASTOLIC BLOOD PRESSURE: 86 MMHG | TEMPERATURE: 98.7 F | BODY MASS INDEX: 26.82 KG/M2 | SYSTOLIC BLOOD PRESSURE: 116 MMHG | RESPIRATION RATE: 14 BRPM | WEIGHT: 209 LBS | HEIGHT: 74 IN | HEART RATE: 105 BPM | OXYGEN SATURATION: 97 %

## 2019-03-12 DIAGNOSIS — Z83.49 FAMILY HISTORY OF HYPERTHYROIDISM: ICD-10-CM

## 2019-03-12 DIAGNOSIS — R00.0 TACHYCARDIA: Primary | ICD-10-CM

## 2019-03-12 PROCEDURE — 99214 OFFICE O/P EST MOD 30 MIN: CPT | Performed by: INTERNAL MEDICINE

## 2019-03-12 PROCEDURE — 93000 ELECTROCARDIOGRAM COMPLETE: CPT | Performed by: INTERNAL MEDICINE

## 2019-03-12 NOTE — PROGRESS NOTES
Subjective   Seth Manuel is a 58 y.o. male.     History of Present Illness     57 y/o M with hx of CAD, HTN who presents due to tachycardia.  Was seen for physical for work approx 1 week ago and noted to have tachycardia to 108-120.  Denies palpitations, CP, light headedness, SOA, syncope, pre-syncope, blurry vision.  Denies hx of arrhythmias or FH of arrhythmias.  Does have hx of CAD and within last few months stopped taking BB (Coreg 6.25) due to side effects.  States side effects has improved since d/c.  Since this time, has been monitoring. He has been monitoring BP and HR at home.  Running in 120s/80s and HR in 80s to low 90s since that time.  + FH of hyperthyroidism in mother.  No recent infections.  Drinks 1.5 gallons of H20 per day.        ECG 12 Lead  Date/Time: 3/12/2019 9:29 AM  Performed by: Judy Good MD  Authorized by: Judy Good MD   Comparison: compared with previous ECG from 11/29/2018  Similar to previous ECG  Rhythm: sinus rhythm  Rate: normal  BPM: 100  Conduction: non-specific intraventricular conduction delay  ST Segments: ST segments normal  T Waves: T waves normal  T wave elevation noted on lead: abnormal T waves in anterolateral leads.  QRS axis: normal  Other: no other findings    Clinical impression: non-specific ECG            The following portions of the patient's history were reviewed and updated as appropriate: allergies, current medications, past family history, past medical history, past social history, past surgical history and problem list.    Review of Systems   Respiratory: Negative for cough and shortness of breath.    Cardiovascular: Negative for chest pain, palpitations and leg swelling.   Gastrointestinal: Negative for diarrhea, nausea and vomiting.   Skin: Negative for rash.       Objective   Physical Exam   Constitutional: He is oriented to person, place, and time. He appears well-developed and well-nourished. No distress.   HENT:   Head: Normocephalic and  atraumatic.   Right Ear: External ear normal.   Left Ear: External ear normal.   Mouth/Throat: Oropharynx is clear and moist. No oropharyngeal exudate.   Eyes: Conjunctivae are normal. Right eye exhibits no discharge. Left eye exhibits no discharge. No scleral icterus.   Neck: No thyroid mass and no thyromegaly present.   Cardiovascular: Regular rhythm and normal heart sounds. Tachycardia present. Exam reveals no gallop and no friction rub.   No murmur heard.  Pulmonary/Chest: Effort normal and breath sounds normal. No respiratory distress. He has no wheezes. He has no rales.   Neurological: He is alert and oriented to person, place, and time.   Skin: Skin is warm. No rash noted.   Psychiatric: He has a normal mood and affect. His behavior is normal.   Nursing note and vitals reviewed.      Assessment/Plan   Seth was seen today for heart problem.    Diagnoses and all orders for this visit:    Tachycardia  -     Basic Metabolic Panel  -     TSH Rfx On Abnormal To Free T4  -     ECG 12 Lead    Family history of hyperthyroidism  -     TSH Rfx On Abnormal To Free T4      Sinus tachycardia   - Asymptomatic with incidental finding of sinus tachy.  EKG performed in office today and without changes other than sinus tachycardia.  No atrial fibrillation or A flutter noted.  This may be a result of recent cessation of BB and rebound tachycardia.  Patient just began lower dose of Coreg 3.125 mg BID yesterday.  Will continue this for now.  He is to monitor HR at home and call office on Friday if it remains > 100 at point will consider further workup.  TSH and BMP in office today given tachycardia and hx of hypothyroidism.      CAD  - Unable ot tolerate ACE due to hypotension  - Resuming BB  - On ASA and Livalo    DM  - Continue metformin for now.  If A1c remains improved, will consider d/c at next appointment.    Sal Bill MD  Med/Peds PGY-4          I saw patient with resident and I agree with assessment and plan. Will  call me in one week with HR's and pressures. Decrease stress if he can. Will go up on Coreg if still tachycardia in one week and check holter. EKG stable

## 2019-03-15 LAB
BUN SERPL-MCNC: 8 MG/DL (ref 6–20)
BUN/CREAT SERPL: 9.4 (ref 7–25)
CALCIUM SERPL-MCNC: 9.6 MG/DL (ref 8.6–10.5)
CHLORIDE SERPL-SCNC: 98 MMOL/L (ref 98–107)
CO2 SERPL-SCNC: 23.4 MMOL/L (ref 22–29)
CREAT SERPL-MCNC: 0.85 MG/DL (ref 0.76–1.27)
GLUCOSE SERPL-MCNC: 87 MG/DL (ref 65–99)
POTASSIUM SERPL-SCNC: 4.4 MMOL/L (ref 3.5–5.2)
SODIUM SERPL-SCNC: 140 MMOL/L (ref 136–145)
T4 FREE SERPL-MCNC: NORMAL NG/DL
TSH SERPL DL<=0.005 MIU/L-ACNC: 1.36 MIU/ML (ref 0.27–4.2)

## 2019-03-18 NOTE — PROGRESS NOTES
Thyroid and kidney function all normal. Please ask about his BP's and HR's and see how he is doing on Coreg.

## 2019-03-20 ENCOUNTER — TELEPHONE (OUTPATIENT)
Dept: INTERNAL MEDICINE | Facility: CLINIC | Age: 59
End: 2019-03-20

## 2019-03-20 NOTE — TELEPHONE ENCOUNTER
Patient advised via [facility].     ----- Message from Judy Good MD sent at 3/18/2019  8:33 AM EDT -----  Thyroid and kidney function all normal. Please ask about his BP's and HR's and see how he is doing on Coreg.

## 2019-04-10 RX ORDER — CARVEDILOL 3.12 MG/1
TABLET ORAL
Qty: 180 TABLET | Refills: 1 | Status: SHIPPED | OUTPATIENT
Start: 2019-04-10 | End: 2019-10-17 | Stop reason: SDUPTHER

## 2019-04-23 RX ORDER — TADALAFIL 5 MG/1
TABLET ORAL
Qty: 90 TABLET | Refills: 2 | Status: SHIPPED | OUTPATIENT
Start: 2019-04-23 | End: 2019-12-30 | Stop reason: SDUPTHER

## 2019-04-26 DIAGNOSIS — E78.2 MIXED HYPERLIPIDEMIA: ICD-10-CM

## 2019-04-26 DIAGNOSIS — I10 ESSENTIAL HYPERTENSION: ICD-10-CM

## 2019-04-26 DIAGNOSIS — E11.65 TYPE 2 DIABETES MELLITUS WITH HYPERGLYCEMIA, WITHOUT LONG-TERM CURRENT USE OF INSULIN (HCC): Primary | ICD-10-CM

## 2019-04-30 ENCOUNTER — RESULTS ENCOUNTER (OUTPATIENT)
Dept: INTERNAL MEDICINE | Facility: CLINIC | Age: 59
End: 2019-04-30

## 2019-04-30 DIAGNOSIS — I10 ESSENTIAL HYPERTENSION: ICD-10-CM

## 2019-04-30 DIAGNOSIS — E11.65 TYPE 2 DIABETES MELLITUS WITH HYPERGLYCEMIA, WITHOUT LONG-TERM CURRENT USE OF INSULIN (HCC): ICD-10-CM

## 2019-04-30 DIAGNOSIS — E78.2 MIXED HYPERLIPIDEMIA: ICD-10-CM

## 2019-05-06 RX ORDER — MONTELUKAST SODIUM 10 MG/1
TABLET ORAL
Qty: 90 TABLET | Refills: 3 | Status: SHIPPED | OUTPATIENT
Start: 2019-05-06 | End: 2020-04-30

## 2019-05-14 LAB
ALBUMIN SERPL-MCNC: 4.4 G/DL (ref 3.5–5.2)
ALBUMIN/CREAT UR: 14.1 MG/G CREAT (ref 0–30)
ALBUMIN/GLOB SERPL: 2.4 G/DL
ALP SERPL-CCNC: 67 U/L (ref 39–117)
ALT SERPL-CCNC: 23 U/L (ref 1–41)
AST SERPL-CCNC: 22 U/L (ref 1–40)
BASOPHILS # BLD AUTO: 0.07 10*3/MM3 (ref 0–0.2)
BASOPHILS NFR BLD AUTO: 0.9 % (ref 0–1.5)
BILIRUB SERPL-MCNC: 0.3 MG/DL (ref 0.2–1.2)
BUN SERPL-MCNC: 7 MG/DL (ref 6–20)
BUN/CREAT SERPL: 8 (ref 7–25)
CALCIUM SERPL-MCNC: 9.6 MG/DL (ref 8.6–10.5)
CHLORIDE SERPL-SCNC: 101 MMOL/L (ref 98–107)
CHOLEST SERPL-MCNC: 148 MG/DL (ref 0–200)
CO2 SERPL-SCNC: 28.8 MMOL/L (ref 22–29)
CREAT SERPL-MCNC: 0.88 MG/DL (ref 0.76–1.27)
CREAT UR-MCNC: 58.2 MG/DL
EOSINOPHIL # BLD AUTO: 0.21 10*3/MM3 (ref 0–0.4)
EOSINOPHIL NFR BLD AUTO: 2.6 % (ref 0.3–6.2)
ERYTHROCYTE [DISTWIDTH] IN BLOOD BY AUTOMATED COUNT: 13.9 % (ref 12.3–15.4)
GLOBULIN SER CALC-MCNC: 1.8 GM/DL
GLUCOSE SERPL-MCNC: 112 MG/DL (ref 65–99)
HBA1C MFR BLD: 5.8 % (ref 4.8–5.6)
HCT VFR BLD AUTO: 50.2 % (ref 37.5–51)
HDLC SERPL-MCNC: 51 MG/DL (ref 40–60)
HGB BLD-MCNC: 16.2 G/DL (ref 13–17.7)
IMM GRANULOCYTES # BLD AUTO: 0.06 10*3/MM3 (ref 0–0.05)
IMM GRANULOCYTES NFR BLD AUTO: 0.7 % (ref 0–0.5)
LDLC SERPL CALC-MCNC: 66 MG/DL (ref 0–100)
LDLC/HDLC SERPL: 1.3 {RATIO}
LYMPHOCYTES # BLD AUTO: 1.63 10*3/MM3 (ref 0.7–3.1)
LYMPHOCYTES NFR BLD AUTO: 20 % (ref 19.6–45.3)
MCH RBC QN AUTO: 31.3 PG (ref 26.6–33)
MCHC RBC AUTO-ENTMCNC: 32.3 G/DL (ref 31.5–35.7)
MCV RBC AUTO: 96.9 FL (ref 79–97)
MICROALBUMIN UR-MCNC: 8.2 UG/ML
MONOCYTES # BLD AUTO: 0.68 10*3/MM3 (ref 0.1–0.9)
MONOCYTES NFR BLD AUTO: 8.4 % (ref 5–12)
NEUTROPHILS # BLD AUTO: 5.48 10*3/MM3 (ref 1.7–7)
NEUTROPHILS NFR BLD AUTO: 67.4 % (ref 42.7–76)
NRBC BLD AUTO-RTO: 0 /100 WBC (ref 0–0.2)
PLATELET # BLD AUTO: 295 10*3/MM3 (ref 140–450)
POTASSIUM SERPL-SCNC: 4.8 MMOL/L (ref 3.5–5.2)
PROT SERPL-MCNC: 6.2 G/DL (ref 6–8.5)
RBC # BLD AUTO: 5.18 10*6/MM3 (ref 4.14–5.8)
SODIUM SERPL-SCNC: 142 MMOL/L (ref 136–145)
TRIGL SERPL-MCNC: 153 MG/DL (ref 0–150)
VLDLC SERPL CALC-MCNC: 30.6 MG/DL
WBC # BLD AUTO: 8.13 10*3/MM3 (ref 3.4–10.8)

## 2019-05-20 ENCOUNTER — OFFICE VISIT (OUTPATIENT)
Dept: INTERNAL MEDICINE | Facility: CLINIC | Age: 59
End: 2019-05-20

## 2019-05-20 VITALS
OXYGEN SATURATION: 98 % | TEMPERATURE: 98.7 F | HEIGHT: 74 IN | BODY MASS INDEX: 26.31 KG/M2 | DIASTOLIC BLOOD PRESSURE: 72 MMHG | RESPIRATION RATE: 14 BRPM | HEART RATE: 95 BPM | WEIGHT: 205 LBS | SYSTOLIC BLOOD PRESSURE: 120 MMHG

## 2019-05-20 DIAGNOSIS — Z12.11 COLON CANCER SCREENING: ICD-10-CM

## 2019-05-20 DIAGNOSIS — E11.9 TYPE 2 DIABETES MELLITUS WITHOUT COMPLICATION, WITHOUT LONG-TERM CURRENT USE OF INSULIN (HCC): ICD-10-CM

## 2019-05-20 DIAGNOSIS — I10 ESSENTIAL HYPERTENSION: Primary | ICD-10-CM

## 2019-05-20 DIAGNOSIS — N52.2 DRUG-INDUCED ERECTILE DYSFUNCTION: ICD-10-CM

## 2019-05-20 DIAGNOSIS — E78.5 HYPERLIPIDEMIA, UNSPECIFIED HYPERLIPIDEMIA TYPE: ICD-10-CM

## 2019-05-20 PROCEDURE — 99214 OFFICE O/P EST MOD 30 MIN: CPT | Performed by: INTERNAL MEDICINE

## 2019-05-20 NOTE — PROGRESS NOTES
Seth Manuel is a 58 y.o. male, who presents with a chief complaint of   Chief Complaint   Patient presents with   • Follow-up     3 x month f/u, lab results   • Hypertension       59 yo M with h/o MI and Type 2 diabetes here for follow up. He is doing well with medication with no issues other than ED.     He has chronic HTN and is doing well on Coreg with no issues. Heart rate is not high anymore at home. No CP or SOB. No palpitations.     He has chronic HLD and is doing well on Livalo. TG's are still a little high and he is eating better on most days. Trying to keep his weight done.     He has Type 2 diabetes that is stable. Taking Metformin 500mg PO BID. Would like to take once per day.          The following portions of the patient's history were reviewed and updated as appropriate: allergies, current medications, past family history, past medical history, past social history, past surgical history and problem list.    Allergies: Crestor [rosuvastatin calcium] and Lipitor [atorvastatin]    Current Outpatient Medications:   •  Ascorbic Acid (VITAMIN C PO), Take 3,000 Units by mouth., Disp: , Rfl:   •  aspirin 81 MG EC tablet, Take 81 mg by mouth Daily., Disp: , Rfl:   •  carvedilol (COREG) 3.125 MG tablet, TAKE ONE TABLET BY MOUTH TWICE A DAY WITH MEALS, Disp: 180 tablet, Rfl: 1  •  Coenzyme Q10 (COQ10 PO), Take 250 mg by mouth 2 (Two) Times a Day., Disp: , Rfl:   •  DHEA 50 MG tablet, Take 1 tablet by mouth Daily., Disp: , Rfl:   •  famotidine (PEPCID) 40 MG tablet, Take 1 tablet by mouth Daily., Disp: 90 tablet, Rfl: 3  •  LIVALO 2 MG tablet tablet, TAKE 1 TABLET EVERY NIGHT, Disp: 90 tablet, Rfl: 2  •  melatonin 3 MG tablet, Take 3 mg by mouth Every Night., Disp: , Rfl:   •  montelukast (SINGULAIR) 10 MG tablet, TAKE 1 TABLET EVERY NIGHT, Disp: 90 tablet, Rfl: 3  •  tadalafil (CIALIS) 5 MG tablet, TAKE ONE PO DAILY , Disp: 90 tablet, Rfl: 2  •  metFORMIN (GLUCOPHAGE) 500 MG tablet, Take 1 tablet by mouth  "2 (Two) Times a Day With Meals. (Patient taking differently: Take 500 mg by mouth Daily With Breakfast.), Disp: 180 tablet, Rfl: 3  There are no discontinued medications.    Review of Systems   Constitutional: Negative for chills and fatigue.   HENT: Negative for congestion and rhinorrhea.    Respiratory: Negative for cough and shortness of breath.    Cardiovascular: Negative for chest pain and palpitations.   Gastrointestinal: Negative for abdominal pain.   Genitourinary: Negative for difficulty urinating.   Neurological: Negative for dizziness and headaches.             /72 (BP Location: Left arm, Patient Position: Sitting, Cuff Size: Large Adult)   Pulse 95   Temp 98.7 °F (37.1 °C) (Oral)   Resp 14   Ht 188 cm (74\")   Wt 93 kg (205 lb)   SpO2 98%   BMI 26.32 kg/m²       Physical Exam   Constitutional: He is oriented to person, place, and time. He appears well-developed and well-nourished. No distress.   HENT:   Head: Normocephalic and atraumatic.   Right Ear: External ear normal.   Left Ear: External ear normal.   Mouth/Throat: Oropharynx is clear and moist. No oropharyngeal exudate.   Eyes: Conjunctivae are normal. Right eye exhibits no discharge. Left eye exhibits no discharge. No scleral icterus.   Neck: Neck supple.   Cardiovascular: Normal rate, regular rhythm and normal heart sounds. Exam reveals no gallop and no friction rub.   No murmur heard.  Pulmonary/Chest: Effort normal and breath sounds normal. No respiratory distress. He has no wheezes. He has no rales.   Lymphadenopathy:     He has no cervical adenopathy.   Neurological: He is alert and oriented to person, place, and time.   Skin: Skin is warm. No rash noted.   Psychiatric: He has a normal mood and affect. His behavior is normal.   Nursing note and vitals reviewed.          Results for orders placed or performed in visit on 04/30/19   Comprehensive metabolic panel   Result Value Ref Range    Glucose 112 (H) 65 - 99 mg/dL    BUN 7 6 - " 20 mg/dL    Creatinine 0.88 0.76 - 1.27 mg/dL    eGFR Non African Am 89 >60 mL/min/1.73    eGFR African Am 108 >60 mL/min/1.73    BUN/Creatinine Ratio 8.0 7.0 - 25.0    Sodium 142 136 - 145 mmol/L    Potassium 4.8 3.5 - 5.2 mmol/L    Chloride 101 98 - 107 mmol/L    Total CO2 28.8 22.0 - 29.0 mmol/L    Calcium 9.6 8.6 - 10.5 mg/dL    Total Protein 6.2 6.0 - 8.5 g/dL    Albumin 4.40 3.50 - 5.20 g/dL    Globulin 1.8 gm/dL    A/G Ratio 2.4 g/dL    Total Bilirubin 0.3 0.2 - 1.2 mg/dL    Alkaline Phosphatase 67 39 - 117 U/L    AST (SGOT) 22 1 - 40 U/L    ALT (SGPT) 23 1 - 41 U/L   Lipid Panel With LDL / HDL Ratio   Result Value Ref Range    Total Cholesterol 148 0 - 200 mg/dL    Triglycerides 153 (H) 0 - 150 mg/dL    HDL Cholesterol 51 40 - 60 mg/dL    VLDL Cholesterol 30.6 mg/dL    LDL Cholesterol  66 0 - 100 mg/dL    LDL/HDL Ratio 1.30    Hemoglobin A1c   Result Value Ref Range    Hemoglobin A1C 5.80 (H) 4.80 - 5.60 %   Microalbumin / Creatinine Urine Ratio - Urine, Clean Catch   Result Value Ref Range    Creatinine, Urine 58.2 Not Estab. mg/dL    Microalbumin, Urine 8.2 Not Estab. ug/mL    Microalbumin/Creatinine Ratio 14.1 0.0 - 30.0 mg/g creat   CBC & Differential   Result Value Ref Range    WBC 8.13 3.40 - 10.80 10*3/mm3    RBC 5.18 4.14 - 5.80 10*6/mm3    Hemoglobin 16.2 13.0 - 17.7 g/dL    Hematocrit 50.2 37.5 - 51.0 %    MCV 96.9 79.0 - 97.0 fL    MCH 31.3 26.6 - 33.0 pg    MCHC 32.3 31.5 - 35.7 g/dL    RDW 13.9 12.3 - 15.4 %    Platelets 295 140 - 450 10*3/mm3    Neutrophil Rel % 67.4 42.7 - 76.0 %    Lymphocyte Rel % 20.0 19.6 - 45.3 %    Monocyte Rel % 8.4 5.0 - 12.0 %    Eosinophil Rel % 2.6 0.3 - 6.2 %    Basophil Rel % 0.9 0.0 - 1.5 %    Neutrophils Absolute 5.48 1.70 - 7.00 10*3/mm3    Lymphocytes Absolute 1.63 0.70 - 3.10 10*3/mm3    Monocytes Absolute 0.68 0.10 - 0.90 10*3/mm3    Eosinophils Absolute 0.21 0.00 - 0.40 10*3/mm3    Basophils Absolute 0.07 0.00 - 0.20 10*3/mm3    Immature Granulocyte Rel %  0.7 (H) 0.0 - 0.5 %    Immature Grans Absolute 0.06 (H) 0.00 - 0.05 10*3/mm3    nRBC 0.0 0.0 - 0.2 /100 WBC           Seth was seen today for follow-up and hypertension.    Diagnoses and all orders for this visit:    Essential hypertension    Hyperlipidemia, unspecified hyperlipidemia type    Type 2 diabetes mellitus without complication, without long-term current use of insulin (CMS/[facility])    Drug-induced erectile dysfunction    Colon cancer screening  -     Ambulatory Referral For Screening Colonoscopy      HTN is under good control and patient will continue to monitor with home BP cuff. No side effects from medications. Will continue current regimen of Coreg. Will follow up in 6 months      Patient's cholesterol is under good control. Will continue current regimen of Livalo. No side effects from medications reported. Will follow up in 6 months to monitor levels.     Patient's type 2 diabetes is under good control . Will continue current regimen of Metformin and will reduce to 500mg.No reported side effects from medications. Will follow up in 6 months     Cialsis for ED as needed. Referral for c-scope sent.     Return in about 6 months (around 11/20/2019) for Recheck.    Judy Good MD  05/20/2019

## 2019-05-21 DIAGNOSIS — G47.10 HYPERSOMNIA: Primary | ICD-10-CM

## 2019-05-22 ENCOUNTER — DOCUMENTATION (OUTPATIENT)
Dept: SLEEP MEDICINE | Facility: HOSPITAL | Age: 59
End: 2019-05-22

## 2019-05-22 NOTE — PROGRESS NOTES
Per Uyen Juarez, BREANNE   I called in 1 Ambien 5mg tab, Disp 1 Tab. No refill. Pt to bring tablet to take at  [facility]ights [facility]. Do not take at home. Spoke caitlin Peña at the [facility] at [place].

## 2019-05-28 ENCOUNTER — TELEPHONE (OUTPATIENT)
Dept: INTERNAL MEDICINE | Facility: CLINIC | Age: 59
End: 2019-05-28

## 2019-05-28 RX ORDER — FAMOTIDINE 40 MG/1
TABLET, FILM COATED ORAL
Qty: 90 TABLET | Refills: 3 | Status: SHIPPED | OUTPATIENT
Start: 2019-05-28 | End: 2020-05-22

## 2019-05-28 NOTE — TELEPHONE ENCOUNTER
CORRECTED AND RESENT          ----- Message from Judy Good MD sent at 5/28/2019  9:03 AM EDT -----  Metformin needs to changed to daily. I accidentally signed for BID. I am not sure that he needs Pepcid either.

## 2019-05-30 ENCOUNTER — HOSPITAL ENCOUNTER (OUTPATIENT)
Dept: SLEEP MEDICINE | Facility: HOSPITAL | Age: 59
Discharge: HOME | End: 2019-05-30
Admitting: NURSE PRACTITIONER

## 2019-05-30 DIAGNOSIS — G47.10 HYPERSOMNIA: ICD-10-CM

## 2019-05-30 PROCEDURE — 95811 POLYSOM 6/>YRS CPAP 4/> PARM: CPT

## 2019-06-13 ENCOUNTER — OFFICE VISIT (OUTPATIENT)
Dept: SLEEP MEDICINE | Facility: HOSPITAL | Age: 59
End: 2019-06-13

## 2019-06-13 VITALS
HEIGHT: 73 IN | WEIGHT: 212 LBS | SYSTOLIC BLOOD PRESSURE: 128 MMHG | DIASTOLIC BLOOD PRESSURE: 85 MMHG | BODY MASS INDEX: 28.1 KG/M2 | HEART RATE: 100 BPM

## 2019-06-13 DIAGNOSIS — G47.33 OSA (OBSTRUCTIVE SLEEP APNEA): Primary | ICD-10-CM

## 2019-06-13 PROCEDURE — G0463 HOSPITAL OUTPT CLINIC VISIT: HCPCS

## 2019-06-13 PROCEDURE — 99213 OFFICE O/P EST LOW 20 MIN: CPT | Performed by: FAMILY MEDICINE

## 2019-06-13 NOTE — PROGRESS NOTES
Follow Up [facility] Disorders [facility] Note     Chief Complaint:  AMIRA     Primary Care Physician: Judy Good MD    Seth Manuel is a 58 y.o.male was diagnosed with obstructive sleep apnea in the 90s/2000 however did not tolerate 2 different machines.  Presented in December 2018 to see Dr. Landaverde to get back into care.  Patient had an lab study done May 30, 2019 was found to have mild obstructive sleep apnea with severe snoring.  Per Dr. Landaverde's report BiPAP pressure 13/9 was recommended however because patient has previously been intolerant of fixed pressures auto bilevel was recommended with Minimum EPAP of 8 and pressure support of 4.  Patient presents today for a face-to-face appointment to discuss his results and recommended pressure settings.  No new issues since last visit.  Patient reports that after the split study he felt the settings were very appropriate because he felt very well rested the next day.  He is very eager to start use with his machine.      Current Medications:    Current Outpatient Medications:   •  Ascorbic Acid (VITAMIN C PO), Take 3,000 Units by mouth., Disp: , Rfl:   •  aspirin 81 MG EC tablet, Take 81 mg by mouth Daily., Disp: , Rfl:   •  carvedilol (COREG) 3.125 MG tablet, TAKE ONE TABLET BY MOUTH TWICE A DAY WITH MEALS, Disp: 180 tablet, Rfl: 1  •  Coenzyme Q10 (COQ10 PO), Take 250 mg by mouth 2 (Two) Times a Day., Disp: , Rfl:   •  DHEA 50 MG tablet, Take 1 tablet by mouth Daily., Disp: , Rfl:   •  famotidine (PEPCID) 40 MG tablet, TAKE 1 TABLET DAILY, Disp: 90 tablet, Rfl: 3  •  LIVALO 2 MG tablet tablet, TAKE 1 TABLET EVERY NIGHT, Disp: 90 tablet, Rfl: 2  •  melatonin 3 MG tablet, Take 3 mg by mouth Every Night., Disp: , Rfl:   •  metFORMIN (GLUCOPHAGE) 500 MG tablet, TAKE ONE PO DAILY, Disp: 90 tablet, Rfl: 3  •  montelukast (SINGULAIR) 10 MG tablet, TAKE 1 TABLET EVERY NIGHT, Disp: 90 tablet, Rfl: 3  •  tadalafil (CIALIS) 5 MG tablet, TAKE ONE PO DAILY , Disp: 90 tablet, Rfl: 2    "also entered in Sleep Questionnaire    Patient  has a past medical history of Allergic rhinitis (10/27/2017), Arthritis, Erectile dysfunction, Hyperlipidemia, Hypertension, Myocardial infarction (CMS/[facility]) (07/11/2014), and Sleep apnea.    Social History:    Social History     Socioeconomic History   • Marital status:      Spouse name: Not on file   • Number of children: Not on file   • Years of education: Not on file   • Highest education level: Not on file   Tobacco Use   • Smoking status: Former Smoker     Types: Cigarettes   • Smokeless tobacco: Never Used   • Tobacco comment: quit at age 52   Substance and Sexual Activity   • Alcohol use: Yes     Alcohol/week: 3.6 oz     Types: 6 Cans of beer per week   • Drug use: No   • Sexual activity: Defer       Allergies:  Crestor [rosuvastatin calcium] and Lipitor [atorvastatin]    Review of Systems negative except for: Nasal congestion intermittently secondary to allergies; see scanned sleep questionnaire for further details.    Vital Signs:    Vitals:    06/13/19 1100   BP: 128/85   Pulse: 100   Weight: 96.2 kg (212 lb)   Height: 185.4 cm (73\")     Body mass index is 27.97 kg/m².    Vital Signs /85   Pulse 100   Ht 185.4 cm (73\")   Wt 96.2 kg (212 lb)   BMI 27.97 kg/m²  Body mass index is 27.97 kg/m².    General Alert and oriented. No acute distress noted   Pharynx/Throat Class III Mallampati airway, large tongue, no evidence of redundant lateral pharyngeal tissue. No oral lesions. No thrush. Moist mucous membranes.   Head Normocephalic. Symmetrical. Atraumatic.    Nose No septal deviation. No drainage   Chest Wall Normal shape. Symmetric expansion with respiration. No tenderness.   Neck Trachea midline, no thyromegaly or adenopathy    Lungs Clear to auscultation bilaterally. No wheezes. No rhonchi. No rales. Respirations regular, even and unlabored.   Heart Regular rhythm and normal rate. Normal S1 and S2. No murmur   Abdomen Soft, non-tender and " non-distended. Normal bowel sounds. No masses.   Extremities Moves all extremities well. No edema   Psychiatric Normal mood and affect.     Impression:  1. AMIRA (obstructive sleep apnea)        Auto bilevel will be ordered with minimum EPAP of 8 and pressure support of 4.  Patient to return to clinic for appointment with Dr. Landaverde after at least 31 days of use of BiPAP for follow-up or sooner if needed.    Rosetta Shah MD  Sleep Medicine  06/13/19  12:17 PM

## 2019-06-17 RX ORDER — PITAVASTATIN CALCIUM 2.09 MG/1
TABLET, FILM COATED ORAL
Qty: 90 TABLET | Refills: 2 | Status: SHIPPED | OUTPATIENT
Start: 2019-06-17 | End: 2020-03-13

## 2019-06-20 ENCOUNTER — APPOINTMENT (OUTPATIENT)
Dept: SLEEP MEDICINE | Facility: HOSPITAL | Age: 59
End: 2019-06-20

## 2019-07-25 ENCOUNTER — OFFICE VISIT (OUTPATIENT)
Dept: SLEEP MEDICINE | Facility: HOSPITAL | Age: 59
End: 2019-07-25

## 2019-07-25 VITALS
SYSTOLIC BLOOD PRESSURE: 131 MMHG | DIASTOLIC BLOOD PRESSURE: 86 MMHG | WEIGHT: 214 LBS | HEART RATE: 84 BPM | BODY MASS INDEX: 28.36 KG/M2 | HEIGHT: 73 IN

## 2019-07-25 DIAGNOSIS — G47.00 INSOMNIA, UNSPECIFIED TYPE: ICD-10-CM

## 2019-07-25 DIAGNOSIS — G47.33 OSA (OBSTRUCTIVE SLEEP APNEA): Primary | ICD-10-CM

## 2019-07-25 PROCEDURE — G0463 HOSPITAL OUTPT CLINIC VISIT: HCPCS

## 2019-07-25 PROCEDURE — 99213 OFFICE O/P EST LOW 20 MIN: CPT | Performed by: FAMILY MEDICINE

## 2019-07-25 RX ORDER — TRAZODONE HYDROCHLORIDE 50 MG/1
TABLET ORAL
Qty: 45 TABLET | Refills: 1 | Status: SHIPPED | OUTPATIENT
Start: 2019-07-25 | End: 2019-10-18 | Stop reason: SDUPTHER

## 2019-07-25 NOTE — PROGRESS NOTES
Follow Up [facility] Note     Chief Complaint:  AMIRA     Primary Care Physician: Judy Good MD    Seth Manuel is a 58 y.o.male  was last seen at [facility] sleep lab: June 13, 2019.  At his last visit with me presented for a face-to-face appointment to discuss his results and recommended pressure settings.  He is already had his split study done via Dr. Landaverde.  At last visit auto bilevel was ordered with minimum EPAP of 8 and pressure support of 4.  He presents today for follow-up.  She reports that he has a full facemask which fits well however does leak air and also reports dry mouth.  ESS of 10 today.  Patient reports issues with mask leaking air.  He also notes his symptoms below the mask without realizing at night.  He has had difficulty falling asleep initially as well.  Notes night of the sleep study was the best night of sleep he had in several years; first is attributing it to finding appropriate fitting mask however now he is thinking it might be due to the sleep number bed or the Ambien he took that night.  Notes it is hard to turn his mind off before he goes to bed; notes some component of anxiety/stress/dysphoria.    Results Review:  DME is ever care.  Downloads between June 24, 2019 to July 23, 2019.  Average usage is 3 hours 5 minutes.  Compliance for at least 4 hours usage per night is 23%.  Average AHI is 12.1.  Average AutoBIPAP pressure is 15.4/7.4 cm H2O.    Current Medications:    Current Outpatient Medications:   •  Ascorbic Acid (VITAMIN C PO), Take 3,000 Units by mouth., Disp: , Rfl:   •  aspirin 81 MG EC tablet, Take 81 mg by mouth Daily., Disp: , Rfl:   •  carvedilol (COREG) 3.125 MG tablet, TAKE ONE TABLET BY MOUTH TWICE A DAY WITH MEALS, Disp: 180 tablet, Rfl: 1  •  Coenzyme Q10 (COQ10 PO), Take 250 mg by mouth 2 (Two) Times a Day., Disp: , Rfl:   •  DHEA 50 MG tablet, Take 1 tablet by mouth Daily., Disp: , Rfl:   •  famotidine (PEPCID) 40 MG tablet, TAKE 1 TABLET DAILY, Disp:  "90 tablet, Rfl: 3  •  LIVALO 2 MG tablet tablet, TAKE 1 TABLET EVERY NIGHT, Disp: 90 tablet, Rfl: 2  •  melatonin 3 MG tablet, Take 3 mg by mouth Every Night., Disp: , Rfl:   •  metFORMIN (GLUCOPHAGE) 500 MG tablet, TAKE ONE PO DAILY, Disp: 90 tablet, Rfl: 3  •  montelukast (SINGULAIR) 10 MG tablet, TAKE 1 TABLET EVERY NIGHT, Disp: 90 tablet, Rfl: 3  •  tadalafil (CIALIS) 5 MG tablet, TAKE ONE PO DAILY , Disp: 90 tablet, Rfl: 2  •  traZODone (DESYREL) 50 MG tablet, Take half a tab PO QHS; can increase in 25 mg increments to max of 100 mg QHS, Disp: 45 tablet, Rfl: 1   also entered in Sleep Questionnaire    Patient  has a past medical history of Allergic rhinitis (10/27/2017), Arthritis, Erectile dysfunction, Hyperlipidemia, Hypertension, Myocardial infarction (CMS/[facility]) (07/11/2014), and Sleep apnea.    Social History:    Social History     Socioeconomic History   • Marital status:      Spouse name: Not on file   • Number of children: Not on file   • Years of education: Not on file   • Highest education level: Not on file   Tobacco Use   • Smoking status: Former Smoker     Types: Cigarettes   • Smokeless tobacco: Never Used   • Tobacco comment: quit at age 52   Substance and Sexual Activity   • Alcohol use: Yes     Alcohol/week: 3.6 oz     Types: 6 Cans of beer per week   • Drug use: No   • Sexual activity: Defer       Allergies:  Crestor [rosuvastatin calcium] and Lipitor [atorvastatin]    Review of Systems:    A complete review of systems was done and all were negative with the exception of nasal congestion    Vital Signs:    Vitals:    07/25/19 0900   BP: 131/86   Pulse: 84   Weight: 97.1 kg (214 lb)   Height: 185.4 cm (73\")     Body mass index is 28.23 kg/m².    Vital Signs /86   Pulse 84   Ht 185.4 cm (73\")   Wt 97.1 kg (214 lb)   BMI 28.23 kg/m²  Body mass index is 28.23 kg/m².    General Alert and oriented. No acute distress noted   Pharynx/Throat Class IV Mallampati airway, large tongue, no " evidence of redundant lateral pharyngeal tissue. No oral lesions. No thrush. Moist mucous membranes.   Head Normocephalic. Symmetrical. Atraumatic.    Nose No septal deviation. No drainage   Chest Wall Normal shape. Symmetric expansion with respiration. No tenderness.   Neck Trachea midline, no thyromegaly or adenopathy    Lungs Clear to auscultation bilaterally. No wheezes. No rhonchi. No rales. Respirations regular, even and unlabored.   Heart Regular rhythm and normal rate. Normal S1 and S2. No murmur   Abdomen Soft, non-tender and non-distended. Normal bowel sounds. No masses.   Extremities Moves all extremities well. No edema   Psychiatric Normal mood and affect.     Impression:  1. AMIRA (obstructive sleep apnea)    2. Insomnia, unspecified type        Obstructive sleep apnea not adequately treated with auto BiPAP with insufficient compliance and usage.    Weight loss will be strongly beneficial to reduce the severity of sleep-disordered breathing.  Caution during activities that require prolonged concentration is strongly advised if sleepiness returns. Changing of PAP supplies regularly is important for effective use. Patient needs to change cushion on the mask or plugs on nasal pillows along with disposable filters once every month and change mask frame, tubing, headgear and Velcro straps every 6 months at the minimum.    Instead of doing a sleep aid like Ambien, will try trazodone.  Will prescribe 50 mg nightly however advised to start with half a tablet at first.  Advised that he can go up to 25 mg increments to help with sleep if needed with a max dosage of 100 mg nightly.  I would like him to only go to max of 100 mg for now as he will have follow-up with me in about a month.    We will do mask fitting today to ensure headgear/strap/mask fitting well to help reduce the week which is also disrupting his sleep.    Also discussed acclimatization technique to help him get used to the mask and the machine  better and prevent him from pulling it off without realizing it in his sleep.  Patient will put mask/machine on an hour before bedtime only sitting in the living room on the couch watching TV.  We will do this for 2 to 3 weeks to help condition his brain to the mask/machine.    If all these measures do not improve compliance and sleep latency, will consider referral to CBT-I.  Patient expressed understanding and agreeable to plan.    Return to clinic in 1 month for follow-up.    Rosetta Shah MD  Sleep Medicine  07/25/19  10:51 AM

## 2019-10-17 DIAGNOSIS — G47.33 OSA (OBSTRUCTIVE SLEEP APNEA): ICD-10-CM

## 2019-10-17 RX ORDER — CARVEDILOL 3.12 MG/1
TABLET ORAL
Qty: 60 TABLET | Refills: 0 | Status: SHIPPED | OUTPATIENT
Start: 2019-10-17 | End: 2019-11-17 | Stop reason: SDUPTHER

## 2019-10-17 RX ORDER — TRAZODONE HYDROCHLORIDE 50 MG/1
TABLET ORAL
Qty: 45 TABLET | Refills: 0 | OUTPATIENT
Start: 2019-10-17

## 2019-10-18 DIAGNOSIS — G47.33 OSA (OBSTRUCTIVE SLEEP APNEA): ICD-10-CM

## 2019-10-21 RX ORDER — TRAZODONE HYDROCHLORIDE 50 MG/1
TABLET ORAL
Qty: 45 TABLET | Refills: 0 | Status: SHIPPED | OUTPATIENT
Start: 2019-10-21 | End: 2019-12-03 | Stop reason: SDUPTHER

## 2019-11-11 ENCOUNTER — OFFICE VISIT (OUTPATIENT)
Dept: CARDIOLOGY | Facility: CLINIC | Age: 59
End: 2019-11-11

## 2019-11-11 VITALS
WEIGHT: 209.6 LBS | BODY MASS INDEX: 27.78 KG/M2 | HEIGHT: 73 IN | SYSTOLIC BLOOD PRESSURE: 130 MMHG | HEART RATE: 87 BPM | DIASTOLIC BLOOD PRESSURE: 78 MMHG

## 2019-11-11 DIAGNOSIS — I25.10 CORONARY ARTERY DISEASE INVOLVING NATIVE CORONARY ARTERY OF NATIVE HEART WITHOUT ANGINA PECTORIS: ICD-10-CM

## 2019-11-11 DIAGNOSIS — E11.9 TYPE 2 DIABETES MELLITUS WITHOUT COMPLICATION, WITHOUT LONG-TERM CURRENT USE OF INSULIN (HCC): ICD-10-CM

## 2019-11-11 DIAGNOSIS — I10 ESSENTIAL HYPERTENSION: ICD-10-CM

## 2019-11-11 DIAGNOSIS — E78.5 HYPERLIPIDEMIA, UNSPECIFIED HYPERLIPIDEMIA TYPE: ICD-10-CM

## 2019-11-11 DIAGNOSIS — Z95.5 S/P CORONARY ARTERY STENT PLACEMENT: ICD-10-CM

## 2019-11-11 DIAGNOSIS — I25.5 ISCHEMIC DILATED CARDIOMYOPATHY (HCC): Primary | ICD-10-CM

## 2019-11-11 DIAGNOSIS — I42.0 ISCHEMIC DILATED CARDIOMYOPATHY (HCC): Primary | ICD-10-CM

## 2019-11-11 PROCEDURE — 93000 ELECTROCARDIOGRAM COMPLETE: CPT | Performed by: INTERNAL MEDICINE

## 2019-11-11 PROCEDURE — 99214 OFFICE O/P EST MOD 30 MIN: CPT | Performed by: INTERNAL MEDICINE

## 2019-11-11 NOTE — PROGRESS NOTES
Seth Manuel  1960  Date of Office Visit: 11/11/19  Encounter Provider: José Miguel Yates MD  Place of Service: [facility] CARDIOLOGY      CHIEF COMPLAINT:  Ischemic cardiomyopathy  History of anterior myocardial infarction  Coronary disease with prior PCI to the LAD  Essential hypertension  Diabetes mellitus    HISTORY OF PRESENT ILLNESS:Dr. Good:  I had the pleasure of seeing your patient in consultation today. He is a very pleasant 59-year-old male with a medical history of prior anterior ST-elevation MI in 2014, coronary artery disease with PCI to an occluded LAD with a 4.0 x 12 mm Resolute drug-eluting stent. He had a cardiomyopathy at that time with a short duration of wearing a LifeVest. He has also been diagnosed with hypertension, hyperlipidemia, and diabetes mellitus. He underwent cardiac rehab and did very well with that. His ejection fraction improved to about 45%.        Review of Systems   Constitution: Negative for fever, weakness and malaise/fatigue.   HENT: Negative for nosebleeds and sore throat.    Eyes: Negative for blurred vision and double vision.   Cardiovascular: Negative for chest pain, claudication, palpitations and syncope.   Respiratory: Negative for cough, shortness of breath and snoring.    Endocrine: Negative for cold intolerance, heat intolerance and polydipsia.   Skin: Negative for itching, poor wound healing and rash.   Musculoskeletal: Negative for joint pain, joint swelling, muscle weakness and myalgias.   Gastrointestinal: Negative for abdominal pain, melena, nausea and vomiting.   Neurological: Negative for light-headedness, loss of balance, seizures and vertigo.   Psychiatric/Behavioral: Negative for altered mental status and depression.       Past Medical History:   Diagnosis Date   • Allergic rhinitis 10/27/2017   • Arthritis    • Erectile dysfunction    • Hyperlipidemia    • Hypertension    • Myocardial infarction (CMS/[facility])  "07/11/2014   • Sleep apnea        The following portions of the patient's history were reviewed and updated as appropriate: Social history , Family history and Surgical history     Current Outpatient Medications on File Prior to Visit   Medication Sig Dispense Refill   • Ascorbic Acid (VITAMIN C PO) Take 3,000 Units by mouth.     • aspirin 81 MG EC tablet Take 81 mg by mouth Daily.     • carvedilol (COREG) 3.125 MG tablet TAKE ONE TABLET BY MOUTH TWICE A DAY WITH MEALS 60 tablet 0   • Coenzyme Q10 (COQ10 PO) Take 250 mg by mouth 2 (Two) Times a Day.     • DHEA 50 MG tablet Take 1 tablet by mouth Daily.     • famotidine (PEPCID) 40 MG tablet TAKE 1 TABLET DAILY 90 tablet 3   • LIVALO 2 MG tablet tablet TAKE 1 TABLET EVERY NIGHT 90 tablet 2   • melatonin 3 MG tablet Take 3 mg by mouth Every Night.     • metFORMIN (GLUCOPHAGE) 500 MG tablet TAKE ONE PO DAILY 90 tablet 3   • montelukast (SINGULAIR) 10 MG tablet TAKE 1 TABLET EVERY NIGHT 90 tablet 3   • tadalafil (CIALIS) 5 MG tablet TAKE ONE PO DAILY  90 tablet 2   • traZODone (DESYREL) 50 MG tablet TAKE ONE-HALF TABLET BY MOUTH EVERY NIGHT AT BEDTIME CAN INCREASE IN 25MG INCREMENTS TO MAX OF 100MG EVERY NIGHT AT BEDTIME 45 tablet 0     No current facility-administered medications on file prior to visit.        Allergies   Allergen Reactions   • Crestor [Rosuvastatin Calcium] Myalgia   • Lipitor [Atorvastatin] Myalgia       Vitals:    11/11/19 0950   BP: 130/78   BP Location: Right arm   Patient Position: Sitting   Pulse: 87   Weight: 95.1 kg (209 lb 9.6 oz)   Height: 185.4 cm (73\")     Physical Exam   Constitutional: He is oriented to person, place, and time. He appears well-developed and well-nourished.   HENT:   Head: Normocephalic and atraumatic.   Eyes: Conjunctivae and EOM are normal. No scleral icterus.   Neck: Normal range of motion. Neck supple. Normal carotid pulses, no hepatojugular reflux and no JVD present. Carotid bruit is not present. No tracheal " deviation present. No thyromegaly present.   Cardiovascular: Normal rate and regular rhythm. Exam reveals no gallop and no friction rub.   No murmur heard.  Pulses:       Carotid pulses are 2+ on the right side, and 2+ on the left side.       Radial pulses are 2+ on the right side, and 2+ on the left side.        Femoral pulses are 2+ on the right side, and 2+ on the left side.       Dorsalis pedis pulses are 2+ on the right side, and 2+ on the left side.        Posterior tibial pulses are 2+ on the right side, and 2+ on the left side.   Pulmonary/Chest: Breath sounds normal. No respiratory distress. He has no decreased breath sounds. He has no wheezes. He has no rhonchi. He has no rales. He exhibits no tenderness.   Abdominal: Soft. Bowel sounds are normal. He exhibits no distension. There is no tenderness. There is no rebound.   Musculoskeletal: He exhibits no edema or deformity.   Neurological: He is alert and oriented to person, place, and time. He has normal strength. No sensory deficit.   Skin: No rash noted. No erythema.   Psychiatric: He has a normal mood and affect. His behavior is normal.       No results found for: CBC  No results found for: CMP  No components found for: LIPID  No results found for: BMP      ECG 12 Lead  Date/Time: 11/11/2019 10:13 AM  Performed by: José Miguel Yates MD  Authorized by: José Miguel Yates MD   Comparison: compared with previous ECG from 11/29/2018  Similar to previous ECG  Rhythm: sinus rhythm  Rate: normal  QRS axis: normal    Clinical impression: abnormal EKG  Comments: Anterolateral infarct.  T wave abnormalities anterolateral leads.               DISCUSSION/SUMMARY  A 58-year-old with a medical history of anterior myocardial infarction, ischemic cardiomyopathy with the last ejection fraction 45%, diabetes mellitus, hypertension, and hyperlipidemia who presents to me in follow-up.  He is overall doing very well.  He denies any chest pain or dyspnea on exertion.   No orthopnea or PND.  His hyperlipidemia and diabetes mellitus are well controlled.  He does report recent issues with dietary noncompliance and he is going to start working on that.      1.  Coronary disease with prior PCI to the LAD.   -  Continue aspirin lifelong.   -  Continue Livalo at current dose. Patient is tolerating that well.   -  No ischemic workup indicated at this time.  2.  Essential hypertension, mildly elevated.    3.  Ischemic cardiomyopathy, euvolemic.    -Continue carvedilol.  I will not add an ARB at this time.  We could consider that if his blood pressure goes up from where it is currently.  His ejection fraction is greater than 40% and I do not think there is  much of a benefit from a cardiomyopathy standpoint.  I will defer addition of this therapy for diabetes mellitus and renal protection up to the primary team.    4.  Diabetes mellitus: Per primary team.  On metformin.

## 2019-11-18 RX ORDER — CARVEDILOL 3.12 MG/1
TABLET ORAL
Qty: 60 TABLET | Refills: 0 | Status: SHIPPED | OUTPATIENT
Start: 2019-11-18 | End: 2019-12-19

## 2019-12-03 DIAGNOSIS — G47.33 OSA (OBSTRUCTIVE SLEEP APNEA): ICD-10-CM

## 2019-12-03 RX ORDER — TRAZODONE HYDROCHLORIDE 50 MG/1
TABLET ORAL
Qty: 45 TABLET | Refills: 0 | Status: SHIPPED | OUTPATIENT
Start: 2019-12-03 | End: 2020-02-11 | Stop reason: SDUPTHER

## 2019-12-19 RX ORDER — CARVEDILOL 3.12 MG/1
TABLET ORAL
Qty: 60 TABLET | Refills: 0 | Status: SHIPPED | OUTPATIENT
Start: 2019-12-19 | End: 2020-01-20

## 2019-12-30 ENCOUNTER — TELEPHONE (OUTPATIENT)
Dept: INTERNAL MEDICINE | Facility: CLINIC | Age: 59
End: 2019-12-30

## 2019-12-30 RX ORDER — TADALAFIL 5 MG/1
5 TABLET ORAL DAILY
Qty: 90 TABLET | Refills: 0 | Status: SHIPPED | OUTPATIENT
Start: 2019-12-30 | End: 2020-01-14 | Stop reason: SDUPTHER

## 2019-12-30 RX ORDER — TADALAFIL 5 MG/1
5 TABLET ORAL DAILY
Qty: 90 TABLET | Refills: 0 | Status: SHIPPED | OUTPATIENT
Start: 2019-12-30 | End: 2019-12-30 | Stop reason: SDUPTHER

## 2019-12-30 NOTE — TELEPHONE ENCOUNTER
Patient has made appointment to see practitioner, FIORDALIZA Ross  Requesting script for tadalafil (CIALIS) 5 MG tablet be sent to [facility].

## 2020-01-14 ENCOUNTER — OFFICE VISIT (OUTPATIENT)
Dept: INTERNAL MEDICINE | Facility: CLINIC | Age: 60
End: 2020-01-14

## 2020-01-14 VITALS
RESPIRATION RATE: 16 BRPM | WEIGHT: 215.8 LBS | OXYGEN SATURATION: 96 % | BODY MASS INDEX: 28.6 KG/M2 | HEIGHT: 73 IN | SYSTOLIC BLOOD PRESSURE: 102 MMHG | HEART RATE: 97 BPM | TEMPERATURE: 98.6 F | DIASTOLIC BLOOD PRESSURE: 70 MMHG

## 2020-01-14 DIAGNOSIS — E11.9 TYPE 2 DIABETES MELLITUS WITHOUT COMPLICATION, WITHOUT LONG-TERM CURRENT USE OF INSULIN (HCC): ICD-10-CM

## 2020-01-14 DIAGNOSIS — Z12.5 SCREENING FOR MALIGNANT NEOPLASM OF PROSTATE: ICD-10-CM

## 2020-01-14 DIAGNOSIS — Z00.00 ROUTINE HEALTH MAINTENANCE: ICD-10-CM

## 2020-01-14 DIAGNOSIS — I10 ESSENTIAL HYPERTENSION: Primary | ICD-10-CM

## 2020-01-14 DIAGNOSIS — E78.2 MIXED HYPERLIPIDEMIA: ICD-10-CM

## 2020-01-14 DIAGNOSIS — N52.2 DRUG-INDUCED ERECTILE DYSFUNCTION: ICD-10-CM

## 2020-01-14 PROCEDURE — 99214 OFFICE O/P EST MOD 30 MIN: CPT | Performed by: NURSE PRACTITIONER

## 2020-01-14 RX ORDER — TADALAFIL 5 MG/1
5 TABLET ORAL DAILY
Qty: 90 TABLET | Refills: 0 | Status: SHIPPED | OUTPATIENT
Start: 2020-01-14 | End: 2020-09-21

## 2020-01-14 NOTE — PROGRESS NOTES
Subjective    Seth Manuel is a 59 y.o. male presenting today for   Chief Complaint   Patient presents with   • Establish Care   • Hypertension   • Hyperlipidemia       History of Present Illness     Seth Manuel presents today as a new patient to me to establish care.   Prior PCP was Judy Good, and his current/chronic health conditions include:    Patient Active Problem List   Diagnosis   • Coronary artery disease involving native coronary artery   • Type 2 diabetes mellitus (CMS/HCC)   • Essential hypertension   • AMIRA (obstructive sleep apnea)   • Drug-induced erectile dysfunction   • S/P coronary artery stent placement   • HLD (hyperlipidemia)   • Osteoarthritis of both knees   • Rupture of anterior cruciate ligament of left knee   • Personal history of MI (myocardial infarction)   • Ischemic dilated cardiomyopathy (CMS/HCC)   • Allergic rhinitis       Patient Care Team:  Joselyn Ross APRN as PCP - General (Family Medicine)  José Miguel Yates MD as Consulting Physician (Cardiology)  Rosetta Shah MD as Emergency Attending (Family Medicine)      Current Outpatient Medications:   •  Ascorbic Acid (VITAMIN C PO), Take 3,000 Units by mouth., Disp: , Rfl:   •  aspirin 81 MG EC tablet, Take 81 mg by mouth Daily., Disp: , Rfl:   •  carvedilol (COREG) 3.125 MG tablet, TAKE ONE TABLET BY MOUTH TWICE A DAY WITH MEALS, Disp: 60 tablet, Rfl: 0  •  Coenzyme Q10 (COQ10 PO), Take 250 mg by mouth 2 (Two) Times a Day., Disp: , Rfl:   •  DHEA 50 MG tablet, Take 1 tablet by mouth Daily., Disp: , Rfl:   •  famotidine (PEPCID) 40 MG tablet, TAKE 1 TABLET DAILY, Disp: 90 tablet, Rfl: 3  •  LIVALO 2 MG tablet tablet, TAKE 1 TABLET EVERY NIGHT, Disp: 90 tablet, Rfl: 2  •  melatonin 3 MG tablet, Take 3 mg by mouth Every Night., Disp: , Rfl:   •  metFORMIN (GLUCOPHAGE) 500 MG tablet, Take 1 tablet by mouth 2 (Two) Times a Day. TAKE ONE PO DAILY, Disp: 180 tablet, Rfl: 3  •  montelukast (SINGULAIR) 10 MG tablet, TAKE 1  "TABLET EVERY NIGHT, Disp: 90 tablet, Rfl: 3  •  tadalafil (CIALIS) 5 MG tablet, Take 1 tablet by mouth Daily., Disp: 90 tablet, Rfl: 0  •  traZODone (DESYREL) 50 MG tablet, TAKE ONE-HALF TABLET BY MOUTH EVERY NIGHT AT BEDTIME CAN INCREASE IN 25MG INCREMENTS TO MAX OF 100MG EVERY NIGHT AT BEDTIME, Disp: 45 tablet, Rfl: 0      Ms. Manuel reports he is doing well. He is tolerating is current medication regimen w/o SEs. He generally follows a paleo diet and regularly exercises but his daughter is getting  this weekend and he has been out of his normal routine.       New complaints today include: none      The following portions of the patient's history were reviewed and updated as appropriate: allergies, current medications, past family history, past medical history, past social history, past surgical history and problem list.    Review of Systems   Constitutional: Negative.    HENT: Negative.    Eyes: Negative.    Respiratory: Negative.    Cardiovascular: Negative.    Gastrointestinal: Negative.    Genitourinary: Positive for erectile dysfunction.   Musculoskeletal: Positive for arthralgias.   Skin: Negative.    Neurological: Negative.    Psychiatric/Behavioral: Negative.        Objective    Vitals:    01/14/20 1520   BP: 102/70   BP Location: Left arm   Patient Position: Sitting   Cuff Size: Adult   Pulse: 97   Resp: 16   Temp: 98.6 °F (37 °C)   TempSrc: Oral   SpO2: 96%   Weight: 97.9 kg (215 lb 12.8 oz)   Height: 185.4 cm (73\")     Body mass index is 28.47 kg/m².  Nursing notes and vitals reviewed.    Physical Exam   Constitutional: He is oriented to person, place, and time. He appears well-developed and well-nourished. No distress.   HENT:   Right Ear: Tympanic membrane, external ear and ear canal normal.   Left Ear: Tympanic membrane, external ear and ear canal normal.   Nose: Nose normal.   Mouth/Throat: Uvula is midline, oropharynx is clear and moist and mucous membranes are normal.   Eyes: Conjunctivae " are normal.   Neck: Neck supple. No thyroid mass and no thyromegaly present.   Cardiovascular: Regular rhythm, S1 normal, S2 normal and normal pulses. Exam reveals no gallop and no friction rub.   No murmur heard.  Pulmonary/Chest: Effort normal and breath sounds normal. He has no wheezes. He has no rhonchi. He has no rales.   Lymphadenopathy:     He has no cervical adenopathy.   Neurological: He is alert and oriented to person, place, and time.   Psychiatric: He has a normal mood and affect. His speech is normal and behavior is normal. Thought content normal. He is attentive.       Assessment and Plan    Seth was seen today for establish care, hypertension and hyperlipidemia.    Diagnoses and all orders for this visit:    Essential hypertension  -     CBC & Differential; Future  -     Comprehensive Metabolic Panel; Future  -     Thyroid Cascade Profile; Future    Mixed hyperlipidemia  -     CBC & Differential; Future  -     Comprehensive Metabolic Panel; Future  -     Lipid Panel With / Chol / HDL Ratio; Future    Type 2 diabetes mellitus without complication, without long-term current use of insulin (CMS/[facility])  -     metFORMIN (GLUCOPHAGE) 500 MG tablet; Take 1 tablet by mouth 2 (Two) Times a Day. TAKE ONE PO DAILY  -     CBC & Differential; Future  -     Comprehensive Metabolic Panel; Future  -     Hemoglobin A1c; Future  -     Thyroid Cascade Profile; Future  -     Microalbumin / Creatinine Urine Ratio - Urine, Clean Catch; Future    Drug-induced erectile dysfunction  -     tadalafil (CIALIS) 5 MG tablet; Take 1 tablet by mouth Daily.    Screening for malignant neoplasm of prostate  -     PSA Screen; Future    Routine health maintenance  -     Vitamin D 25 Hydroxy; Future        Medications Discontinued During This Encounter   Medication Reason   • tadalafil (CIALIS) 5 MG tablet Reorder   • metFORMIN (GLUCOPHAGE) 500 MG tablet      Except as noted above, pt will continue current medications as noted in the  medication list.  Continue to follow with specialty care as directed by them.    Return in about 4 weeks (around 2/11/2020) for Annual physical.

## 2020-01-19 ENCOUNTER — RESULTS ENCOUNTER (OUTPATIENT)
Dept: INTERNAL MEDICINE | Facility: CLINIC | Age: 60
End: 2020-01-19

## 2020-01-19 DIAGNOSIS — Z12.5 SCREENING FOR MALIGNANT NEOPLASM OF PROSTATE: ICD-10-CM

## 2020-01-19 DIAGNOSIS — I10 ESSENTIAL HYPERTENSION: ICD-10-CM

## 2020-01-19 DIAGNOSIS — Z00.00 ROUTINE HEALTH MAINTENANCE: ICD-10-CM

## 2020-01-19 DIAGNOSIS — E11.9 TYPE 2 DIABETES MELLITUS WITHOUT COMPLICATION, WITHOUT LONG-TERM CURRENT USE OF INSULIN (HCC): ICD-10-CM

## 2020-01-19 DIAGNOSIS — E78.2 MIXED HYPERLIPIDEMIA: ICD-10-CM

## 2020-01-20 RX ORDER — CARVEDILOL 3.12 MG/1
TABLET ORAL
Qty: 60 TABLET | Refills: 0 | Status: SHIPPED | OUTPATIENT
Start: 2020-01-20 | End: 2020-02-21

## 2020-01-23 ENCOUNTER — TELEPHONE (OUTPATIENT)
Dept: INTERNAL MEDICINE | Facility: CLINIC | Age: 60
End: 2020-01-23

## 2020-01-23 DIAGNOSIS — E11.9 TYPE 2 DIABETES MELLITUS WITHOUT COMPLICATION, WITHOUT LONG-TERM CURRENT USE OF INSULIN (HCC): ICD-10-CM

## 2020-01-23 NOTE — TELEPHONE ENCOUNTER
----- Message from Paula Kramer CMA sent at 1/22/2020  4:00 PM EST -----  Pharm sent over clarification for Metformin. There are 2 different sigs on Metformin, would you like him to take 1 po daily or 1 po BID. Please advise, thank you

## 2020-02-07 LAB
25(OH)D3+25(OH)D2 SERPL-MCNC: 56.3 NG/ML (ref 30–100)
ALBUMIN SERPL-MCNC: 4.8 G/DL (ref 3.5–5.2)
ALBUMIN/CREAT UR: 19 MG/G CREAT (ref 0–29)
ALBUMIN/GLOB SERPL: 2.4 G/DL
ALP SERPL-CCNC: 53 U/L (ref 39–117)
ALT SERPL-CCNC: 20 U/L (ref 1–41)
AST SERPL-CCNC: 26 U/L (ref 1–40)
BASOPHILS # BLD AUTO: 0.06 10*3/MM3 (ref 0–0.2)
BASOPHILS NFR BLD AUTO: 0.8 % (ref 0–1.5)
BILIRUB SERPL-MCNC: 0.5 MG/DL (ref 0.2–1.2)
BUN SERPL-MCNC: 10 MG/DL (ref 6–20)
BUN/CREAT SERPL: 10.3 (ref 7–25)
CALCIUM SERPL-MCNC: 9.1 MG/DL (ref 8.6–10.5)
CHLORIDE SERPL-SCNC: 98 MMOL/L (ref 98–107)
CHOLEST SERPL-MCNC: 170 MG/DL (ref 0–200)
CHOLEST/HDLC SERPL: 3.15 {RATIO}
CO2 SERPL-SCNC: 28.1 MMOL/L (ref 22–29)
CREAT SERPL-MCNC: 0.97 MG/DL (ref 0.76–1.27)
CREAT UR-MCNC: 50.6 MG/DL
EOSINOPHIL # BLD AUTO: 0.16 10*3/MM3 (ref 0–0.4)
EOSINOPHIL NFR BLD AUTO: 2.1 % (ref 0.3–6.2)
ERYTHROCYTE [DISTWIDTH] IN BLOOD BY AUTOMATED COUNT: 13.5 % (ref 12.3–15.4)
GLOBULIN SER CALC-MCNC: 2 GM/DL
GLUCOSE SERPL-MCNC: 96 MG/DL (ref 65–99)
HBA1C MFR BLD: 5.7 % (ref 4.8–5.6)
HCT VFR BLD AUTO: 47 % (ref 37.5–51)
HDLC SERPL-MCNC: 54 MG/DL (ref 40–60)
HGB BLD-MCNC: 16 G/DL (ref 13–17.7)
IMM GRANULOCYTES # BLD AUTO: 0.05 10*3/MM3 (ref 0–0.05)
IMM GRANULOCYTES NFR BLD AUTO: 0.7 % (ref 0–0.5)
LDLC SERPL CALC-MCNC: 40 MG/DL (ref 0–100)
LYMPHOCYTES # BLD AUTO: 1.45 10*3/MM3 (ref 0.7–3.1)
LYMPHOCYTES NFR BLD AUTO: 19.4 % (ref 19.6–45.3)
MCH RBC QN AUTO: 31.6 PG (ref 26.6–33)
MCHC RBC AUTO-ENTMCNC: 34 G/DL (ref 31.5–35.7)
MCV RBC AUTO: 92.9 FL (ref 79–97)
MICROALBUMIN UR-MCNC: 9.5 UG/ML
MONOCYTES # BLD AUTO: 0.6 10*3/MM3 (ref 0.1–0.9)
MONOCYTES NFR BLD AUTO: 8 % (ref 5–12)
NEUTROPHILS # BLD AUTO: 5.16 10*3/MM3 (ref 1.7–7)
NEUTROPHILS NFR BLD AUTO: 69 % (ref 42.7–76)
NRBC BLD AUTO-RTO: 0 /100 WBC (ref 0–0.2)
PLATELET # BLD AUTO: 286 10*3/MM3 (ref 140–450)
POTASSIUM SERPL-SCNC: 4.6 MMOL/L (ref 3.5–5.2)
PROT SERPL-MCNC: 6.8 G/DL (ref 6–8.5)
PSA SERPL-MCNC: 1.01 NG/ML (ref 0–4)
RBC # BLD AUTO: 5.06 10*6/MM3 (ref 4.14–5.8)
SODIUM SERPL-SCNC: 139 MMOL/L (ref 136–145)
TRIGL SERPL-MCNC: 378 MG/DL (ref 0–150)
TSH SERPL DL<=0.005 MIU/L-ACNC: 1.28 UIU/ML (ref 0.45–4.5)
VLDLC SERPL CALC-MCNC: 75.6 MG/DL (ref 5–40)
WBC # BLD AUTO: 7.48 10*3/MM3 (ref 3.4–10.8)

## 2020-02-11 DIAGNOSIS — G47.33 OSA (OBSTRUCTIVE SLEEP APNEA): ICD-10-CM

## 2020-02-12 RX ORDER — TRAZODONE HYDROCHLORIDE 50 MG/1
TABLET ORAL
Qty: 45 TABLET | Refills: 0 | Status: SHIPPED | OUTPATIENT
Start: 2020-02-12 | End: 2020-02-18 | Stop reason: SDUPTHER

## 2020-02-13 ENCOUNTER — OFFICE VISIT (OUTPATIENT)
Dept: INTERNAL MEDICINE | Facility: CLINIC | Age: 60
End: 2020-02-13

## 2020-02-13 VITALS
HEART RATE: 97 BPM | SYSTOLIC BLOOD PRESSURE: 122 MMHG | RESPIRATION RATE: 16 BRPM | DIASTOLIC BLOOD PRESSURE: 82 MMHG | WEIGHT: 213 LBS | TEMPERATURE: 98.5 F | HEIGHT: 73 IN | BODY MASS INDEX: 28.23 KG/M2 | OXYGEN SATURATION: 97 %

## 2020-02-13 DIAGNOSIS — I10 ESSENTIAL HYPERTENSION: ICD-10-CM

## 2020-02-13 DIAGNOSIS — Z00.00 ENCOUNTER FOR ROUTINE ADULT HEALTH EXAMINATION WITHOUT ABNORMAL FINDINGS: Primary | ICD-10-CM

## 2020-02-13 DIAGNOSIS — E78.2 MIXED HYPERLIPIDEMIA: ICD-10-CM

## 2020-02-13 DIAGNOSIS — N52.2 DRUG-INDUCED ERECTILE DYSFUNCTION: ICD-10-CM

## 2020-02-13 DIAGNOSIS — E11.9 TYPE 2 DIABETES MELLITUS WITHOUT COMPLICATION, WITHOUT LONG-TERM CURRENT USE OF INSULIN (HCC): ICD-10-CM

## 2020-02-13 PROCEDURE — 99396 PREV VISIT EST AGE 40-64: CPT | Performed by: NURSE PRACTITIONER

## 2020-02-13 NOTE — PROGRESS NOTES
"DENNIS Ann is a 59 y.o. male presenting for Annual Exam (CPE, lab results)    His current/chronic health conditions include:  Patient Active Problem List   Diagnosis   • Coronary artery disease involving native coronary artery   • Type 2 diabetes mellitus (CMS/HCC)   • Essential hypertension   • AMIRA (obstructive sleep apnea)   • Drug-induced erectile dysfunction   • S/P coronary artery stent placement   • HLD (hyperlipidemia)   • Osteoarthritis of both knees   • Rupture of anterior cruciate ligament of left knee   • Personal history of MI (myocardial infarction)   • Ischemic dilated cardiomyopathy (CMS/HCC)   • Allergic rhinitis       Health Habits:  Dental Exam. not up to date - advised to schedule  Eye Exam. up to date  Exercise: 5 times/week.  Current exercise activities include: walking    Screenings:  PSA: 1.010  Colonoscopy: Ordered in May 2019 but he has not submitted paperwork to schedule  Tob use: former  Qualifies for lung Ca screening? yes  AAA screening: n/a    Complaints today include: none    He is feeling well. Tolerating his medication regimen w/o difficulty. He is UTD w/ specialty care.      The patient's allergies, current medications, problem list, past medical history, past family history, past medical history, and past social history were reviewed and updated as appropriate.    Review of Systems   Constitutional: Negative.    HENT: Negative.    Eyes: Negative.    Respiratory: Negative.    Cardiovascular: Negative.    Gastrointestinal: Negative.    Endocrine: Negative.    Genitourinary: Negative.    Musculoskeletal: Negative.    Skin: Negative.    Allergic/Immunologic: Negative.    Neurological: Negative.    Hematological: Negative.    Psychiatric/Behavioral: Negative.        OBJECTIVE    /82 (BP Location: Left arm, Patient Position: Sitting, Cuff Size: Large Adult)   Pulse 97   Temp 98.5 °F (36.9 °C) (Oral)   Resp 16   Ht 185.4 cm (73\")   Wt 96.6 kg (213 lb)   SpO2 97%   BMI " 28.10 kg/m²   Body mass index is 28.1 kg/m².  Nursing notes and vital signs reviewed.    Physical Exam   Constitutional: He is oriented to person, place, and time. He appears well-developed and well-nourished. No distress.   HENT:   Head: Normocephalic.   Right Ear: Hearing, tympanic membrane, external ear and ear canal normal.   Left Ear: Hearing, tympanic membrane, external ear and ear canal normal.   Nose: Nose normal. No mucosal edema or rhinorrhea.   Mouth/Throat: Uvula is midline, oropharynx is clear and moist and mucous membranes are normal. No tonsillar exudate.   Eyes: Pupils are equal, round, and reactive to light. Conjunctivae, EOM and lids are normal.   Neck: Normal range of motion. Neck supple. No thyroid mass and no thyromegaly present.   Cardiovascular: Regular rhythm, S1 normal, S2 normal and normal pulses. Exam reveals no gallop and no friction rub.   No murmur heard.  Pulmonary/Chest: Effort normal and breath sounds normal. He has no wheezes. He has no rhonchi. He has no rales.   Abdominal: Soft. Normal appearance and bowel sounds are normal. There is no hepatosplenomegaly. There is no tenderness. There is no guarding. No hernia.   Musculoskeletal: Normal range of motion. He exhibits no edema, tenderness or deformity.   Lymphadenopathy:     He has no cervical adenopathy.   Neurological: He is alert and oriented to person, place, and time. He has normal strength and normal reflexes. No cranial nerve deficit or sensory deficit.   Skin: Skin is warm, dry and intact. No lesion and no rash noted.   Psychiatric: He has a normal mood and affect. His speech is normal and behavior is normal. He is attentive.       ASSESSMENT AND PLAN    Seth was seen today for annual exam.    Diagnoses and all orders for this visit:    Encounter for routine adult health examination without abnormal findings    Essential hypertension    Mixed hyperlipidemia    Type 2 diabetes mellitus without complication, without long-term  current use of insulin (CMS/[facility])    Drug-induced erectile dysfunction        Labs reviewed. Preventative counseling completed.    Except as noted above, pt will continue current medications as noted in the medication list.  Continue to follow with specialty care as directed by them.  Patient counseled regarding therapeutic lifestyle changes including improved nutrition, increased exercise, and weight loss.  Advised him to get serious about nutrition to get trigs down.  Advised to schedule colon cancer screening.  Advised to get Shingles vaccine at pharmacy.  He declined LD CT for lung ca screening.  He declined pneumonia vaccine.      Return in about 6 months (around 8/13/2020)., or sooner if needed.

## 2020-02-18 DIAGNOSIS — G47.33 OSA (OBSTRUCTIVE SLEEP APNEA): ICD-10-CM

## 2020-02-18 RX ORDER — TRAZODONE HYDROCHLORIDE 50 MG/1
TABLET ORAL
Qty: 45 TABLET | Refills: 0 | Status: SHIPPED | OUTPATIENT
Start: 2020-02-18 | End: 2020-04-23

## 2020-02-21 RX ORDER — CARVEDILOL 3.12 MG/1
TABLET ORAL
Qty: 60 TABLET | Refills: 0 | Status: SHIPPED | OUTPATIENT
Start: 2020-02-21 | End: 2020-03-23

## 2020-03-13 RX ORDER — PITAVASTATIN CALCIUM 2.09 MG/1
TABLET, FILM COATED ORAL
Qty: 90 TABLET | Refills: 3 | Status: SHIPPED | OUTPATIENT
Start: 2020-03-13 | End: 2021-03-08

## 2020-03-23 RX ORDER — CARVEDILOL 3.12 MG/1
TABLET ORAL
Qty: 60 TABLET | Refills: 5 | Status: SHIPPED | OUTPATIENT
Start: 2020-03-23 | End: 2020-10-06

## 2020-04-14 DIAGNOSIS — G47.33 OSA (OBSTRUCTIVE SLEEP APNEA): ICD-10-CM

## 2020-04-14 RX ORDER — TRAZODONE HYDROCHLORIDE 50 MG/1
TABLET ORAL
Qty: 45 TABLET | Refills: 14 | OUTPATIENT
Start: 2020-04-14

## 2020-04-17 DIAGNOSIS — E11.9 TYPE 2 DIABETES MELLITUS WITHOUT COMPLICATION, WITHOUT LONG-TERM CURRENT USE OF INSULIN (HCC): ICD-10-CM

## 2020-04-23 ENCOUNTER — TELEMEDICINE (OUTPATIENT)
Dept: INTERNAL MEDICINE | Facility: CLINIC | Age: 60
End: 2020-04-23

## 2020-04-23 DIAGNOSIS — F33.1 MODERATE EPISODE OF RECURRENT MAJOR DEPRESSIVE DISORDER (HCC): Primary | ICD-10-CM

## 2020-04-23 PROCEDURE — 99214 OFFICE O/P EST MOD 30 MIN: CPT | Performed by: NURSE PRACTITIONER

## 2020-04-23 RX ORDER — TRAZODONE HYDROCHLORIDE 50 MG/1
TABLET ORAL
Qty: 60 TABLET | Refills: 0 | Status: SHIPPED | OUTPATIENT
Start: 2020-04-23 | End: 2020-07-06

## 2020-04-23 RX ORDER — TRAZODONE HYDROCHLORIDE 50 MG/1
TABLET ORAL
Qty: 180 TABLET | Refills: 0 | Status: SHIPPED | OUTPATIENT
Start: 2020-04-23 | End: 2020-05-04

## 2020-04-23 NOTE — PROGRESS NOTES
"Seth Manuel is a 59 y.o. male presenting today for   Chief Complaint   Patient presents with   • Depression     This visit was conducted via Video Telehealth.        Subjective    History of Present Illness     Seth presents w/ c/o depression. This has been a chronic issue since the 1980s. He previously took Wellbutrin in the late 80s or early 90s. This has been exacerbated since 2009 when he experienced a job loss. Following that he had 3 moves in 4 years. Symptoms have been constant but waxing and waning. He sts \"I hid it very well\" but \"It is to a point now where I cannot hide it.\" Social isolation due to COVID-19 pandemic has exacerbated S&S. S&S include a lack of interest in doing anything. \"Feel empty inside.\" Constant worry. He is having trouble sleeping. Sleep med had prescribed him Trazodone in the past but he has been out of this for several weeks.      The following portions of the patient's history were reviewed and updated as appropriate: allergies, current medications, problem list, past medical history, past surgical history, family history, and social history.        PHQ-9 Depression Screening  Little interest or pleasure in doing things?  3   Feeling down, depressed, or hopeless?  3   Trouble falling or staying asleep, or sleeping too much?  2   Feeling tired or having little energy?  3   Poor appetite or overeating?  1   Feeling bad about yourself - or that you are a failure or have let yourself or your family down?  3   Trouble concentrating on things, such as reading the newspaper or watching television?  2   Moving or speaking so slowly that other people could have noticed? Or the opposite - being so fidgety or restless that you have been moving around a lot more than usual?  2   Thoughts that you would be better off dead, or of hurting yourself in some way?  0   PHQ-9 Total Score  19   If you checked off any problems, how difficult have these problems made it for you to do your work, take care " of things at home, or get along with other people?  difficult         Review of Systems   Constitutional: Positive for fatigue.   Psychiatric/Behavioral: Positive for decreased concentration, sleep disturbance and depressed mood. Negative for self-injury and suicidal ideas.         Objective      Physical Exam   Constitutional: He is oriented to person, place, and time. He appears well-developed and well-nourished. No distress.   Pulmonary/Chest: Effort normal. No respiratory distress.   Neurological: He is alert and oriented to person, place, and time.   Psychiatric: He has a normal mood and affect. His speech is normal. Thought content normal.         Assessment and Plan    Seth was seen today for depression.    Diagnoses and all orders for this visit:    Moderate episode of recurrent major depressive disorder (CMS/HCC)  -     sertraline (Zoloft) 50 MG tablet; Take 1 tablet by mouth Daily.  -     traZODone (DESYREL) 50 MG tablet; 1-2 tablets PO at bedtime  -     traZODone (DESYREL) 50 MG tablet; 1-2 tablets PO at bedtime  -     sertraline (Zoloft) 50 MG tablet; Take 1 tablet by mouth Daily.  -     Ambulatory Referral to Psychology      Pt advised to contact my office w/ any SEs of medication.  Discussed increased risk of completed suicidality in first weeks of treatment. Advised to go to ER or call crisis hotline with any thoughts of harm to self or others. PVU.        Medications, including side effects, were discussed with the patient. Patient verbalized understanding.  The plan of care was discussed. All questions were answered. Patient verbalized understanding.        Return in about 6 weeks (around 6/4/2020).

## 2020-04-30 RX ORDER — MONTELUKAST SODIUM 10 MG/1
TABLET ORAL
Qty: 90 TABLET | Refills: 3 | Status: SHIPPED | OUTPATIENT
Start: 2020-04-30 | End: 2021-04-25

## 2020-05-04 ENCOUNTER — HOSPITAL ENCOUNTER (EMERGENCY)
Facility: HOSPITAL | Age: 60
Discharge: HOME | End: 2020-05-04
Attending: EMERGENCY MEDICINE | Admitting: EMERGENCY MEDICINE

## 2020-05-04 ENCOUNTER — APPOINTMENT (OUTPATIENT)
Dept: GENERAL RADIOLOGY | Facility: HOSPITAL | Age: 60
End: 2020-05-04

## 2020-05-04 VITALS
RESPIRATION RATE: 16 BRPM | HEIGHT: 73 IN | OXYGEN SATURATION: 93 % | HEART RATE: 109 BPM | WEIGHT: 215 LBS | BODY MASS INDEX: 28.49 KG/M2 | TEMPERATURE: 97.6 F | DIASTOLIC BLOOD PRESSURE: 89 MMHG | SYSTOLIC BLOOD PRESSURE: 133 MMHG

## 2020-05-04 DIAGNOSIS — R07.89 ATYPICAL CHEST PAIN: Primary | ICD-10-CM

## 2020-05-04 LAB
ALBUMIN SERPL-MCNC: 4.1 G/DL (ref 3.5–5.2)
ALBUMIN/GLOB SERPL: 1.6 G/DL
ALP SERPL-CCNC: 49 U/L (ref 39–117)
ALT SERPL W P-5'-P-CCNC: 24 U/L (ref 1–41)
ANION GAP SERPL CALCULATED.3IONS-SCNC: 14.3 MMOL/L (ref 5–15)
AST SERPL-CCNC: 38 U/L (ref 1–40)
BASOPHILS # BLD AUTO: 0.05 10*3/MM3 (ref 0–0.2)
BASOPHILS NFR BLD AUTO: 0.4 % (ref 0–1.5)
BILIRUB SERPL-MCNC: 0.3 MG/DL (ref 0.2–1.2)
BUN BLD-MCNC: 9 MG/DL (ref 6–20)
BUN/CREAT SERPL: 11.3 (ref 7–25)
CALCIUM SPEC-SCNC: 8.3 MG/DL (ref 8.6–10.5)
CHLORIDE SERPL-SCNC: 94 MMOL/L (ref 98–107)
CO2 SERPL-SCNC: 24.7 MMOL/L (ref 22–29)
CREAT BLD-MCNC: 0.8 MG/DL (ref 0.76–1.27)
DEPRECATED RDW RBC AUTO: 46.5 FL (ref 37–54)
EOSINOPHIL # BLD AUTO: 0.11 10*3/MM3 (ref 0–0.4)
EOSINOPHIL NFR BLD AUTO: 1 % (ref 0.3–6.2)
ERYTHROCYTE [DISTWIDTH] IN BLOOD BY AUTOMATED COUNT: 13.6 % (ref 12.3–15.4)
GFR SERPL CREATININE-BSD FRML MDRD: 99 ML/MIN/1.73
GLOBULIN UR ELPH-MCNC: 2.6 GM/DL
GLUCOSE BLD-MCNC: 110 MG/DL (ref 65–99)
HCT VFR BLD AUTO: 44.2 % (ref 37.5–51)
HGB BLD-MCNC: 15.4 G/DL (ref 13–17.7)
IMM GRANULOCYTES # BLD AUTO: 0.06 10*3/MM3 (ref 0–0.05)
IMM GRANULOCYTES NFR BLD AUTO: 0.5 % (ref 0–0.5)
LYMPHOCYTES # BLD AUTO: 2.38 10*3/MM3 (ref 0.7–3.1)
LYMPHOCYTES NFR BLD AUTO: 21 % (ref 19.6–45.3)
MCH RBC QN AUTO: 32.1 PG (ref 26.6–33)
MCHC RBC AUTO-ENTMCNC: 34.8 G/DL (ref 31.5–35.7)
MCV RBC AUTO: 92.1 FL (ref 79–97)
MONOCYTES # BLD AUTO: 0.95 10*3/MM3 (ref 0.1–0.9)
MONOCYTES NFR BLD AUTO: 8.4 % (ref 5–12)
NEUTROPHILS # BLD AUTO: 7.79 10*3/MM3 (ref 1.7–7)
NEUTROPHILS NFR BLD AUTO: 68.7 % (ref 42.7–76)
NRBC BLD AUTO-RTO: 0 /100 WBC (ref 0–0.2)
PLATELET # BLD AUTO: 249 10*3/MM3 (ref 140–450)
PMV BLD AUTO: 8.2 FL (ref 6–12)
POTASSIUM BLD-SCNC: 3.8 MMOL/L (ref 3.5–5.2)
PROT SERPL-MCNC: 6.7 G/DL (ref 6–8.5)
RBC # BLD AUTO: 4.8 10*6/MM3 (ref 4.14–5.8)
SODIUM BLD-SCNC: 133 MMOL/L (ref 136–145)
TROPONIN T SERPL-MCNC: <0.01 NG/ML (ref 0–0.03)
WBC NRBC COR # BLD: 11.34 10*3/MM3 (ref 3.4–10.8)

## 2020-05-04 PROCEDURE — 93005 ELECTROCARDIOGRAM TRACING: CPT | Performed by: EMERGENCY MEDICINE

## 2020-05-04 PROCEDURE — 99284 EMERGENCY DEPT VISIT MOD MDM: CPT

## 2020-05-04 PROCEDURE — 84484 ASSAY OF TROPONIN QUANT: CPT | Performed by: EMERGENCY MEDICINE

## 2020-05-04 PROCEDURE — 93005 ELECTROCARDIOGRAM TRACING: CPT

## 2020-05-04 PROCEDURE — 85025 COMPLETE CBC W/AUTO DIFF WBC: CPT | Performed by: EMERGENCY MEDICINE

## 2020-05-04 PROCEDURE — 80053 COMPREHEN METABOLIC PANEL: CPT | Performed by: EMERGENCY MEDICINE

## 2020-05-04 PROCEDURE — 93010 ELECTROCARDIOGRAM REPORT: CPT | Performed by: INTERNAL MEDICINE

## 2020-05-04 PROCEDURE — 71045 X-RAY EXAM CHEST 1 VIEW: CPT

## 2020-05-04 RX ORDER — SODIUM CHLORIDE 0.9 % (FLUSH) 0.9 %
10 SYRINGE (ML) INJECTION AS NEEDED
Status: DISCONTINUED | OUTPATIENT
Start: 2020-05-04 | End: 2020-05-05 | Stop reason: HOSPADM

## 2020-05-05 NOTE — ED NOTES
Patient provided discharge instructions at this time.  Respirations are even and unlabored, chest rise and fall is equal in expansion.  All patients questions answered prior to departure from the ED.  Pt ambulated from ED with steady gait, capillary refill within normal limit, speech normal.       Pat De Anda RN  05/04/20 0155

## 2020-05-05 NOTE — ED PROVIDER NOTES
EMERGENCY DEPARTMENT ENCOUNTER    CHIEF COMPLAINT  Chief Complaint: chest pain  History given by: pt  History limited by: none  Room Number: 10/10  PMD: Joselyn Ross APRN      HPI:  Pt is a 59 y.o. male who presents complaining of chest pain described as sharp in nature that began fairly abruptly at approximately noon today, so approximately 11 hours ago.  This patient states that the pain was exacerbated by taking deep breaths and was not relieved by any position or any medication in particular.  He has had an MI in the past and he has 1 stent previously.  He states today's pain feels nothing like that did in the past.  He denies shortness of breath, nausea and vomiting, radiation of any pain, sweatiness.  He states the pain is essentially now resolved.    PAST MEDICAL HISTORY  Active Ambulatory Problems     Diagnosis Date Noted   • Coronary artery disease involving native coronary artery 04/27/2017   • Type 2 diabetes mellitus (CMS/HCC) 04/27/2017   • Essential hypertension 04/27/2017   • AMIRA (obstructive sleep apnea) 04/27/2017   • Drug-induced erectile dysfunction 04/27/2017   • S/P coronary artery stent placement 04/27/2017   • HLD (hyperlipidemia) 04/27/2017   • Osteoarthritis of both knees 04/27/2017   • Rupture of anterior cruciate ligament of left knee 04/27/2017   • Personal history of MI (myocardial infarction) 04/27/2017   • Ischemic dilated cardiomyopathy (CMS/HCC) 04/27/2017   • Allergic rhinitis 10/27/2017     Resolved Ambulatory Problems     Diagnosis Date Noted   • Drug-induced erectile dysfunction 08/14/2017   • Sinus problem 10/01/2017   • Maxillary sinusitis 10/01/2017   • Fever 10/01/2017     Past Medical History:   Diagnosis Date   • Arthritis    • Erectile dysfunction    • Hyperlipidemia    • Hypertension    • Myocardial infarction (CMS/HCC) 07/11/2014   • Sleep apnea        PAST SURGICAL HISTORY  Past Surgical History:   Procedure Laterality Date   • CORONARY STENT PLACEMENT  2014     Medtronic Resolute stent into LAD   • HERNIA REPAIR      Umbilical    • TONSILLECTOMY         FAMILY HISTORY  Family History   Problem Relation Age of Onset   • Hypertension Mother    • Diabetes Mother    • Graves' disease Mother    • Rheum arthritis Mother    • Heart attack Mother 50   • Hypertension Father    • Cancer Father         Throat   • Stroke Father 76   • Heart attack Father 50   • Diabetes Brother    • Lung cancer Paternal Uncle    • Rheum arthritis Maternal Grandmother    • Heart disease Maternal Grandfather    • Diabetes Paternal Grandmother    • Heart disease Paternal Grandfather        SOCIAL HISTORY  Social History     Socioeconomic History   • Marital status:      Spouse name: Not on file   • Number of children: Not on file   • Years of education: Not on file   • Highest education level: Not on file   Tobacco Use   • Smoking status: Former Smoker     Packs/day: 1.00     Years: 40.00     Pack years: 40.00     Types: Cigarettes     Start date:      Last attempt to quit:      Years since quittin.3   • Smokeless tobacco: Never Used   Substance and Sexual Activity   • Alcohol use: Yes     Alcohol/week: 6.0 standard drinks     Types: 6 Cans of beer per week   • Drug use: No   • Sexual activity: Defer       ALLERGIES  Crestor [rosuvastatin calcium] and Lipitor [atorvastatin]    REVIEW OF SYSTEMS  Review of Systems   Constitutional: Negative for activity change, appetite change and fever.   HENT: Negative for congestion and sore throat.    Eyes: Negative.    Respiratory: Negative for cough and shortness of breath.    Cardiovascular: Positive for chest pain. Negative for leg swelling.   Gastrointestinal: Negative for abdominal pain, diarrhea and vomiting.   Endocrine: Negative.    Genitourinary: Negative for decreased urine volume and dysuria.   Musculoskeletal: Negative for neck pain.   Skin: Negative for rash and wound.   Allergic/Immunologic: Negative.    Neurological: Negative  for weakness, numbness and headaches.   Hematological: Negative.    Psychiatric/Behavioral: Negative.    All other systems reviewed and are negative.      PHYSICAL EXAM  ED Triage Vitals   Temp Heart Rate Resp BP SpO2   05/04/20 2141 05/04/20 2141 05/04/20 2141 05/04/20 2155 05/04/20 2141   97.6 °F (36.4 °C) 118 18 142/91 98 %      Temp src Heart Rate Source Patient Position BP Location FiO2 (%)   05/04/20 2141 05/04/20 2141 -- -- --   Tympanic Monitor          Physical Exam   Constitutional: He is oriented to person, place, and time. No distress.   HENT:   Head: Normocephalic and atraumatic.   Eyes: Pupils are equal, round, and reactive to light. EOM are normal.   Neck: Normal range of motion. Neck supple.   Cardiovascular: Normal rate, regular rhythm and normal heart sounds.   Pulmonary/Chest: Effort normal and breath sounds normal. No respiratory distress. He exhibits tenderness.   Mild tenderness to the left lower chest wall/rib region without palpable fracture or crepitus present.  The symptoms were reproduced with palpation   Abdominal: Soft. There is no tenderness. There is no rebound and no guarding.   Musculoskeletal: Normal range of motion. He exhibits no edema.   Neurological: He is alert and oriented to person, place, and time. He has normal sensation and normal strength.   Skin: Skin is warm and dry.   Psychiatric: Mood and affect normal.   Nursing note and vitals reviewed.      LAB RESULTS  Lab Results (last 24 hours)     Procedure Component Value Units Date/Time    CBC & Differential [[id number]] Collected:  05/04/20 2154    Specimen:  Blood Updated:  05/04/20 2209    Narrative:       The following orders were created for panel order CBC & Differential.  Procedure                               Abnormality         Status                     ---------                               -----------         ------                     CBC Auto Differential[[id number]]        Abnormal            Final result                  Please view results for these tests on the individual orders.    Comprehensive Metabolic Panel [[id number]]  (Abnormal) Collected:  05/04/20 2154    Specimen:  Blood Updated:  05/04/20 2229     Glucose 110 mg/dL      BUN 9 mg/dL      Creatinine 0.80 mg/dL      Sodium 133 mmol/L      Potassium 3.8 mmol/L      Chloride 94 mmol/L      CO2 24.7 mmol/L      Calcium 8.3 mg/dL      Total Protein 6.7 g/dL      Albumin 4.10 g/dL      ALT (SGPT) 24 U/L      AST (SGOT) 38 U/L      Alkaline Phosphatase 49 U/L      Total Bilirubin 0.3 mg/dL      eGFR Non African Amer 99 mL/min/1.73      Globulin 2.6 gm/dL      A/G Ratio 1.6 g/dL      BUN/Creatinine Ratio 11.3     Anion Gap 14.3 mmol/L     Narrative:       GFR Normal >60  Chronic Kidney Disease <60  Kidney Failure <15      Troponin [[id number]]  (Normal) Collected:  05/04/20 2154    Specimen:  Blood Updated:  05/04/20 2229     Troponin T <0.010 ng/mL     Narrative:       Troponin T Reference Range:  <= 0.03 ng/mL-   Negative for AMI  >0.03 ng/mL-     Abnormal for myocardial necrosis.  Clinicians would have to utilize clinical acumen, EKG, Troponin and serial changes to determine if it is an Acute Myocardial Infarction or myocardial injury due to an underlying chronic condition.       Results may be falsely decreased if patient taking Biotin.      CBC Auto Differential [[id number]]  (Abnormal) Collected:  05/04/20 2154    Specimen:  Blood Updated:  05/04/20 2209     WBC 11.34 10*3/mm3      RBC 4.80 10*6/mm3      Hemoglobin 15.4 g/dL      Hematocrit 44.2 %      MCV 92.1 fL      MCH 32.1 pg      MCHC 34.8 g/dL      RDW 13.6 %      RDW-SD 46.5 fl      MPV 8.2 fL      Platelets 249 10*3/mm3      Neutrophil % 68.7 %      Lymphocyte % 21.0 %      Monocyte % 8.4 %      Eosinophil % 1.0 %      Basophil % 0.4 %      Immature Grans % 0.5 %      Neutrophils, Absolute 7.79 10*3/mm3      Lymphocytes, Absolute 2.38 10*3/mm3      Monocytes, Absolute 0.95 10*3/mm3      Eosinophils,  Absolute 0.11 10*3/mm3      Basophils, Absolute 0.05 10*3/mm3      Immature Grans, Absolute 0.06 10*3/mm3      nRBC 0.0 /100 WBC           I ordered the above labs and reviewed the results    RADIOLOGY  XR Chest 1 View   Final Result   Mild bibasilar atelectasis versus scarring.       This report was finalized on 5/4/2020 10:26 PM by Dr. Layne Hunt M.D.               I ordered the above noted radiological studies. Interpreted by radiologist.  Reviewed by me in PACS.       PROCEDURES  Procedures  EKG          EKG time: 2149  Rhythm/Rate: sinus tachycardia, 109  P waves and WV: nml  QRS, axis: inferior/lateral Q waves, nml axis   ST and T waves: nonspecific changes     Interpreted Contemporaneously by me, independently viewed  No previous available for comparison      PROGRESS AND CONSULTS     The patient was wearing a facemask upon entrance into the room and remained in such throughout their visit.  I was wearing PPE including a facemask as well as gloves at any point entering the room and throughout the visit    2330  Patient states the pain is now essentially gone and since work-up is negative including a troponin that is 10 to 11 hours post pain onset, he would like to go home and follow-up with his cardiologist.  This definitely seems appropriate given the atypical nature of his symptoms.  The patient will follow-up with his cardiologist.  All questions answered      MEDICAL DECISION MAKING  Results were reviewed/discussed with the patient and they were also made aware of online access. Pt also made aware that some labs, such as cultures, will not be resulted during ER visit and follow up with PMD is necessary.     MDM  Number of Diagnoses or Management Options     Amount and/or Complexity of Data Reviewed  Clinical lab tests: ordered and reviewed  Tests in the radiology section of CPT®: ordered and reviewed  Review and summarize past medical records: yes (No previous ED visits, but multiple outpatient  visits for management of type 2 diabetes as well as hypertension was reviewed)           DIAGNOSIS  Final diagnoses:   Atypical chest pain       DISPOSITION  DISCHARGE    Patient discharged in stable condition.    Reviewed implications of results, diagnosis, meds, responsibility to follow up, warning signs and symptoms of possible worsening, potential complications and reasons to return to ER    Patient/Family voiced understanding of above instructions.    Discussed plan for discharge, as there is no emergent indication for admission. Patient referred to primary care provider for BP management due to today's BP. Pt/family is agreeable and understands need for follow up and repeat testing.  Pt is aware that discharge does not mean that nothing is wrong but it indicates no emergency is present that requires admission and they must continue care with follow-up as given below or physician of their choice.     FOLLOW-UP  Joselyn Ross, APRN  [address]  [address]  [facility] [address] [phone]    Schedule an appointment as soon as possible for a visit       [facility]  [address]  [facility] [address] [phone]             Medication List      No changes were made to your prescriptions during this visit.           Latest Documented Vital Signs:  As of 23:29  BP- 133/89 HR- 109 Temp- 97.6 °F (36.4 °C) (Tympanic) O2 sat- 93%         Adrian Austin MD  05/04/20 9289

## 2020-05-05 NOTE — ED NOTES
Pt reports L sided rib pain that began this morning. Pt reports it has been going on all day but denies SOA with pain. Pt appears in NAD and is A&O x4. Pt also c/o ringing to both ears and a generalized headache at this time.     Nadege Ching, PETER  05/04/20 9878

## 2020-05-05 NOTE — ED TRIAGE NOTES
Pt to ER via PV. Pt states aching CP on his left lower chest that started x4 hours. Hx of cardiac stents.     Pt's wife states she came home tonight and found him with his nose bleeding and believes he has been drinking tonight. Pt hx of alcohol abuse.     Pt placed in mask at triage

## 2020-05-22 RX ORDER — FAMOTIDINE 40 MG/1
TABLET, FILM COATED ORAL
Qty: 90 TABLET | Refills: 3 | Status: SHIPPED | OUTPATIENT
Start: 2020-05-22 | End: 2021-05-17

## 2020-07-04 DIAGNOSIS — F33.1 MODERATE EPISODE OF RECURRENT MAJOR DEPRESSIVE DISORDER (HCC): ICD-10-CM

## 2020-07-06 RX ORDER — TRAZODONE HYDROCHLORIDE 50 MG/1
TABLET ORAL
Qty: 180 TABLET | Refills: 3 | Status: SHIPPED | OUTPATIENT
Start: 2020-07-06 | End: 2021-06-08

## 2020-08-06 ENCOUNTER — LAB (OUTPATIENT)
Dept: INTERNAL MEDICINE | Facility: CLINIC | Age: 60
End: 2020-08-06

## 2020-08-06 DIAGNOSIS — Z79.899 HIGH RISK MEDICATION USE: ICD-10-CM

## 2020-08-06 DIAGNOSIS — I10 HYPERTENSION, ESSENTIAL: ICD-10-CM

## 2020-08-06 DIAGNOSIS — E11.8 CONTROLLED DIABETES MELLITUS TYPE 2 WITH COMPLICATIONS, UNSPECIFIED WHETHER LONG TERM INSULIN USE (HCC): ICD-10-CM

## 2020-08-06 DIAGNOSIS — R97.20 ELEVATED PSA: ICD-10-CM

## 2020-08-06 DIAGNOSIS — E78.5 HYPERLIPIDEMIA, UNSPECIFIED HYPERLIPIDEMIA TYPE: ICD-10-CM

## 2020-08-06 DIAGNOSIS — E11.8 CONTROLLED DIABETES MELLITUS TYPE 2 WITH COMPLICATIONS, UNSPECIFIED WHETHER LONG TERM INSULIN USE (HCC): Primary | ICD-10-CM

## 2020-08-07 LAB
ALBUMIN SERPL-MCNC: 4.6 G/DL (ref 3.5–5.2)
ALBUMIN/GLOB SERPL: 2.9 G/DL
ALP SERPL-CCNC: 56 U/L (ref 39–117)
ALT SERPL-CCNC: 27 U/L (ref 1–41)
AST SERPL-CCNC: 28 U/L (ref 1–40)
BASOPHILS # BLD AUTO: 0.05 10*3/MM3 (ref 0–0.2)
BASOPHILS NFR BLD AUTO: 0.8 % (ref 0–1.5)
BILIRUB SERPL-MCNC: 0.7 MG/DL (ref 0–1.2)
BUN SERPL-MCNC: 11 MG/DL (ref 6–20)
BUN/CREAT SERPL: 10.4 (ref 7–25)
CALCIUM SERPL-MCNC: 9.4 MG/DL (ref 8.6–10.5)
CHLORIDE SERPL-SCNC: 99 MMOL/L (ref 98–107)
CHOLEST SERPL-MCNC: 186 MG/DL (ref 0–200)
CHOLEST/HDLC SERPL: 4.13 {RATIO}
CO2 SERPL-SCNC: 27.5 MMOL/L (ref 22–29)
CREAT SERPL-MCNC: 1.06 MG/DL (ref 0.76–1.27)
EOSINOPHIL # BLD AUTO: 0.16 10*3/MM3 (ref 0–0.4)
EOSINOPHIL NFR BLD AUTO: 2.5 % (ref 0.3–6.2)
ERYTHROCYTE [DISTWIDTH] IN BLOOD BY AUTOMATED COUNT: 13.7 % (ref 12.3–15.4)
GLOBULIN SER CALC-MCNC: 1.6 GM/DL
GLUCOSE SERPL-MCNC: 124 MG/DL (ref 65–99)
HBA1C MFR BLD: 5.9 % (ref 4.8–5.6)
HCT VFR BLD AUTO: 48.8 % (ref 37.5–51)
HDLC SERPL-MCNC: 45 MG/DL (ref 40–60)
HGB BLD-MCNC: 16.1 G/DL (ref 13–17.7)
IMM GRANULOCYTES # BLD AUTO: 0.03 10*3/MM3 (ref 0–0.05)
IMM GRANULOCYTES NFR BLD AUTO: 0.5 % (ref 0–0.5)
LDLC SERPL CALC-MCNC: ABNORMAL MG/DL
LYMPHOCYTES # BLD AUTO: 1.56 10*3/MM3 (ref 0.7–3.1)
LYMPHOCYTES NFR BLD AUTO: 24.6 % (ref 19.6–45.3)
MCH RBC QN AUTO: 31.4 PG (ref 26.6–33)
MCHC RBC AUTO-ENTMCNC: 33 G/DL (ref 31.5–35.7)
MCV RBC AUTO: 95.3 FL (ref 79–97)
MONOCYTES # BLD AUTO: 0.75 10*3/MM3 (ref 0.1–0.9)
MONOCYTES NFR BLD AUTO: 11.8 % (ref 5–12)
NEUTROPHILS # BLD AUTO: 3.8 10*3/MM3 (ref 1.7–7)
NEUTROPHILS NFR BLD AUTO: 59.8 % (ref 42.7–76)
NRBC BLD AUTO-RTO: 0 /100 WBC (ref 0–0.2)
PLATELET # BLD AUTO: 260 10*3/MM3 (ref 140–450)
POTASSIUM SERPL-SCNC: 4.8 MMOL/L (ref 3.5–5.2)
PROT SERPL-MCNC: 6.2 G/DL (ref 6–8.5)
PSA SERPL-MCNC: 0.96 NG/ML (ref 0–4)
RBC # BLD AUTO: 5.12 10*6/MM3 (ref 4.14–5.8)
SODIUM SERPL-SCNC: 139 MMOL/L (ref 136–145)
T4 FREE SERPL-MCNC: 1.54 NG/DL (ref 0.93–1.7)
TRIGL SERPL-MCNC: 406 MG/DL (ref 0–150)
TSH SERPL DL<=0.005 MIU/L-ACNC: 1.77 UIU/ML (ref 0.27–4.2)
VLDLC SERPL CALC-MCNC: ABNORMAL MG/DL
WBC # BLD AUTO: 6.35 10*3/MM3 (ref 3.4–10.8)

## 2020-08-13 ENCOUNTER — OFFICE VISIT (OUTPATIENT)
Dept: INTERNAL MEDICINE | Facility: CLINIC | Age: 60
End: 2020-08-13

## 2020-08-13 VITALS
HEIGHT: 73 IN | BODY MASS INDEX: 27.94 KG/M2 | SYSTOLIC BLOOD PRESSURE: 122 MMHG | RESPIRATION RATE: 16 BRPM | OXYGEN SATURATION: 99 % | WEIGHT: 210.8 LBS | HEART RATE: 95 BPM | TEMPERATURE: 97 F | DIASTOLIC BLOOD PRESSURE: 80 MMHG

## 2020-08-13 DIAGNOSIS — F33.1 MODERATE EPISODE OF RECURRENT MAJOR DEPRESSIVE DISORDER (HCC): ICD-10-CM

## 2020-08-13 DIAGNOSIS — G47.20 SLEEP PATTERN DISTURBANCE: ICD-10-CM

## 2020-08-13 DIAGNOSIS — I10 HYPERTENSION, ESSENTIAL: Primary | ICD-10-CM

## 2020-08-13 DIAGNOSIS — E11.9 TYPE 2 DIABETES MELLITUS WITHOUT COMPLICATION, WITHOUT LONG-TERM CURRENT USE OF INSULIN (HCC): ICD-10-CM

## 2020-08-13 DIAGNOSIS — E78.5 HYPERLIPIDEMIA, UNSPECIFIED HYPERLIPIDEMIA TYPE: ICD-10-CM

## 2020-08-13 PROCEDURE — 99214 OFFICE O/P EST MOD 30 MIN: CPT | Performed by: NURSE PRACTITIONER

## 2020-08-13 RX ORDER — OMEGA-3-ACID ETHYL ESTERS 1 G/1
2 CAPSULE, LIQUID FILLED ORAL 2 TIMES DAILY
Qty: 360 CAPSULE | Refills: 3 | Status: SHIPPED | OUTPATIENT
Start: 2020-08-13 | End: 2021-07-22

## 2020-08-13 NOTE — PROGRESS NOTES
Subjective   Seth Manuel is a 59 y.o. male presenting today for follow up of   Chief Complaint   Patient presents with   • Follow-up     6 mo.   • Hypertension       History of Present Illness     Patient Active Problem List   Diagnosis   • Coronary artery disease involving native coronary artery   • Type 2 diabetes mellitus (CMS/HCC)   • Essential hypertension   • AMIRA (obstructive sleep apnea)   • Drug-induced erectile dysfunction   • S/P coronary artery stent placement   • HLD (hyperlipidemia)   • Osteoarthritis of both knees   • Rupture of anterior cruciate ligament of left knee   • Personal history of MI (myocardial infarction)   • Ischemic dilated cardiomyopathy (CMS/HCC)   • Allergic rhinitis   • Moderate episode of recurrent major depressive disorder (CMS/HCC)   • Sleep pattern disturbance         Current Outpatient Medications:   •  Ascorbic Acid (VITAMIN C PO), Take 3,000 Units by mouth., Disp: , Rfl:   •  aspirin 81 MG EC tablet, Take 81 mg by mouth Daily., Disp: , Rfl:   •  carvedilol (COREG) 3.125 MG tablet, TAKE ONE TABLET BY MOUTH TWICE A DAY WITH MEALS, Disp: 60 tablet, Rfl: 5  •  Coenzyme Q10 (COQ10 PO), Take 250 mg by mouth 2 (Two) Times a Day., Disp: , Rfl:   •  DHEA 50 MG tablet, Take 1 tablet by mouth Daily., Disp: , Rfl:   •  famotidine (PEPCID) 40 MG tablet, TAKE 1 TABLET DAILY, Disp: 90 tablet, Rfl: 3  •  LIVALO 2 MG tablet tablet, TAKE 1 TABLET EVERY NIGHT, Disp: 90 tablet, Rfl: 3  •  melatonin 3 MG tablet, Take 3 mg by mouth Every Night., Disp: , Rfl:   •  metFORMIN (GLUCOPHAGE) 500 MG tablet, Take 1 tablet by mouth 2 (Two) Times a Day., Disp: 180 tablet, Rfl: 3  •  montelukast (SINGULAIR) 10 MG tablet, TAKE 1 TABLET EVERY NIGHT, Disp: 90 tablet, Rfl: 3  •  sertraline (ZOLOFT) 50 MG tablet, TAKE 1 TABLET DAILY, Disp: 90 tablet, Rfl: 3  •  tadalafil (CIALIS) 5 MG tablet, Take 1 tablet by mouth Daily., Disp: 90 tablet, Rfl: 0  •  traZODone (DESYREL) 50 MG tablet, TAKE 1 TO 2 TABLETS AT  "BEDTIME, Disp: 180 tablet, Rfl: 3  •  omega-3 acid ethyl esters (Lovaza) 1 g capsule, Take 2 capsules by mouth 2 (Two) Times a Day., Disp: 360 capsule, Rfl: 3    Mr. Manuel has HTN, HLD, and DM. He reports his is doing well. He is tolerating his current medication regimen w/o SEs.     He generally follows a paleo diet and exercises regularly but he has not been eating and well or exercising as often.     Mr. Manuel and I met via telehealth on 04/23. My notes from that visit:  Seht presents w/ c/o depression. This has been a chronic issue since the 1980s. He previously took Wellbutrin in the late 80s or early 90s. This has been exacerbated since 2009 when he experienced a job loss. Following that he had 3 moves in 4 years. Symptoms have been constant but waxing and waning. He sts \"I hid it very well\" but \"It is to a point now where I cannot hide it.\" Social isolation due to COVID-19 pandemic has exacerbated S&S. S&S include a lack of interest in doing anything. \"Feel empty inside.\" Constant worry. He is having trouble sleeping. Sleep med had prescribed him Trazodone in the past but he has been out of this for several weeks.    We started Zoloft, restarted Trazodone, and made referral to mental health.  He met w/ a counselor w/ [facility]. They met weekly for several weeks and sts this was helpful.  \"My insurance is all screwed up.\" So he had to stop counseling.  He is back to work and this helps.  No SEs r/t Zoloft or Trazodone.  Moods are much improved w/ Zoloft.  His sleep in much improved w/ Trazodone.    The following portions of the patient's history were reviewed and updated as appropriate: allergies, current medications, past family history, past medical history, past social history, past surgical history and problem list.    Review of Systems   Constitutional: Negative.    HENT: Negative.    Eyes: Negative.    Respiratory: Negative.    Cardiovascular: Negative.    Gastrointestinal: Negative.    Genitourinary: " "Positive for erectile dysfunction.   Musculoskeletal: Positive for arthralgias.   Skin: Negative.    Neurological: Negative.    Psychiatric/Behavioral: Negative.        Objective   Vitals:    08/13/20 0942   BP: 122/80   BP Location: Left arm   Patient Position: Sitting   Cuff Size: Large Adult   Pulse: 95   Resp: 16   Temp: 97 °F (36.1 °C)   TempSrc: Tympanic   SpO2: 99%   Weight: 95.6 kg (210 lb 12.8 oz)   Height: 185.4 cm (72.99\")     Body mass index is 27.82 kg/m².  Nursing notes and vital reviewed.    Physical Exam   Constitutional: He is oriented to person, place, and time. He appears well-developed and well-nourished. No distress.   Neck: Neck supple. No thyroid mass and no thyromegaly present.   Cardiovascular: Regular rhythm and normal heart sounds.   Pulmonary/Chest: Effort normal and breath sounds normal.   Lymphadenopathy:     He has no cervical adenopathy.   Neurological: He is alert and oriented to person, place, and time.   Psychiatric: He has a normal mood and affect. Thought content normal.       Recent Results (from the past 672 hour(s))   PSA DIAGNOSTIC ONLY    Collection Time: 08/06/20  9:59 AM   Result Value Ref Range    PSA 0.958 0.000 - 4.000 ng/mL   T4, free    Collection Time: 08/06/20  9:59 AM   Result Value Ref Range    Free T4 1.54 0.93 - 1.70 ng/dL   TSH    Collection Time: 08/06/20  9:59 AM   Result Value Ref Range    TSH 1.770 0.270 - 4.200 uIU/mL   Lipid Panel With / Chol / HDL Ratio    Collection Time: 08/06/20  9:59 AM   Result Value Ref Range    Total Cholesterol 186 0 - 200 mg/dL    Triglycerides 406 (H) 0 - 150 mg/dL    HDL Cholesterol 45 40 - 60 mg/dL    VLDL Cholesterol CANCELED mg/dL    LDL Cholesterol  CANCELED mg/dL    Chol/HDL Ratio 4.13    Comprehensive metabolic panel    Collection Time: 08/06/20  9:59 AM   Result Value Ref Range    Glucose 124 (H) 65 - 99 mg/dL    BUN 11 6 - 20 mg/dL    Creatinine 1.06 0.76 - 1.27 mg/dL    eGFR Non African Am 72 >60 mL/min/1.73    eGFR "  Am 87 >60 mL/min/1.73    BUN/Creatinine Ratio 10.4 7.0 - 25.0    Sodium 139 136 - 145 mmol/L    Potassium 4.8 3.5 - 5.2 mmol/L    Chloride 99 98 - 107 mmol/L    Total CO2 27.5 22.0 - 29.0 mmol/L    Calcium 9.4 8.6 - 10.5 mg/dL    Total Protein 6.2 6.0 - 8.5 g/dL    Albumin 4.60 3.50 - 5.20 g/dL    Globulin 1.6 gm/dL    A/G Ratio 2.9 g/dL    Total Bilirubin 0.7 0.0 - 1.2 mg/dL    Alkaline Phosphatase 56 39 - 117 U/L    AST (SGOT) 28 1 - 40 U/L    ALT (SGPT) 27 1 - 41 U/L   CBC & Differential    Collection Time: 08/06/20  9:59 AM   Result Value Ref Range    WBC 6.35 3.40 - 10.80 10*3/mm3    RBC 5.12 4.14 - 5.80 10*6/mm3    Hemoglobin 16.1 13.0 - 17.7 g/dL    Hematocrit 48.8 37.5 - 51.0 %    MCV 95.3 79.0 - 97.0 fL    MCH 31.4 26.6 - 33.0 pg    MCHC 33.0 31.5 - 35.7 g/dL    RDW 13.7 12.3 - 15.4 %    Platelets 260 140 - 450 10*3/mm3    Neutrophil Rel % 59.8 42.7 - 76.0 %    Lymphocyte Rel % 24.6 19.6 - 45.3 %    Monocyte Rel % 11.8 5.0 - 12.0 %    Eosinophil Rel % 2.5 0.3 - 6.2 %    Basophil Rel % 0.8 0.0 - 1.5 %    Neutrophils Absolute 3.80 1.70 - 7.00 10*3/mm3    Lymphocytes Absolute 1.56 0.70 - 3.10 10*3/mm3    Monocytes Absolute 0.75 0.10 - 0.90 10*3/mm3    Eosinophils Absolute 0.16 0.00 - 0.40 10*3/mm3    Basophils Absolute 0.05 0.00 - 0.20 10*3/mm3    Immature Granulocyte Rel % 0.5 0.0 - 0.5 %    Immature Grans Absolute 0.03 0.00 - 0.05 10*3/mm3    nRBC 0.0 0.0 - 0.2 /100 WBC   Hemoglobin A1c    Collection Time: 08/06/20  9:59 AM   Result Value Ref Range    Hemoglobin A1C 5.90 (H) 4.80 - 5.60 %         Assessment/Plan   Diagnoses and all orders for this visit:    1. Hypertension, essential (Primary)    2. Hyperlipidemia, unspecified hyperlipidemia type  -     omega-3 acid ethyl esters (Lovaza) 1 g capsule; Take 2 capsules by mouth 2 (Two) Times a Day.  Dispense: 360 capsule; Refill: 3    3. Type 2 diabetes mellitus without complication, without long-term current use of insulin (CMS/[facility])    4. Moderate  episode of recurrent major depressive disorder (CMS/HCC)    5. Sleep pattern disturbance        Lab results noted above discussed with patient if applicable.  HTN is well controlled - continue same  HLD is uncontrolled - continue statin, add Lovaza if covered by ins  DM is well controlled - continue same  MDD improved  Sleep improved.    Except as noted above, pt will continue current medications as noted in the medication list.    Patient counseled regarding therapeutic lifestyle changes including improved nutrition, increased exercise, and weight loss.        Medications, including side effects, were discussed with the patient. Patient verbalized understanding.  The plan of care was discussed. All questions were answered. Patient verbalized understanding.      Return in about 4 months (around 12/13/2020).

## 2020-08-18 ENCOUNTER — RESULTS ENCOUNTER (OUTPATIENT)
Dept: INTERNAL MEDICINE | Facility: CLINIC | Age: 60
End: 2020-08-18

## 2020-08-18 DIAGNOSIS — E78.5 HYPERLIPIDEMIA, UNSPECIFIED HYPERLIPIDEMIA TYPE: ICD-10-CM

## 2020-08-18 DIAGNOSIS — E11.9 TYPE 2 DIABETES MELLITUS WITHOUT COMPLICATION, WITHOUT LONG-TERM CURRENT USE OF INSULIN (HCC): ICD-10-CM

## 2020-08-18 DIAGNOSIS — I10 HYPERTENSION, ESSENTIAL: ICD-10-CM

## 2020-09-19 DIAGNOSIS — N52.2 DRUG-INDUCED ERECTILE DYSFUNCTION: ICD-10-CM

## 2020-09-21 RX ORDER — TADALAFIL 5 MG/1
TABLET ORAL
Qty: 10 TABLET | Refills: 0 | Status: SHIPPED | OUTPATIENT
Start: 2020-09-21 | End: 2020-10-01

## 2020-09-30 DIAGNOSIS — N52.2 DRUG-INDUCED ERECTILE DYSFUNCTION: ICD-10-CM

## 2020-10-01 RX ORDER — TADALAFIL 5 MG/1
TABLET ORAL
Qty: 10 TABLET | Refills: 0 | Status: SHIPPED | OUTPATIENT
Start: 2020-10-01 | End: 2020-10-16

## 2020-10-02 DIAGNOSIS — N52.2 DRUG-INDUCED ERECTILE DYSFUNCTION: ICD-10-CM

## 2020-10-05 DIAGNOSIS — N52.2 DRUG-INDUCED ERECTILE DYSFUNCTION: ICD-10-CM

## 2020-10-05 RX ORDER — TADALAFIL 5 MG/1
TABLET ORAL
Qty: 10 TABLET | Refills: 0 | OUTPATIENT
Start: 2020-10-05

## 2020-10-05 NOTE — TELEPHONE ENCOUNTER
PATIENT DOES NOT NEED A SCRIPT FOR THE TADALAFIL. HE ONLY NEEDS THE CARVEDILOL.     PATIENT CALLBACK [phone]

## 2020-10-05 NOTE — TELEPHONE ENCOUNTER
RF was approved 4 days ago. Please call pharm and figure out why they are requesting another RF again so soon.

## 2020-10-06 RX ORDER — CARVEDILOL 3.12 MG/1
TABLET ORAL
Qty: 180 TABLET | Refills: 3 | Status: SHIPPED | OUTPATIENT
Start: 2020-10-06 | End: 2021-01-25 | Stop reason: SDUPTHER

## 2020-10-06 RX ORDER — TADALAFIL 5 MG/1
TABLET ORAL
Qty: 10 TABLET | Refills: 0 | OUTPATIENT
Start: 2020-10-06

## 2020-10-15 DIAGNOSIS — N52.2 DRUG-INDUCED ERECTILE DYSFUNCTION: ICD-10-CM

## 2020-10-16 RX ORDER — TADALAFIL 5 MG/1
TABLET ORAL
Qty: 10 TABLET | Refills: 0 | Status: SHIPPED | OUTPATIENT
Start: 2020-10-16 | End: 2020-10-30

## 2020-10-26 DIAGNOSIS — N52.2 DRUG-INDUCED ERECTILE DYSFUNCTION: ICD-10-CM

## 2020-10-28 NOTE — TELEPHONE ENCOUNTER
Patient is completely out of medication. He called to check the status of his refill.     Please call patient when it has been sent in.     PHARMACY CONFIRMED: KEREN LARRY [address] [place] [address] [address] [facility] - [phone]  - [phone]   [phone]

## 2020-10-30 RX ORDER — TADALAFIL 5 MG/1
TABLET ORAL
Qty: 30 TABLET | Refills: 1 | Status: SHIPPED | OUTPATIENT
Start: 2020-10-30 | End: 2021-01-11

## 2020-10-30 NOTE — TELEPHONE ENCOUNTER
PT CALLED BACK TO CHECK ON THIS REFILL. HE HAS NOT HEARD ANYTHING BACK, AND HE STATES HE CHECKED WITH HIS PHARMACY AND THEY HAVE NOT RECEIVED ANYTHING.    IF THERE IS A HOLD UP, PLEASE CALL PT TO EXPLAIN.    HE CAN BE REACHED [phone]

## 2020-12-03 LAB
ALBUMIN SERPL-MCNC: 4.3 G/DL (ref 3.5–5.2)
ALBUMIN/GLOB SERPL: 2.4 G/DL
ALP SERPL-CCNC: 68 U/L (ref 39–117)
ALT SERPL-CCNC: 18 U/L (ref 1–41)
AST SERPL-CCNC: 15 U/L (ref 1–40)
BILIRUB SERPL-MCNC: 0.3 MG/DL (ref 0–1.2)
BUN SERPL-MCNC: 15 MG/DL (ref 8–23)
BUN/CREAT SERPL: 13.8 (ref 7–25)
CALCIUM SERPL-MCNC: 9.2 MG/DL (ref 8.6–10.5)
CHLORIDE SERPL-SCNC: 96 MMOL/L (ref 98–107)
CHOLEST SERPL-MCNC: 151 MG/DL (ref 0–200)
CHOLEST/HDLC SERPL: 2.65 {RATIO}
CO2 SERPL-SCNC: 30.8 MMOL/L (ref 22–29)
CREAT SERPL-MCNC: 1.09 MG/DL (ref 0.76–1.27)
GLOBULIN SER CALC-MCNC: 1.8 GM/DL
GLUCOSE SERPL-MCNC: 131 MG/DL (ref 65–99)
HBA1C MFR BLD: 6.2 % (ref 4.8–5.6)
HDLC SERPL-MCNC: 57 MG/DL (ref 40–60)
LDLC SERPL CALC-MCNC: 74 MG/DL (ref 0–100)
POTASSIUM SERPL-SCNC: 5.1 MMOL/L (ref 3.5–5.2)
PROT SERPL-MCNC: 6.1 G/DL (ref 6–8.5)
SODIUM SERPL-SCNC: 135 MMOL/L (ref 136–145)
TRIGL SERPL-MCNC: 109 MG/DL (ref 0–150)
VLDLC SERPL CALC-MCNC: 20 MG/DL (ref 5–40)

## 2021-01-07 ENCOUNTER — OFFICE VISIT (OUTPATIENT)
Dept: INTERNAL MEDICINE | Facility: CLINIC | Age: 61
End: 2021-01-07

## 2021-01-07 VITALS
RESPIRATION RATE: 16 BRPM | SYSTOLIC BLOOD PRESSURE: 100 MMHG | WEIGHT: 209 LBS | HEART RATE: 112 BPM | HEIGHT: 73 IN | TEMPERATURE: 97.8 F | DIASTOLIC BLOOD PRESSURE: 80 MMHG | OXYGEN SATURATION: 97 % | BODY MASS INDEX: 27.7 KG/M2

## 2021-01-07 DIAGNOSIS — E78.2 MIXED HYPERLIPIDEMIA: ICD-10-CM

## 2021-01-07 DIAGNOSIS — Z12.5 SCREENING FOR MALIGNANT NEOPLASM OF PROSTATE: ICD-10-CM

## 2021-01-07 DIAGNOSIS — E11.9 TYPE 2 DIABETES MELLITUS WITHOUT COMPLICATION, WITHOUT LONG-TERM CURRENT USE OF INSULIN (HCC): ICD-10-CM

## 2021-01-07 DIAGNOSIS — I10 ESSENTIAL HYPERTENSION: Primary | ICD-10-CM

## 2021-01-07 DIAGNOSIS — H00.025 HORDEOLUM INTERNUM OF LEFT LOWER EYELID: ICD-10-CM

## 2021-01-07 DIAGNOSIS — G47.20 SLEEP PATTERN DISTURBANCE: ICD-10-CM

## 2021-01-07 DIAGNOSIS — F33.1 MODERATE EPISODE OF RECURRENT MAJOR DEPRESSIVE DISORDER (HCC): ICD-10-CM

## 2021-01-07 PROBLEM — Z95.5 S/P CORONARY ARTERY STENT PLACEMENT: Chronic | Status: ACTIVE | Noted: 2017-04-27

## 2021-01-07 PROBLEM — E78.5 HLD (HYPERLIPIDEMIA): Chronic | Status: ACTIVE | Noted: 2017-04-27

## 2021-01-07 PROBLEM — I25.10 CORONARY ARTERY DISEASE INVOLVING NATIVE CORONARY ARTERY: Chronic | Status: ACTIVE | Noted: 2017-04-27

## 2021-01-07 PROBLEM — J30.9 ALLERGIC RHINITIS: Chronic | Status: ACTIVE | Noted: 2017-10-27

## 2021-01-07 PROBLEM — I25.2 PERSONAL HISTORY OF MI (MYOCARDIAL INFARCTION): Chronic | Status: ACTIVE | Noted: 2017-04-27

## 2021-01-07 PROCEDURE — 99214 OFFICE O/P EST MOD 30 MIN: CPT | Performed by: NURSE PRACTITIONER

## 2021-01-07 NOTE — PROGRESS NOTES
Subjective   Seth Manuel is a 60 y.o. male presenting today for follow up of   Chief Complaint   Patient presents with   • Follow-up   • Hypertension       History of Present Illness     Patient Active Problem List   Diagnosis   • Coronary artery disease involving native coronary artery   • Type 2 diabetes mellitus (CMS/HCC)   • Essential hypertension   • AMIRA (obstructive sleep apnea)   • Drug-induced erectile dysfunction   • S/P coronary artery stent placement   • HLD (hyperlipidemia)   • Osteoarthritis of both knees   • Rupture of anterior cruciate ligament of left knee   • Personal history of MI (myocardial infarction)   • Ischemic dilated cardiomyopathy (CMS/HCC)   • Allergic rhinitis   • Moderate episode of recurrent major depressive disorder (CMS/HCC)   • Sleep pattern disturbance       Current Outpatient Medications on File Prior to Visit   Medication Sig   • Ascorbic Acid (VITAMIN C PO) Take 3,000 Units by mouth.   • aspirin 81 MG EC tablet Take 81 mg by mouth Daily.   • carvedilol (COREG) 3.125 MG tablet TAKE ONE TABLET BY MOUTH TWICE A DAY WITH MEALS   • Coenzyme Q10 (COQ10 PO) Take 250 mg by mouth 2 (Two) Times a Day.   • DHEA 50 MG tablet Take 1 tablet by mouth Daily.   • famotidine (PEPCID) 40 MG tablet TAKE 1 TABLET DAILY   • KRILL OIL PO Take  by mouth.   • LIVALO 2 MG tablet tablet TAKE 1 TABLET EVERY NIGHT   • melatonin 3 MG tablet Take 3 mg by mouth Every Night.   • metFORMIN (GLUCOPHAGE) 500 MG tablet Take 1 tablet by mouth 2 (Two) Times a Day.   • montelukast (SINGULAIR) 10 MG tablet TAKE 1 TABLET EVERY NIGHT   • omega-3 acid ethyl esters (Lovaza) 1 g capsule Take 2 capsules by mouth 2 (Two) Times a Day.   • sertraline (ZOLOFT) 50 MG tablet TAKE 1 TABLET DAILY   • tadalafil (CIALIS) 5 MG tablet TAKE ONE TABLET BY MOUTH DAILY   • traZODone (DESYREL) 50 MG tablet TAKE 1 TO 2 TABLETS AT BEDTIME     No current facility-administered medications on file prior to visit.       Mr. Manuel has HTN, HLD,  "and DM. He reports his is doing well. He is tolerating his current medication regimen w/o SEs. We added Lovaza at his last f/u to attempt better trig control. He has also added Krill oil.    He is attempting to eat paleo/med. He is exercising =  Walking 10-15K steps at work    With regards to his depression, he is no longer meeting with a counselor d/t ins issues. He continues to take Zoloft and is doing well with this.    Trazodone continues to control his insomnia.      He c/o a FB sensation in his L eye that began 2 days ago. He sts \"It feels like I still have a contact in my eye.\" He notes it is gradually improving.      The following portions of the patient's history were reviewed and updated as appropriate: allergies, current medications, past family history, past medical history, past social history, past surgical history and problem list.    Review of Systems   Eyes: Negative for discharge and visual disturbance.        Eye irritation   Respiratory: Negative for shortness of breath.    Cardiovascular: Negative for chest pain.   Neurological: Negative for dizziness and headache.   All other systems reviewed and are negative.      Objective   Vitals:    01/07/21 0803   BP: 100/80   BP Location: Left arm   Patient Position: Sitting   Cuff Size: Large Adult   Pulse: 112   Resp: 16   Temp: 97.8 °F (36.6 °C)   TempSrc: Temporal   SpO2: 97%   Weight: 94.8 kg (209 lb)   Height: 185.4 cm (72.99\")     Body mass index is 27.58 kg/m².  Nursing notes and vital reviewed.    Physical Exam  Constitutional:       General: He is not in acute distress.     Appearance: He is well-developed.   Eyes:      General:         Left eye: Hordeolum present.     Extraocular Movements: Extraocular movements intact.      Conjunctiva/sclera:      Left eye: Left conjunctiva is injected. No exudate.     Comments: Negative fluro   Cardiovascular:      Rate and Rhythm: Regular rhythm.      Heart sounds: Normal heart sounds.   Pulmonary:      " Effort: Pulmonary effort is normal.      Breath sounds: Normal breath sounds.   Neurological:      Mental Status: He is alert and oriented to person, place, and time.   Psychiatric:         Attention and Perception: He is attentive.         Mood and Affect: Mood and affect normal.         Speech: Speech normal.         Behavior: Behavior normal.         Thought Content: Thought content normal.         Recent Results (from the past 2016 hour(s))   Comprehensive Metabolic Panel    Collection Time: 12/03/20  9:10 AM    Specimen: Blood   Result Value Ref Range    Glucose 131 (H) 65 - 99 mg/dL    BUN 15 8 - 23 mg/dL    Creatinine 1.09 0.76 - 1.27 mg/dL    eGFR Non African Am 69 >60 mL/min/1.73    eGFR African Am 84 >60 mL/min/1.73    BUN/Creatinine Ratio 13.8 7.0 - 25.0    Sodium 135 (L) 136 - 145 mmol/L    Potassium 5.1 3.5 - 5.2 mmol/L    Chloride 96 (L) 98 - 107 mmol/L    Total CO2 30.8 (H) 22.0 - 29.0 mmol/L    Calcium 9.2 8.6 - 10.5 mg/dL    Total Protein 6.1 6.0 - 8.5 g/dL    Albumin 4.30 3.50 - 5.20 g/dL    Globulin 1.8 gm/dL    A/G Ratio 2.4 g/dL    Total Bilirubin 0.3 0.0 - 1.2 mg/dL    Alkaline Phosphatase 68 39 - 117 U/L    AST (SGOT) 15 1 - 40 U/L    ALT (SGPT) 18 1 - 41 U/L   Hemoglobin A1c    Collection Time: 12/03/20  9:10 AM    Specimen: Blood   Result Value Ref Range    Hemoglobin A1C 6.20 (H) 4.80 - 5.60 %   Lipid Panel With / Chol / HDL Ratio    Collection Time: 12/03/20  9:10 AM    Specimen: Blood   Result Value Ref Range    Total Cholesterol 151 0 - 200 mg/dL    Triglycerides 109 0 - 150 mg/dL    HDL Cholesterol 57 40 - 60 mg/dL    VLDL Cholesterol Juan Antonio 20 5 - 40 mg/dL    LDL Chol Calc (NIH) 74 0 - 100 mg/dL    Chol/HDL Ratio 2.65      Each of these lab results were discussed individually in detail with the patient.      Assessment/Plan   Diagnoses and all orders for this visit:    1. Essential hypertension (Primary)  Comments:  - controlled  Orders:  -     CBC (No Diff); Future  -     Comprehensive  Metabolic Panel; Future  -     Thyroid Cascade Profile; Future    2. Mixed hyperlipidemia  Comments:  - much improved w/ addition of Lovaza  Orders:  -     Comprehensive Metabolic Panel; Future  -     Lipid Panel With / Chol / HDL Ratio; Future    3. Type 2 diabetes mellitus without complication, without long-term current use of insulin (CMS/[facility])  Comments:  - A1c 6.2 but increased from last visit  - I would really like him to cut back on sugar intake  Orders:  -     Comprehensive Metabolic Panel; Future  -     Hemoglobin A1c; Future    4. Moderate episode of recurrent major depressive disorder (CMS/[facility])  Comments:  - controlled    5. Sleep pattern disturbance  Comments:  - controlled    6. Hordeolum internum of left lower eyelid  Comments:  - warm compresses    7. Screening for malignant neoplasm of prostate  -     PSA Screen; Future        Except as noted above, pt will continue current medications as noted in the medication list. I will continue to authorize refills as needed.    Patient counseled regarding therapeutic lifestyle changes including improved nutrition, increased exercise, and weight loss.      Medications, including side effects, were discussed with the patient. Patient verbalized understanding.  The plan of care was discussed. All questions were answered. Patient verbalized understanding.      Return in about 6 months (around 7/7/2021) for Annual physical.

## 2021-01-11 DIAGNOSIS — N52.2 DRUG-INDUCED ERECTILE DYSFUNCTION: ICD-10-CM

## 2021-01-11 RX ORDER — TADALAFIL 5 MG/1
TABLET ORAL
Qty: 10 TABLET | Refills: 0 | Status: SHIPPED | OUTPATIENT
Start: 2021-01-11

## 2021-01-25 RX ORDER — CARVEDILOL 3.12 MG/1
3.12 TABLET ORAL 2 TIMES DAILY WITH MEALS
Qty: 180 TABLET | Refills: 3 | Status: SHIPPED | OUTPATIENT
Start: 2021-01-25 | End: 2022-01-26 | Stop reason: SDUPTHER

## 2021-03-08 RX ORDER — PITAVASTATIN CALCIUM 2.09 MG/1
TABLET, FILM COATED ORAL
Qty: 90 TABLET | Refills: 3 | Status: SHIPPED | OUTPATIENT
Start: 2021-03-08 | End: 2022-03-03

## 2021-04-25 RX ORDER — MONTELUKAST SODIUM 10 MG/1
TABLET ORAL
Qty: 90 TABLET | Refills: 3 | Status: SHIPPED | OUTPATIENT
Start: 2021-04-25 | End: 2022-03-21

## 2021-05-01 DIAGNOSIS — E11.9 TYPE 2 DIABETES MELLITUS WITHOUT COMPLICATION, WITHOUT LONG-TERM CURRENT USE OF INSULIN (HCC): ICD-10-CM

## 2021-05-17 RX ORDER — FAMOTIDINE 40 MG/1
TABLET, FILM COATED ORAL
Qty: 90 TABLET | Refills: 3 | Status: SHIPPED | OUTPATIENT
Start: 2021-05-17 | End: 2022-03-21

## 2021-06-08 DIAGNOSIS — F33.1 MODERATE EPISODE OF RECURRENT MAJOR DEPRESSIVE DISORDER (HCC): ICD-10-CM

## 2021-06-08 RX ORDER — TRAZODONE HYDROCHLORIDE 50 MG/1
TABLET ORAL
Qty: 180 TABLET | Refills: 3 | Status: SHIPPED | OUTPATIENT
Start: 2021-06-08 | End: 2022-01-10 | Stop reason: SDUPTHER

## 2021-07-01 DIAGNOSIS — F33.1 MODERATE EPISODE OF RECURRENT MAJOR DEPRESSIVE DISORDER (HCC): ICD-10-CM

## 2021-07-21 DIAGNOSIS — E78.5 HYPERLIPIDEMIA, UNSPECIFIED HYPERLIPIDEMIA TYPE: ICD-10-CM

## 2021-07-22 RX ORDER — OMEGA-3-ACID ETHYL ESTERS 1 G/1
CAPSULE, LIQUID FILLED ORAL
Qty: 360 CAPSULE | Refills: 3 | Status: SHIPPED | OUTPATIENT
Start: 2021-07-22 | End: 2022-01-10 | Stop reason: SDUPTHER

## 2021-08-31 ENCOUNTER — OFFICE VISIT (OUTPATIENT)
Dept: INTERNAL MEDICINE | Facility: CLINIC | Age: 61
End: 2021-08-31

## 2021-08-31 VITALS
HEART RATE: 95 BPM | SYSTOLIC BLOOD PRESSURE: 114 MMHG | DIASTOLIC BLOOD PRESSURE: 82 MMHG | HEIGHT: 73 IN | TEMPERATURE: 97 F | BODY MASS INDEX: 29.18 KG/M2 | WEIGHT: 220.2 LBS | OXYGEN SATURATION: 97 %

## 2021-08-31 DIAGNOSIS — G47.20 SLEEP PATTERN DISTURBANCE: Chronic | ICD-10-CM

## 2021-08-31 DIAGNOSIS — Z00.00 ENCOUNTER FOR WELLNESS EXAMINATION IN ADULT: Primary | ICD-10-CM

## 2021-08-31 DIAGNOSIS — Z12.11 SCREENING FOR MALIGNANT NEOPLASM OF COLON: ICD-10-CM

## 2021-08-31 DIAGNOSIS — F33.1 MODERATE EPISODE OF RECURRENT MAJOR DEPRESSIVE DISORDER (HCC): Chronic | ICD-10-CM

## 2021-08-31 DIAGNOSIS — E11.9 TYPE 2 DIABETES MELLITUS WITHOUT COMPLICATION, WITHOUT LONG-TERM CURRENT USE OF INSULIN (HCC): Chronic | ICD-10-CM

## 2021-08-31 DIAGNOSIS — I10 ESSENTIAL HYPERTENSION: Chronic | ICD-10-CM

## 2021-08-31 DIAGNOSIS — E78.2 MIXED HYPERLIPIDEMIA: Chronic | ICD-10-CM

## 2021-08-31 LAB
BILIRUB BLD-MCNC: NEGATIVE MG/DL
CLARITY, POC: CLEAR
COLOR UR: NORMAL
GLUCOSE UR STRIP-MCNC: NEGATIVE MG/DL
KETONES UR QL: NEGATIVE
LEUKOCYTE EST, POC: NEGATIVE
NITRITE UR-MCNC: NEGATIVE MG/ML
PH UR: 5.5 [PH] (ref 5–8)
POC CREATININE URINE: NORMAL
POC MICROALBUMIN URINE: NORMAL
PROT UR STRIP-MCNC: NEGATIVE MG/DL
RBC # UR STRIP: NEGATIVE /UL
SP GR UR: 1.03 (ref 1–1.03)
UROBILINOGEN UR QL: NORMAL

## 2021-08-31 PROCEDURE — 82044 UR ALBUMIN SEMIQUANTITATIVE: CPT | Performed by: NURSE PRACTITIONER

## 2021-08-31 PROCEDURE — 93000 ELECTROCARDIOGRAM COMPLETE: CPT | Performed by: NURSE PRACTITIONER

## 2021-08-31 PROCEDURE — 81003 URINALYSIS AUTO W/O SCOPE: CPT | Performed by: NURSE PRACTITIONER

## 2021-08-31 PROCEDURE — 99396 PREV VISIT EST AGE 40-64: CPT | Performed by: NURSE PRACTITIONER

## 2021-08-31 NOTE — PROGRESS NOTES
DENNIS Ann is a 60 y.o. male presenting for Annual Exam, Follow-up, Hypertension, Hyperlipidemia, and Diabetes    His current/chronic health conditions include:  Patient Active Problem List   Diagnosis   • Coronary artery disease involving native coronary artery   • Type 2 diabetes mellitus (CMS/HCC)   • Essential hypertension   • AMIRA (obstructive sleep apnea)   • Drug-induced erectile dysfunction   • S/P coronary artery stent placement   • HLD (hyperlipidemia)   • Osteoarthritis of both knees   • Rupture of anterior cruciate ligament of left knee   • Personal history of MI (myocardial infarction)   • Ischemic dilated cardiomyopathy (CMS/HCC)   • Allergic rhinitis   • Moderate episode of recurrent major depressive disorder (CMS/HCC)   • Sleep pattern disturbance       Current Outpatient Medications on File Prior to Visit   Medication Sig   • Ascorbic Acid (VITAMIN C PO) Take 3,000 Units by mouth.   • aspirin 81 MG EC tablet Take 81 mg by mouth Daily.   • carvedilol (COREG) 3.125 MG tablet Take 1 tablet by mouth 2 (Two) Times a Day With Meals.   • Coenzyme Q10 (COQ10 PO) Take 250 mg by mouth 2 (Two) Times a Day.   • DHEA 50 MG tablet Take 1 tablet by mouth Daily.   • famotidine (PEPCID) 40 MG tablet TAKE 1 TABLET DAILY   • KRILL OIL PO Take  by mouth.   • Livalo 2 MG tablet tablet TAKE 1 TABLET EVERY NIGHT   • melatonin 3 MG tablet Take 3 mg by mouth Every Night.   • metFORMIN (GLUCOPHAGE) 500 MG tablet TAKE 1 TABLET TWICE A DAY   • montelukast (SINGULAIR) 10 MG tablet TAKE 1 TABLET EVERY NIGHT   • omega-3 acid ethyl esters (LOVAZA) 1 g capsule TAKE 2 CAPSULES TWICE A DAY   • sertraline (ZOLOFT) 50 MG tablet TAKE 1 TABLET DAILY   • tadalafil (CIALIS) 5 MG tablet TAKE ONE TABLET BY MOUTH DAILY   • traZODone (DESYREL) 50 MG tablet TAKE 1 TO 2 TABLETS AT BEDTIME     No current facility-administered medications on file prior to visit.        Mr. Manuel has HTN, HLD, and DM. He reports his is doing well. He is  "tolerating his current medication regimen w/o SEs.     With regards to his depression, he continues to take Zoloft and is doing well with this.     Trazodone continues to control his insomnia.      Health Habits:  Nutrition: \"I slipped off the wagon.\"  Exercise: 0 times/week.  Current exercise activities include: none    Screenings:  Eye Exam:past due; this is scheduled but he has been having trouble getting this scheduled w/ his provider  PSA: UTD  Colon Cancer:  Tob use: former        The patient's allergies, current medications, problem list, past medical history, past family history, past medical history, and past social history were reviewed and updated as appropriate.        OBJECTIVE    Vitals:    08/31/21 1132   BP: 114/82   Pulse: 95   Temp: 97 °F (36.1 °C)   SpO2: 97%   Weight: 99.9 kg (220 lb 3.2 oz)   Height: 185.4 cm (72.99\")       BP Readings from Last 3 Encounters:   08/31/21 114/82   01/07/21 100/80   08/13/20 122/80       Wt Readings from Last 3 Encounters:   08/31/21 99.9 kg (220 lb 3.2 oz)   01/07/21 94.8 kg (209 lb)   08/13/20 95.6 kg (210 lb 12.8 oz)       Body mass index is 29.06 kg/m².  Nursing notes and vital signs reviewed.    Physical Exam  Constitutional:       General: He is not in acute distress.     Appearance: Normal appearance. He is well-developed.   HENT:      Head: Normocephalic.      Right Ear: Hearing, tympanic membrane, ear canal and external ear normal.      Left Ear: Hearing, tympanic membrane, ear canal and external ear normal.      Nose: Nose normal. No mucosal edema or rhinorrhea.      Mouth/Throat:      Mouth: Mucous membranes are moist.      Pharynx: Oropharynx is clear. Uvula midline.   Eyes:      General: Lids are normal.      Extraocular Movements: Extraocular movements intact.      Conjunctiva/sclera: Conjunctivae normal.      Pupils: Pupils are equal, round, and reactive to light.   Neck:      Thyroid: No thyroid mass or thyromegaly.   Cardiovascular:      Rate and " Rhythm: Regular rhythm.      Pulses: Normal pulses.           Dorsalis pedis pulses are 2+ on the right side and 2+ on the left side.        Posterior tibial pulses are 2+ on the right side and 2+ on the left side.      Heart sounds: S1 normal and S2 normal. No murmur heard.   No friction rub. No gallop.    Pulmonary:      Effort: Pulmonary effort is normal.      Breath sounds: Normal breath sounds. No wheezing, rhonchi or rales.   Abdominal:      General: Bowel sounds are normal.      Palpations: Abdomen is soft.      Tenderness: There is no abdominal tenderness. There is no guarding.      Hernia: No hernia is present.   Musculoskeletal:         General: No deformity. Normal range of motion.      Cervical back: Normal range of motion and neck supple.      Right foot: Normal range of motion. No deformity.      Left foot: Normal range of motion. No deformity.   Feet:      Right foot:      Protective Sensation: 5 sites tested. 5 sites sensed.      Skin integrity: Skin integrity normal.      Toenail Condition: Right toenails are normal.      Left foot:      Protective Sensation: 5 sites tested. 5 sites sensed.      Skin integrity: Skin integrity normal.      Toenail Condition: Left toenails are normal.   Lymphadenopathy:      Cervical: No cervical adenopathy.   Skin:     General: Skin is warm and dry.      Findings: No lesion or rash.   Neurological:      General: No focal deficit present.      Mental Status: He is alert and oriented to person, place, and time.      Cranial Nerves: No cranial nerve deficit.      Sensory: No sensory deficit.      Motor: Motor function is intact.      Coordination: Coordination is intact.      Gait: Gait normal.      Deep Tendon Reflexes: Reflexes are normal and symmetric.   Psychiatric:         Attention and Perception: He is attentive.         Mood and Affect: Mood and affect normal.         Speech: Speech normal.         Behavior: Behavior normal.         Thought Content: Thought  content normal.           Recent Results (from the past 672 hour(s))   CBC (No Diff)    Collection Time: 08/24/21 11:11 AM    Specimen: Blood   Result Value Ref Range    WBC 7.95 3.40 - 10.80 10*3/mm3    RBC 5.58 4.14 - 5.80 10*6/mm3    Hemoglobin 16.9 13.0 - 17.7 g/dL    Hematocrit 52.0 (H) 37.5 - 51.0 %    MCV 93.2 79.0 - 97.0 fL    MCH 30.3 26.6 - 33.0 pg    MCHC 32.5 31.5 - 35.7 g/dL    RDW 13.4 12.3 - 15.4 %    Platelets 258 140 - 450 10*3/mm3   Comprehensive Metabolic Panel    Collection Time: 08/24/21 11:11 AM    Specimen: Blood   Result Value Ref Range    Glucose 107 (H) 65 - 99 mg/dL    BUN 17 8 - 23 mg/dL    Creatinine 1.09 0.76 - 1.27 mg/dL    eGFR Non African Am 69 >60 mL/min/1.73    eGFR African Am 84 >60 mL/min/1.73    BUN/Creatinine Ratio 15.6 7.0 - 25.0    Sodium 140 136 - 145 mmol/L    Potassium 4.8 3.5 - 5.2 mmol/L    Chloride 99 98 - 107 mmol/L    Total CO2 28.2 22.0 - 29.0 mmol/L    Calcium 9.8 8.6 - 10.5 mg/dL    Total Protein 6.9 6.0 - 8.5 g/dL    Albumin 4.50 3.50 - 5.20 g/dL    Globulin 2.4 gm/dL    A/G Ratio 1.9 g/dL    Total Bilirubin 0.5 0.0 - 1.2 mg/dL    Alkaline Phosphatase 64 39 - 117 U/L    AST (SGOT) 29 1 - 40 U/L    ALT (SGPT) 26 1 - 41 U/L   Hemoglobin A1c    Collection Time: 08/24/21 11:11 AM    Specimen: Blood   Result Value Ref Range    Hemoglobin A1C 6.20 (H) 4.80 - 5.60 %   Lipid Panel With / Chol / HDL Ratio    Collection Time: 08/24/21 11:11 AM    Specimen: Blood   Result Value Ref Range    Total Cholesterol 182 0 - 200 mg/dL    Triglycerides 165 (H) 0 - 150 mg/dL    HDL Cholesterol 56 40 - 60 mg/dL    VLDL Cholesterol Juan Antonio 28 5 - 40 mg/dL    LDL Chol Calc (NIH) 98 0 - 100 mg/dL    Chol/HDL Ratio 3.25    PSA Screen    Collection Time: 08/24/21 11:11 AM    Specimen: Blood   Result Value Ref Range    PSA 0.981 0.000 - 4.000 ng/mL   Thyroid Cascade Profile    Collection Time: 08/24/21 11:11 AM    Specimen: Blood   Result Value Ref Range    TSH 2.350 0.450 - 4.500 uIU/mL        ECG 12 Lead    Date/Time: 8/31/2021 11:53 AM  Performed by: Joselyn Ross APRN  Authorized by: Joselyn Ross APRN   Comparison: compared with previous ECG   Similar to previous ECG    Clinical impression: abnormal EKG          ASSESSMENT AND PLAN    Diagnoses and all orders for this visit:    1. Encounter for wellness examination in adult (Primary)  Comments:  - declined Pneumovax, Shingles vaccines  - will think about Tdap   - will think about lung cancer screening    2. Essential hypertension  Comments:  - controlled  Orders:  -     Comprehensive Metabolic Panel; Future  -     Hemoglobin A1c; Future  -     Lipid Panel With / Chol / HDL Ratio; Future    3. Mixed hyperlipidemia  Comments:  - controlled  Orders:  -     Comprehensive Metabolic Panel; Future  -     Hemoglobin A1c; Future  -     Lipid Panel With / Chol / HDL Ratio; Future    4. Type 2 diabetes mellitus without complication, without long-term current use of insulin (CMS/HCC)  Comments:  - controlled  Orders:  -     Comprehensive Metabolic Panel; Future  -     Hemoglobin A1c; Future  -     Lipid Panel With / Chol / HDL Ratio; Future  -     POC Urinalysis Dipstick, Automated  -     POC Microalbumin    5. Moderate episode of recurrent major depressive disorder (CMS/HCC)  Comments:  - controlled    6. Sleep pattern disturbance  Comments:  - controlled    7. Screening for malignant neoplasm of colon  -     Cologuard - Stool, Per Rectum; Future    Other orders  -     ECG 12 Lead          Preventative counseling completed including relevant screenings, appropriate vaccinations, healthy nutrition, and appropriate physical activity.  Patient's Body mass index is 29.06 kg/m². indicating that he is overweight (BMI 25-29.9). Obesity-related health conditions include the following: hypertension, coronary heart disease, diabetes mellitus and dyslipidemias. Obesity is worsening. BMI is is above average; BMI management plan is completed. We  discussed low calorie, low carb based diet program, portion control and increasing exercise..      Except as noted above, pt will continue current medications as noted in the medication list. I will continue to authorize refills as needed.      Medications, including side effects, were discussed with the patient. Patient verbalized understanding.  The plan of care was discussed. All questions were answered. Patient verbalized understanding.        Return in about 6 months (around 2/28/2022) for HTN, HLD, DM; fasting labs one week prior., or sooner if needed.

## 2022-01-10 DIAGNOSIS — F33.1 MODERATE EPISODE OF RECURRENT MAJOR DEPRESSIVE DISORDER: ICD-10-CM

## 2022-01-10 DIAGNOSIS — E78.5 HYPERLIPIDEMIA, UNSPECIFIED HYPERLIPIDEMIA TYPE: ICD-10-CM

## 2022-01-10 RX ORDER — OMEGA-3-ACID ETHYL ESTERS 1 G/1
2 CAPSULE, LIQUID FILLED ORAL 2 TIMES DAILY
Qty: 120 CAPSULE | Refills: 0 | Status: SHIPPED | OUTPATIENT
Start: 2022-01-10 | End: 2022-06-24

## 2022-01-10 RX ORDER — TRAZODONE HYDROCHLORIDE 50 MG/1
50-100 TABLET ORAL NIGHTLY
Qty: 90 TABLET | Refills: 0 | Status: SHIPPED | OUTPATIENT
Start: 2022-01-10 | End: 2022-03-21

## 2022-01-10 NOTE — TELEPHONE ENCOUNTER
Caller: Seth Manuel    Relationship: Self    Best call back number:[phone] (H)  Requested Prescriptions:   Requested Prescriptions     Pending Prescriptions Disp Refills   • traZODone (DESYREL) 50 MG tablet 180 tablet 3     Sig: Take 1-2 tablets by mouth Every Night.   • omega-3 acid ethyl esters (LOVAZA) 1 g capsule 360 capsule 3     Sig: Take 2 capsules by mouth 2 (Two) Times a Day.        Pharmacy where request should be sent:  [facility] [address] [facility] - [phone] [facility] [phone]   [phone]    Additional details provided by patient: PATIENT IS ASKING FOR EMERGENCY REFILLS TO BE SENT TO THE PHARMACY LISTED ABOVE. PATIENT STATES HE IS SWITCHING FROM [facility] TO [facility], BUT HAS NOT GOT HIS ACCOUNT SWITCHED OVER YET.     Does the patient have less than a 3 day supply:  [x] Yes  [] No    Jayla Hobbs Rep   01/10/22 10:45 EST

## 2022-01-10 NOTE — TELEPHONE ENCOUNTER
Rx Refill Note  Requested Prescriptions     Pending Prescriptions Disp Refills   • traZODone (DESYREL) 50 MG tablet 180 tablet 3     Sig: Take 1-2 tablets by mouth Every Night.   • omega-3 acid ethyl esters (LOVAZA) 1 g capsule 360 capsule 3     Sig: Take 2 capsules by mouth 2 (Two) Times a Day.      Last office visit with prescribing clinician: 8/31/2021      Next office visit with prescribing clinician: 2/28/2022            Jacquelyn Patino  01/10/22, 13:31 EST

## 2022-01-13 ENCOUNTER — TELEPHONE (OUTPATIENT)
Dept: INTERNAL MEDICINE | Facility: CLINIC | Age: 62
End: 2022-01-13

## 2022-01-13 NOTE — TELEPHONE ENCOUNTER
PT IS WAITING FOR HIS COVID TEST TO COME BACK FROM [facility] (DONE YESTERDAY) COULD NOT LOCATION A SLOT FOR A MY CHART VIDEO FOR PT WOULD HAS COUGH and NASAL DRAINAGE.    PLEASE ADVISE

## 2022-01-13 NOTE — TELEPHONE ENCOUNTER
I don't understand this message. If pt had COVID testing at [facility], does not need to see me.

## 2022-01-14 NOTE — TELEPHONE ENCOUNTER
CALLED AND SPOKE WITH PATIENT AND ADVISED, THAT IF HIS ILLNESS IS VIRAL, THE PROVIDER WILL NOT PRESCRIBE AN ANTIBIOTIC FOR HIM. WE NEED TO WAIT TO SEE WHAT THE COVID TEST REFLECTS THAT HE HAD DONE AT THE . PT ADVISED THEY WERE DOING THINGS DIFFERENT IN [place] BUT HE UNDERSTANDS.

## 2022-01-20 ENCOUNTER — OFFICE VISIT (OUTPATIENT)
Dept: INTERNAL MEDICINE | Facility: CLINIC | Age: 62
End: 2022-01-20

## 2022-01-20 VITALS
SYSTOLIC BLOOD PRESSURE: 118 MMHG | RESPIRATION RATE: 20 BRPM | WEIGHT: 225.2 LBS | HEIGHT: 73 IN | BODY MASS INDEX: 29.85 KG/M2 | HEART RATE: 97 BPM | TEMPERATURE: 98.4 F | DIASTOLIC BLOOD PRESSURE: 66 MMHG | OXYGEN SATURATION: 95 %

## 2022-01-20 DIAGNOSIS — B97.89 VIRAL RESPIRATORY ILLNESS: Primary | ICD-10-CM

## 2022-01-20 DIAGNOSIS — J98.8 VIRAL RESPIRATORY ILLNESS: Primary | ICD-10-CM

## 2022-01-20 PROCEDURE — 99213 OFFICE O/P EST LOW 20 MIN: CPT | Performed by: NURSE PRACTITIONER

## 2022-01-20 NOTE — PROGRESS NOTES
"Seth Manuel is a 61 y.o. male presenting today for   Chief Complaint   Patient presents with   • URI       Subjective    History of Present Illness     He c/o chest congestion that began on 01/01/22. He presented to [facility] on 01/13 for COVID testing which was negative. Gradually improving since 01/16.  OTCs: Cinnamon honey tea, Coricidin    The following portions of the patient's history were reviewed and updated as appropriate: allergies, current medications, problem list, past medical history, past surgical history, family history, and social history.    Review of Systems   Constitutional: Negative for chills and fever.   HENT: Positive for congestion and rhinorrhea. Negative for ear pain and sore throat.    Respiratory: Positive for cough (mostly producitve but beginning to \"dry out\"). Negative for shortness of breath and wheezing.    Cardiovascular: Negative for chest pain.   Gastrointestinal: Negative for diarrhea, nausea and vomiting.   Neurological: Negative for dizziness and headache.         Objective    Vitals:    01/20/22 0842   BP: 118/66   Pulse: 97   Resp: 20   Temp: 98.4 °F (36.9 °C)   TempSrc: Temporal   SpO2: 95%   Weight: 102 kg (225 lb 3.2 oz)   Height: 185.4 cm (72.99\")     Body mass index is 29.72 kg/m².  Nursing notes and vitals reviewed.    Physical Exam  Constitutional:       General: He is not in acute distress.     Appearance: He is well-developed.   HENT:      Right Ear: Tympanic membrane, ear canal and external ear normal.      Left Ear: Tympanic membrane, ear canal and external ear normal.      Nose: Nose normal.      Mouth/Throat:      Mouth: Mucous membranes are moist.      Pharynx: Oropharynx is clear. Uvula midline.   Eyes:      Conjunctiva/sclera: Conjunctivae normal.   Neck:      Thyroid: No thyroid mass or thyromegaly.   Cardiovascular:      Rate and Rhythm: Regular rhythm.      Pulses: Normal pulses.      Heart sounds: S1 normal and S2 normal. No murmur heard.  No friction rub. No " gallop.    Pulmonary:      Effort: Pulmonary effort is normal.      Breath sounds: Normal breath sounds. No wheezing, rhonchi or rales.   Musculoskeletal:      Cervical back: Neck supple.   Lymphadenopathy:      Cervical: No cervical adenopathy.   Neurological:      Mental Status: He is alert and oriented to person, place, and time.   Psychiatric:         Attention and Perception: He is attentive.         Speech: Speech normal.         Behavior: Behavior normal.         Thought Content: Thought content normal.           Assessment and Plan    Diagnoses and all orders for this visit:    1. Viral respiratory illness (Primary)      Per pt's report, his S&S have begun to resolve. There is no evidence of secondary bacterial infection.  Cont w/ S&S OTCs.      Medications, including side effects, were discussed with the patient. Patient verbalized understanding.  The plan of care was discussed. All questions were answered. Patient verbalized understanding.        Return if symptoms worsen or fail to improve.

## 2022-01-26 DIAGNOSIS — I10 ESSENTIAL HYPERTENSION: Primary | ICD-10-CM

## 2022-01-26 RX ORDER — CARVEDILOL 3.12 MG/1
3.12 TABLET ORAL 2 TIMES DAILY WITH MEALS
Qty: 180 TABLET | Refills: 3 | Status: SHIPPED | OUTPATIENT
Start: 2022-01-26 | End: 2022-01-31 | Stop reason: SDUPTHER

## 2022-01-26 NOTE — TELEPHONE ENCOUNTER
Rx Refill Note  Requested Prescriptions     Pending Prescriptions Disp Refills   • carvedilol (COREG) 3.125 MG tablet 180 tablet 3     Sig: Take 1 tablet by mouth 2 (Two) Times a Day With Meals.      Last office visit with prescribing clinician: 1/20/2022      Next office visit with prescribing clinician: 2/28/2022            Aracelis Weaver, PCT  01/26/22, 09:53 EST

## 2022-01-31 DIAGNOSIS — I10 ESSENTIAL HYPERTENSION: ICD-10-CM

## 2022-01-31 RX ORDER — CARVEDILOL 3.12 MG/1
3.12 TABLET ORAL 2 TIMES DAILY WITH MEALS
Qty: 180 TABLET | Refills: 3 | Status: SHIPPED | OUTPATIENT
Start: 2022-01-31 | End: 2022-04-04 | Stop reason: SDUPTHER

## 2022-01-31 RX ORDER — CARVEDILOL 3.12 MG/1
TABLET ORAL
Qty: 180 TABLET | Refills: 3 | OUTPATIENT
Start: 2022-01-31

## 2022-01-31 NOTE — TELEPHONE ENCOUNTER
Rx Refill Note  Requested Prescriptions     Pending Prescriptions Disp Refills   • carvedilol (COREG) 3.125 MG tablet 180 tablet 3     Sig: Take 1 tablet by mouth 2 (Two) Times a Day With Meals.      Last office visit with prescribing clinician: 1/20/2022      Next office visit with prescribing clinician: 2/28/2022            Aracelis Weaver, PCT  01/31/22, 11:49 EST

## 2022-02-23 ENCOUNTER — LAB (OUTPATIENT)
Dept: INTERNAL MEDICINE | Facility: CLINIC | Age: 62
End: 2022-02-23

## 2022-02-23 DIAGNOSIS — E78.2 MIXED HYPERLIPIDEMIA: Chronic | ICD-10-CM

## 2022-02-23 DIAGNOSIS — E11.9 TYPE 2 DIABETES MELLITUS WITHOUT COMPLICATION, WITHOUT LONG-TERM CURRENT USE OF INSULIN: Chronic | ICD-10-CM

## 2022-02-23 DIAGNOSIS — I10 ESSENTIAL HYPERTENSION: Chronic | ICD-10-CM

## 2022-03-03 RX ORDER — PITAVASTATIN CALCIUM 2.09 MG/1
TABLET, FILM COATED ORAL
Qty: 90 TABLET | Refills: 1 | Status: SHIPPED | OUTPATIENT
Start: 2022-03-03 | End: 2022-07-25 | Stop reason: SDUPTHER

## 2022-03-03 NOTE — TELEPHONE ENCOUNTER
Rx Refill Note  Requested Prescriptions     Pending Prescriptions Disp Refills    Livalo 2 MG tablet tablet [Pharmacy Med Name: LIVALO TABS 2MG] 90 tablet 3     Sig: TAKE 1 TABLET EVERY NIGHT      Last office visit with prescribing clinician: 1/20/2022      Next office visit with prescribing clinician: Visit date not found            Argelia Iglesias MA  03/03/22, 07:58 EST

## 2022-03-20 DIAGNOSIS — F33.1 MODERATE EPISODE OF RECURRENT MAJOR DEPRESSIVE DISORDER: ICD-10-CM

## 2022-03-20 DIAGNOSIS — E11.9 TYPE 2 DIABETES MELLITUS WITHOUT COMPLICATION, WITHOUT LONG-TERM CURRENT USE OF INSULIN: ICD-10-CM

## 2022-03-21 RX ORDER — TRAZODONE HYDROCHLORIDE 50 MG/1
TABLET ORAL
Qty: 180 TABLET | Refills: 0 | Status: SHIPPED | OUTPATIENT
Start: 2022-03-21 | End: 2022-07-25

## 2022-03-21 RX ORDER — FAMOTIDINE 40 MG/1
TABLET, FILM COATED ORAL
Qty: 90 TABLET | Refills: 3 | Status: SHIPPED | OUTPATIENT
Start: 2022-03-21 | End: 2023-02-23

## 2022-03-21 RX ORDER — MONTELUKAST SODIUM 10 MG/1
TABLET ORAL
Qty: 90 TABLET | Refills: 0 | Status: SHIPPED | OUTPATIENT
Start: 2022-03-21 | End: 2022-07-25

## 2022-04-04 DIAGNOSIS — I10 ESSENTIAL HYPERTENSION: ICD-10-CM

## 2022-04-04 RX ORDER — CARVEDILOL 3.12 MG/1
3.12 TABLET ORAL 2 TIMES DAILY WITH MEALS
Qty: 60 TABLET | Refills: 0 | Status: SHIPPED | OUTPATIENT
Start: 2022-04-04 | End: 2022-05-16 | Stop reason: SDUPTHER

## 2022-04-04 NOTE — TELEPHONE ENCOUNTER
Caller: Seth Manuel    Relationship: Self    Best call back number: [phone]  Requested Prescriptions:   Requested Prescriptions     Pending Prescriptions Disp Refills   • carvedilol (COREG) 3.125 MG tablet 180 tablet 3     Sig: Take 1 tablet by mouth 2 (Two) Times a Day With Meals.        Pharmacy where request should be sent: [facility] [facility] 53 - [phone]  - [phone]     Additional details provided by patient: NO MEDICATION LEFT AND IS NEEDING ONE MONTH SENT IN TO LOCAL PHARMACY TO MAKE DUE UNTIL [facility] GETS REGULAR REQUEST APPROVED.    Does the patient have less than a 3 day supply:  [x] Yes  [] No    Jayla Pina Rep   04/04/22 14:59 EDT

## 2022-04-27 ENCOUNTER — OFFICE VISIT (OUTPATIENT)
Dept: CARDIOLOGY | Facility: CLINIC | Age: 62
End: 2022-04-27

## 2022-04-27 VITALS
HEART RATE: 95 BPM | SYSTOLIC BLOOD PRESSURE: 140 MMHG | BODY MASS INDEX: 31.83 KG/M2 | WEIGHT: 235 LBS | DIASTOLIC BLOOD PRESSURE: 78 MMHG | HEIGHT: 72 IN

## 2022-04-27 DIAGNOSIS — G47.33 OSA (OBSTRUCTIVE SLEEP APNEA): ICD-10-CM

## 2022-04-27 DIAGNOSIS — E78.2 MIXED HYPERLIPIDEMIA: Chronic | ICD-10-CM

## 2022-04-27 DIAGNOSIS — E11.9 TYPE 2 DIABETES MELLITUS WITHOUT COMPLICATION, WITHOUT LONG-TERM CURRENT USE OF INSULIN: Chronic | ICD-10-CM

## 2022-04-27 DIAGNOSIS — I42.0 ISCHEMIC DILATED CARDIOMYOPATHY: ICD-10-CM

## 2022-04-27 DIAGNOSIS — I25.2 PERSONAL HISTORY OF MI (MYOCARDIAL INFARCTION): Chronic | ICD-10-CM

## 2022-04-27 DIAGNOSIS — Z95.5 S/P CORONARY ARTERY STENT PLACEMENT: Chronic | ICD-10-CM

## 2022-04-27 DIAGNOSIS — I10 ESSENTIAL HYPERTENSION: Chronic | ICD-10-CM

## 2022-04-27 DIAGNOSIS — I25.5 ISCHEMIC DILATED CARDIOMYOPATHY: ICD-10-CM

## 2022-04-27 DIAGNOSIS — I25.10 CORONARY ARTERY DISEASE INVOLVING NATIVE CORONARY ARTERY OF NATIVE HEART WITHOUT ANGINA PECTORIS: Primary | Chronic | ICD-10-CM

## 2022-04-27 PROCEDURE — 93000 ELECTROCARDIOGRAM COMPLETE: CPT | Performed by: NURSE PRACTITIONER

## 2022-04-27 PROCEDURE — 99214 OFFICE O/P EST MOD 30 MIN: CPT | Performed by: NURSE PRACTITIONER

## 2022-04-27 NOTE — PROGRESS NOTES
Date of Office Visit: 2022  Encounter Provider: BREANNE Marcelino  Place of Service: [facility] CARDIOLOGY  Patient Name: Seth Manuel  :1960    Chief Complaint   Patient presents with   • Follow-up   • Coronary Artery Disease   :     HPI: Seth Manuel is a 61 y.o. male who is a patient of Dr. Yates and is new to me today.  He has a history of coronary disease with previous anterior ST elevation MI back in .  At that time he had an occluded LAD and had a 4.0 x 12 mm resolute drug-eluting stent placed.  He had a cardiomyopathy and wore a LifeVest for short period of time.  He also underwent cardiac rehab and his EF improved to 45%.  He also has essential hypertension and diabetes mellitus as well as hyperlipidemia.  He comes in for routine follow-up and is in need of surgical clearance.    He tore the tendon holding his bicep in his left arm at work recently.  He needs to have surgical intervention to repair this.  He is here for surgical clearance.  He denies any chest pain, pressure or tightness.  He writes activities without difficulty.  He quit smoking after his heart attack.  Previous testing and notes have been reviewed by me.   Past Medical History:   Diagnosis Date   • Allergic rhinitis 10/27/2017   • Arthritis    • Erectile dysfunction    • Hyperlipidemia    • Hypertension    • Myocardial infarction (HCC) 2014   • Sleep apnea        Past Surgical History:   Procedure Laterality Date   • CORONARY STENT PLACEMENT      Medtronic Resolute stent into LAD   • HERNIA REPAIR      Umbilical    • TONSILLECTOMY         Social History     Socioeconomic History   • Marital status:    Tobacco Use   • Smoking status: Former Smoker     Packs/day: 1.00     Years: 40.00     Pack years: 40.00     Types: Cigarettes     Start date:      Quit date:      Years since quittin.3   • Smokeless tobacco: Never Used   Substance and Sexual Activity   •  Alcohol use: Yes     Alcohol/week: 6.0 standard drinks     Types: 6 Cans of beer per week   • Drug use: No   • Sexual activity: Defer       Family History   Problem Relation Age of Onset   • Hypertension Mother    • Diabetes Mother    • Graves' disease Mother    • Rheum arthritis Mother    • Heart attack Mother 50   • Hypertension Father    • Cancer Father         Throat   • Stroke Father 76   • Heart attack Father 50   • Diabetes Brother    • Lung cancer Paternal Uncle    • Rheum arthritis Maternal Grandmother    • Heart disease Maternal Grandfather    • Diabetes Paternal Grandmother    • Heart disease Paternal Grandfather        Review of Systems   Constitutional: Negative for diaphoresis and malaise/fatigue.   Cardiovascular: Negative for chest pain, claudication, dyspnea on exertion, irregular heartbeat, leg swelling, near-syncope, orthopnea, palpitations, paroxysmal nocturnal dyspnea and syncope.   Respiratory: Negative for cough, shortness of breath and sleep disturbances due to breathing.    Musculoskeletal: Negative for falls.   Neurological: Negative for dizziness and weakness.   Psychiatric/Behavioral: Negative for altered mental status and substance abuse.       Allergies   Allergen Reactions   • Crestor [Rosuvastatin Calcium] Myalgia   • Lipitor [Atorvastatin] Myalgia         Current Outpatient Medications:   •  Ascorbic Acid (VITAMIN C PO), Take 3,000 Units by mouth., Disp: , Rfl:   •  aspirin 81 MG EC tablet, Take 81 mg by mouth Daily., Disp: , Rfl:   •  carvedilol (COREG) 3.125 MG tablet, Take 1 tablet by mouth 2 (Two) Times a Day With Meals., Disp: 60 tablet, Rfl: 0  •  Coenzyme Q10 (COQ10 PO), Take 250 mg by mouth 2 (Two) Times a Day., Disp: , Rfl:   •  DHEA 50 MG tablet, Take 1 tablet by mouth Daily., Disp: , Rfl:   •  famotidine (PEPCID) 40 MG tablet, TAKE 1 TABLET DAILY, Disp: 90 tablet, Rfl: 3  •  KRILL OIL PO, Take  by mouth., Disp: , Rfl:   •  Livalo 2 MG tablet tablet, TAKE 1 TABLET EVERY  "NIGHT, Disp: 90 tablet, Rfl: 1  •  melatonin 3 MG tablet, Take 3 mg by mouth Every Night., Disp: , Rfl:   •  metFORMIN (GLUCOPHAGE) 500 MG tablet, TAKE 1 TABLET TWICE A DAY, Disp: 180 tablet, Rfl: 0  •  montelukast (SINGULAIR) 10 MG tablet, TAKE 1 TABLET EVERY NIGHT, Disp: 90 tablet, Rfl: 0  •  omega-3 acid ethyl esters (LOVAZA) 1 g capsule, Take 2 capsules by mouth 2 (Two) Times a Day., Disp: 120 capsule, Rfl: 0  •  sertraline (ZOLOFT) 50 MG tablet, TAKE 1 TABLET DAILY, Disp: 90 tablet, Rfl: 3  •  tadalafil (CIALIS) 5 MG tablet, TAKE ONE TABLET BY MOUTH DAILY, Disp: 10 tablet, Rfl: 0  •  traZODone (DESYREL) 50 MG tablet, TAKE 1 TO 2 TABLETS AT  BEDTIME, Disp: 180 tablet, Rfl: 0      Objective:     Vitals:    04/27/22 1459   BP: 140/78   BP Location: Right arm   Patient Position: Sitting   Pulse: 95   Weight: 107 kg (235 lb)   Height: 182.9 cm (72\")     Body mass index is 31.87 kg/m².    PHYSICAL EXAM:    Constitutional:       General: Not in acute distress.     Appearance: Normal appearance. Well-developed.   Eyes:      Pupils: Pupils are equal, round, and reactive to light.   HENT:      Head: Normocephalic.   Neck:      Vascular: No carotid bruit or JVD.   Pulmonary:      Effort: Pulmonary effort is normal. No tachypnea.      Breath sounds: Normal breath sounds. No wheezing. No rales.   Cardiovascular:      Normal rate. Regular rhythm.      No gallop.   Pulses:     Intact distal pulses.   Abdominal:      General: Bowel sounds are normal.      Palpations: Abdomen is soft.      Tenderness: There is no abdominal tenderness.   Musculoskeletal: Normal range of motion.      Cervical back: Normal range of motion and neck supple. No edema. Skin:     General: Skin is warm and dry.   Neurological:      Mental Status: Alert and oriented to person, place, and time.           ECG 12 Lead    Date/Time: 4/27/2022 3:36 PM  Performed by: Irma Whitaker APRN  Authorized by: Irma Whitaker APRN   Comparison: compared with " previous ECG   Rhythm: sinus rhythm  Rate: normal  Q waves: II, III, aVF, V1, V2, V3, V4, V5 and V6    QRS axis: normal  Other findings: poor R wave progression    Clinical impression: abnormal EKG              Assessment:       Diagnosis Plan   1. Coronary artery disease involving native coronary artery of native heart without angina pectoris     2. Essential hypertension     3. Mixed hyperlipidemia     4. Ischemic dilated cardiomyopathy (HCC)     5. Personal history of MI (myocardial infarction)     6. S/P coronary artery stent placement     7. Type 2 diabetes mellitus without complication, without long-term current use of insulin (HCC)     8. AMIRA (obstructive sleep apnea)       No orders of the defined types were placed in this encounter.         Plan:       I am getting get an echocardiogram his last work-up was in 2014.  If stable I going to send a letter of clearance to his surgeon. Dr. Patrick Villa, [facility] Fax # [phone]. I discussed with him he should see is yearly.         Your medication list          Accurate as of April 27, 2022  3:19 PM. If you have any questions, ask your nurse or doctor.            CONTINUE taking these medications      Instructions Last Dose Given Next Dose Due   aspirin 81 MG EC tablet      Take 81 mg by mouth Daily.       carvedilol 3.125 MG tablet  Commonly known as: COREG      Take 1 tablet by mouth 2 (Two) Times a Day With Meals.       COQ10 PO      Take 250 mg by mouth 2 (Two) Times a Day.       DHEA 50 MG tablet      Take 1 tablet by mouth Daily.       famotidine 40 MG tablet  Commonly known as: PEPCID      TAKE 1 TABLET DAILY       KRILL OIL PO      Take  by mouth.       Livalo 2 MG tablet tablet  Generic drug: pitavastatin calcium      TAKE 1 TABLET EVERY NIGHT       melatonin 3 MG tablet      Take 3 mg by mouth Every Night.       metFORMIN 500 MG tablet  Commonly known as: GLUCOPHAGE      TAKE 1 TABLET TWICE A DAY       montelukast 10 MG tablet  Commonly known as:  SINGULAIR      TAKE 1 TABLET EVERY NIGHT       omega-3 acid ethyl esters 1 g capsule  Commonly known as: LOVAZA      Take 2 capsules by mouth 2 (Two) Times a Day.       sertraline 50 MG tablet  Commonly known as: ZOLOFT      TAKE 1 TABLET DAILY       tadalafil 5 MG tablet  Commonly known as: CIALIS      TAKE ONE TABLET BY MOUTH DAILY       traZODone 50 MG tablet  Commonly known as: DESYREL      TAKE 1 TO 2 TABLETS AT  BEDTIME       VITAMIN C PO      Take 3,000 Units by mouth.                As always, it has been a pleasure to participate in your patient's care.      Sincerely,     Irma RAYMUNDO

## 2022-04-28 ENCOUNTER — HOSPITAL ENCOUNTER (OUTPATIENT)
Dept: CARDIOLOGY | Facility: HOSPITAL | Age: 62
Discharge: HOME | End: 2022-04-28
Admitting: NURSE PRACTITIONER

## 2022-04-28 DIAGNOSIS — I25.5 ISCHEMIC DILATED CARDIOMYOPATHY: ICD-10-CM

## 2022-04-28 DIAGNOSIS — I42.0 ISCHEMIC DILATED CARDIOMYOPATHY: ICD-10-CM

## 2022-04-28 LAB
ASCENDING AORTA: 3.8 CM
BH CV ECHO LEFT VENTRICLE GLOBAL LONGITUDINAL STRAIN: -8.2 %
BH CV ECHO MEAS - ACS: 2.34 CM
BH CV ECHO MEAS - AO MAX PG: 5 MMHG
BH CV ECHO MEAS - AO MEAN PG: 2.9 MMHG
BH CV ECHO MEAS - AO ROOT DIAM: 4.1 CM
BH CV ECHO MEAS - AO V2 MAX: 112.2 CM/SEC
BH CV ECHO MEAS - AO V2 VTI: 22.9 CM
BH CV ECHO MEAS - AVA(I,D): 3.5 CM2
BH CV ECHO MEAS - EDV(CUBED): 270.1 ML
BH CV ECHO MEAS - EDV(MOD-SP2): 234 ML
BH CV ECHO MEAS - EDV(MOD-SP4): 273 ML
BH CV ECHO MEAS - EF(MOD-BP): 43.4 %
BH CV ECHO MEAS - EF(MOD-SP2): 40.6 %
BH CV ECHO MEAS - EF(MOD-SP4): 44.3 %
BH CV ECHO MEAS - ESV(CUBED): 77.9 ML
BH CV ECHO MEAS - ESV(MOD-SP2): 139 ML
BH CV ECHO MEAS - ESV(MOD-SP4): 152 ML
BH CV ECHO MEAS - FS: 33.9 %
BH CV ECHO MEAS - IVS/LVPW: 0.81 CM
BH CV ECHO MEAS - IVSD: 0.84 CM
BH CV ECHO MEAS - LAT PEAK E' VEL: 6.4 CM/SEC
BH CV ECHO MEAS - LV DIASTOLIC VOL/BSA (35-75): 119.7 CM2
BH CV ECHO MEAS - LV MASS(C)D: 258.4 GRAMS
BH CV ECHO MEAS - LV MAX PG: 3.6 MMHG
BH CV ECHO MEAS - LV MEAN PG: 2.29 MMHG
BH CV ECHO MEAS - LV SYSTOLIC VOL/BSA (12-30): 66.6 CM2
BH CV ECHO MEAS - LV V1 MAX: 94.7 CM/SEC
BH CV ECHO MEAS - LV V1 VTI: 16.4 CM
BH CV ECHO MEAS - LVIDD: 6.5 CM
BH CV ECHO MEAS - LVIDS: 4.3 CM
BH CV ECHO MEAS - LVOT AREA: 4.9 CM2
BH CV ECHO MEAS - LVOT DIAM: 2.5 CM
BH CV ECHO MEAS - LVPWD: 1.04 CM
BH CV ECHO MEAS - MED PEAK E' VEL: 6.6 CM/SEC
BH CV ECHO MEAS - MR MAX PG: 78.9 MMHG
BH CV ECHO MEAS - MR MAX VEL: 444 CM/SEC
BH CV ECHO MEAS - MV A DUR: 0.12 SEC
BH CV ECHO MEAS - MV A MAX VEL: 48.8 CM/SEC
BH CV ECHO MEAS - MV DEC SLOPE: 1002 CM/SEC2
BH CV ECHO MEAS - MV DEC TIME: 0.1 MSEC
BH CV ECHO MEAS - MV E MAX VEL: 74.6 CM/SEC
BH CV ECHO MEAS - MV E/A: 1.53
BH CV ECHO MEAS - MV MAX PG: 4.3 MMHG
BH CV ECHO MEAS - MV MEAN PG: 2.05 MMHG
BH CV ECHO MEAS - MV V2 VTI: 18.4 CM
BH CV ECHO MEAS - MVA(VTI): 4.4 CM2
BH CV ECHO MEAS - PA ACC TIME: 0.1 SEC
BH CV ECHO MEAS - PA PR(ACCEL): 36.2 MMHG
BH CV ECHO MEAS - PA V2 MAX: 78.2 CM/SEC
BH CV ECHO MEAS - PULM A REVS DUR: 0.1 SEC
BH CV ECHO MEAS - PULM A REVS VEL: 30.8 CM/SEC
BH CV ECHO MEAS - PULM DIAS VEL: 71.6 CM/SEC
BH CV ECHO MEAS - PULM SYS VEL: 76.2 CM/SEC
BH CV ECHO MEAS - RV MAX PG: 1.32 MMHG
BH CV ECHO MEAS - RV V1 MAX: 57.4 CM/SEC
BH CV ECHO MEAS - RV V1 VTI: 11 CM
BH CV ECHO MEAS - RVOT DIAM: 2.04 CM
BH CV ECHO MEAS - SI(MOD-SP2): 41.6 ML/M2
BH CV ECHO MEAS - SI(MOD-SP4): 53 ML/M2
BH CV ECHO MEAS - SUP REN AO DIAM: 2.3 CM
BH CV ECHO MEAS - SV(LVOT): 80.4 ML
BH CV ECHO MEAS - SV(MOD-SP2): 95 ML
BH CV ECHO MEAS - SV(MOD-SP4): 121 ML
BH CV ECHO MEAS - SV(RVOT): 36.1 ML
BH CV ECHO MEASUREMENTS AVERAGE E/E' RATIO: 11.48
BH CV XLRA - TDI S': 12.7 CM/SEC
LEFT ATRIUM VOLUME INDEX: 38.6 ML/M2
MAXIMAL PREDICTED HEART RATE: 159 BPM
SINUS: 4.5 CM
STJ: 3.5 CM
STRESS TARGET HR: 135 BPM

## 2022-04-28 PROCEDURE — 93306 TTE W/DOPPLER COMPLETE: CPT | Performed by: INTERNAL MEDICINE

## 2022-04-28 PROCEDURE — 25010000002 PERFLUTREN (DEFINITY) 8.476 MG IN SODIUM CHLORIDE (PF) 0.9 % 10 ML INJECTION: Performed by: NURSE PRACTITIONER

## 2022-04-28 PROCEDURE — 93306 TTE W/DOPPLER COMPLETE: CPT

## 2022-04-28 RX ADMIN — PERFLUTREN 1.5 ML: 6.52 INJECTION, SUSPENSION INTRAVENOUS at 13:39

## 2022-04-30 DIAGNOSIS — F33.1 MODERATE EPISODE OF RECURRENT MAJOR DEPRESSIVE DISORDER: ICD-10-CM

## 2022-05-03 ENCOUNTER — OFFICE VISIT (OUTPATIENT)
Dept: INTERNAL MEDICINE | Facility: CLINIC | Age: 62
End: 2022-05-03

## 2022-05-03 ENCOUNTER — LAB (OUTPATIENT)
Dept: LAB | Facility: HOSPITAL | Age: 62
End: 2022-05-03

## 2022-05-03 VITALS
DIASTOLIC BLOOD PRESSURE: 84 MMHG | BODY MASS INDEX: 31.42 KG/M2 | OXYGEN SATURATION: 98 % | HEIGHT: 72 IN | HEART RATE: 96 BPM | WEIGHT: 232 LBS | SYSTOLIC BLOOD PRESSURE: 130 MMHG

## 2022-05-03 DIAGNOSIS — I10 PRIMARY HYPERTENSION: ICD-10-CM

## 2022-05-03 DIAGNOSIS — Z01.818 PRE-OP EVALUATION: Primary | ICD-10-CM

## 2022-05-03 DIAGNOSIS — E11.9 TYPE 2 DIABETES MELLITUS WITHOUT COMPLICATION, WITHOUT LONG-TERM CURRENT USE OF INSULIN: ICD-10-CM

## 2022-05-03 LAB
ALBUMIN SERPL-MCNC: 4.4 G/DL (ref 3.5–5.2)
ALBUMIN/GLOB SERPL: 1.8 G/DL
ALP SERPL-CCNC: 71 U/L (ref 39–117)
ALT SERPL W P-5'-P-CCNC: 21 U/L (ref 1–41)
ANION GAP SERPL CALCULATED.3IONS-SCNC: 9.6 MMOL/L (ref 5–15)
AST SERPL-CCNC: 24 U/L (ref 1–40)
BILIRUB SERPL-MCNC: 0.4 MG/DL (ref 0–1.2)
BUN SERPL-MCNC: 17 MG/DL (ref 8–23)
BUN/CREAT SERPL: 15.3 (ref 7–25)
CALCIUM SPEC-SCNC: 9.3 MG/DL (ref 8.6–10.5)
CHLORIDE SERPL-SCNC: 100 MMOL/L (ref 98–107)
CO2 SERPL-SCNC: 27.4 MMOL/L (ref 22–29)
CREAT SERPL-MCNC: 1.11 MG/DL (ref 0.76–1.27)
DEPRECATED RDW RBC AUTO: 49.3 FL (ref 37–54)
EGFRCR SERPLBLD CKD-EPI 2021: 75.5 ML/MIN/1.73
ERYTHROCYTE [DISTWIDTH] IN BLOOD BY AUTOMATED COUNT: 14.1 % (ref 12.3–15.4)
GLOBULIN UR ELPH-MCNC: 2.4 GM/DL
GLUCOSE SERPL-MCNC: 94 MG/DL (ref 65–99)
HBA1C MFR BLD: 6 % (ref 4.8–5.6)
HCT VFR BLD AUTO: 49.2 % (ref 37.5–51)
HGB BLD-MCNC: 16 G/DL (ref 13–17.7)
MCH RBC QN AUTO: 31 PG (ref 26.6–33)
MCHC RBC AUTO-ENTMCNC: 32.5 G/DL (ref 31.5–35.7)
MCV RBC AUTO: 95.3 FL (ref 79–97)
PLATELET # BLD AUTO: 272 10*3/MM3 (ref 140–450)
PMV BLD AUTO: 8.3 FL (ref 6–12)
POTASSIUM SERPL-SCNC: 4.1 MMOL/L (ref 3.5–5.2)
PROT SERPL-MCNC: 6.8 G/DL (ref 6–8.5)
RBC # BLD AUTO: 5.16 10*6/MM3 (ref 4.14–5.8)
SODIUM SERPL-SCNC: 137 MMOL/L (ref 136–145)
WBC NRBC COR # BLD: 8.07 10*3/MM3 (ref 3.4–10.8)

## 2022-05-03 PROCEDURE — 80053 COMPREHEN METABOLIC PANEL: CPT | Performed by: NURSE PRACTITIONER

## 2022-05-03 PROCEDURE — 99214 OFFICE O/P EST MOD 30 MIN: CPT | Performed by: NURSE PRACTITIONER

## 2022-05-03 PROCEDURE — 36415 COLL VENOUS BLD VENIPUNCTURE: CPT | Performed by: NURSE PRACTITIONER

## 2022-05-03 PROCEDURE — 85027 COMPLETE CBC AUTOMATED: CPT | Performed by: NURSE PRACTITIONER

## 2022-05-03 PROCEDURE — 83036 HEMOGLOBIN GLYCOSYLATED A1C: CPT | Performed by: NURSE PRACTITIONER

## 2022-05-03 NOTE — PROGRESS NOTES
Subjective   Seth Manuel is a 61 y.o. male presenting today for follow up of   Chief Complaint   Patient presents with   • Pre-op Exam     Clearance for surgery tomorrow        History of Present Illness     Patient Active Problem List   Diagnosis   • Coronary artery disease involving native coronary artery   • Type 2 diabetes mellitus (HCC)   • Essential hypertension   • AMIRA (obstructive sleep apnea)   • Drug-induced erectile dysfunction   • S/P coronary artery stent placement   • HLD (hyperlipidemia)   • Osteoarthritis of both knees   • Rupture of anterior cruciate ligament of left knee   • Personal history of MI (myocardial infarction)   • Ischemic dilated cardiomyopathy (HCC)   • Allergic rhinitis   • Moderate episode of recurrent major depressive disorder (HCC)   • Sleep pattern disturbance       Outpatient Medications Marked as Taking for the 5/3/22 encounter (Office Visit) with Joselyn Ross APRN   Medication Sig Dispense Refill   • Ascorbic Acid (VITAMIN C PO) Take 3,000 Units by mouth.     • aspirin 81 MG EC tablet Take 81 mg by mouth Daily.     • carvedilol (COREG) 3.125 MG tablet Take 1 tablet by mouth 2 (Two) Times a Day With Meals. 60 tablet 0   • Coenzyme Q10 (COQ10 PO) Take 250 mg by mouth 2 (Two) Times a Day.     • DHEA 50 MG tablet Take 1 tablet by mouth Daily.     • famotidine (PEPCID) 40 MG tablet TAKE 1 TABLET DAILY 90 tablet 3   • KRILL OIL PO Take  by mouth.     • Livalo 2 MG tablet tablet TAKE 1 TABLET EVERY NIGHT 90 tablet 1   • melatonin 3 MG tablet Take 20 mg by mouth Every Night.     • metFORMIN (GLUCOPHAGE) 500 MG tablet TAKE 1 TABLET TWICE A  tablet 0   • montelukast (SINGULAIR) 10 MG tablet TAKE 1 TABLET EVERY NIGHT 90 tablet 0   • omega-3 acid ethyl esters (LOVAZA) 1 g capsule Take 2 capsules by mouth 2 (Two) Times a Day. 120 capsule 0   • sertraline (ZOLOFT) 50 MG tablet TAKE 1 TABLET BY MOUTH  DAILY 90 tablet 3   • tadalafil (CIALIS) 5 MG tablet TAKE ONE TABLET BY  "MOUTH DAILY 10 tablet 0   • traZODone (DESYREL) 50 MG tablet TAKE 1 TO 2 TABLETS AT  BEDTIME 180 tablet 0     Pt presents for pre-op evaluation.  Planned procedure is a tendon relocation affecting the L elbow.  Surgeon is Dr. Daisy tucker/ [facility]. He was referred to him by [employer].  [facility] is not requiring COVID PAT. He is asymptomatic and has had two doses of Pfizer vaccine.  He feels well with the exception of pain associated with his L UE.        The following portions of the patient's history were reviewed and updated as appropriate: allergies, current medications, past family history, past medical history, past social history, past surgical history and problem list.    Review of Systems   Constitutional: Negative for fever.   Respiratory: Negative for cough and shortness of breath.    Cardiovascular: Negative for chest pain and palpitations.   Gastrointestinal: Negative for diarrhea, nausea and vomiting.   Genitourinary: Negative for difficulty urinating.   Skin: Negative for rash and skin lesions.   Neurological: Negative for dizziness and headache.       Objective   Vitals:    05/03/22 1433   BP: 130/84   Pulse: 96   SpO2: 98%   Weight: 105 kg (232 lb)   Height: 182.9 cm (72.01\")       BP Readings from Last 3 Encounters:   05/03/22 130/84   04/27/22 140/78   01/20/22 118/66        Wt Readings from Last 3 Encounters:   05/03/22 105 kg (232 lb)   04/27/22 107 kg (235 lb)   01/20/22 102 kg (225 lb 3.2 oz)        Body mass index is 31.46 kg/m².  Nursing notes and vitals reviewed.    Physical Exam  Constitutional:       General: He is not in acute distress.     Appearance: He is well-developed.   HENT:      Head: Normocephalic.      Right Ear: External ear normal.      Left Ear: External ear normal.      Nose: Nose normal.      Mouth/Throat:      Mouth: Mucous membranes are moist.      Pharynx: Oropharynx is clear. Uvula midline.   Eyes:      Conjunctiva/sclera: Conjunctivae normal.   Neck:      Thyroid: No thyroid " mass or thyromegaly.   Cardiovascular:      Rate and Rhythm: Regular rhythm.      Pulses: Normal pulses.      Heart sounds: S1 normal and S2 normal. No murmur heard.    No friction rub. No gallop.   Pulmonary:      Effort: Pulmonary effort is normal.      Breath sounds: Normal breath sounds. No wheezing, rhonchi or rales.   Musculoskeletal:      Cervical back: Neck supple.   Lymphadenopathy:      Cervical: No cervical adenopathy.   Neurological:      Mental Status: He is alert and oriented to person, place, and time.   Psychiatric:         Attention and Perception: He is attentive.         Speech: Speech normal.         Behavior: Behavior normal.         Thought Content: Thought content normal.         Recent Results (from the past 24 hour(s))   CBC (No Diff)    Collection Time: 05/03/22  3:45 PM    Specimen: Blood   Result Value Ref Range    WBC 8.07 3.40 - 10.80 10*3/mm3    RBC 5.16 4.14 - 5.80 10*6/mm3    Hemoglobin 16.0 13.0 - 17.7 g/dL    Hematocrit 49.2 37.5 - 51.0 %    MCV 95.3 79.0 - 97.0 fL    MCH 31.0 26.6 - 33.0 pg    MCHC 32.5 31.5 - 35.7 g/dL    RDW 14.1 12.3 - 15.4 %    RDW-SD 49.3 37.0 - 54.0 fl    MPV 8.3 6.0 - 12.0 fL    Platelets 272 140 - 450 10*3/mm3   Comprehensive Metabolic Panel    Collection Time: 05/03/22  3:45 PM    Specimen: Blood   Result Value Ref Range    Glucose 94 65 - 99 mg/dL    BUN 17 8 - 23 mg/dL    Creatinine 1.11 0.76 - 1.27 mg/dL    Sodium 137 136 - 145 mmol/L    Potassium 4.1 3.5 - 5.2 mmol/L    Chloride 100 98 - 107 mmol/L    CO2 27.4 22.0 - 29.0 mmol/L    Calcium 9.3 8.6 - 10.5 mg/dL    Total Protein 6.8 6.0 - 8.5 g/dL    Albumin 4.40 3.50 - 5.20 g/dL    ALT (SGPT) 21 1 - 41 U/L    AST (SGOT) 24 1 - 40 U/L    Alkaline Phosphatase 71 39 - 117 U/L    Total Bilirubin 0.4 0.0 - 1.2 mg/dL    Globulin 2.4 gm/dL    A/G Ratio 1.8 g/dL    BUN/Creatinine Ratio 15.3 7.0 - 25.0    Anion Gap 9.6 5.0 - 15.0 mmol/L    eGFR 75.5 >60.0 mL/min/1.73   Hemoglobin A1c    Collection Time:  05/03/22  3:45 PM    Specimen: Blood   Result Value Ref Range    Hemoglobin A1C 6.00 (H) 4.80 - 5.60 %         Assessment/Plan   Diagnoses and all orders for this visit:    1. Pre-op evaluation (Primary)  -     CBC (No Diff)  -     Comprehensive Metabolic Panel  -     Hemoglobin A1c    2. Primary hypertension  -     CBC (No Diff)  -     Comprehensive Metabolic Panel    3. Type 2 diabetes mellitus without complication, without long-term current use of insulin (HCC)  -     Comprehensive Metabolic Panel  -     Hemoglobin A1c        Cardiology OV note dated 04/27/2022 and echo dated 04/28/2022 was also reviewed.    His chronic health conditions appear to be well-controlled.     I find no absolute contraindication to his planned procedure.

## 2022-05-05 ENCOUNTER — TELEPHONE (OUTPATIENT)
Dept: CARDIOLOGY | Facility: CLINIC | Age: 62
End: 2022-05-05

## 2022-05-16 DIAGNOSIS — I10 ESSENTIAL HYPERTENSION: ICD-10-CM

## 2022-05-16 NOTE — TELEPHONE ENCOUNTER
Rx Refill Note  Requested Prescriptions     Pending Prescriptions Disp Refills    carvedilol (COREG) 3.125 MG tablet 60 tablet 0     Sig: Take 1 tablet by mouth 2 (Two) Times a Day With Meals.      Last office visit with prescribing clinician: 5/3/2022      Next office visit with prescribing clinician: Visit date not found            Argelia Iglesias MA  05/16/22, 18:45 EDT

## 2022-05-16 NOTE — TELEPHONE ENCOUNTER
Caller: Seth Manuel    Relationship: Self    Best call back number:[phone]  Requested Prescriptions:   Requested Prescriptions     Pending Prescriptions Disp Refills   • carvedilol (COREG) 3.125 MG tablet 60 tablet 0     Sig: Take 1 tablet by mouth 2 (Two) Times a Day With Meals.        Pharmacy where request should be sent: [facility] [address] [facility] - [phone]  - [phone] FX     Additional details provided by patient: PATIENT OUT OF THE MEDICATION.      Does the patient have less than a 3 day supply:  [x] Yes  [] No    Ashwin Still   05/16/22 15:34 EDT

## 2022-05-17 RX ORDER — CARVEDILOL 3.12 MG/1
3.12 TABLET ORAL 2 TIMES DAILY WITH MEALS
Qty: 60 TABLET | Refills: 0 | Status: SHIPPED | OUTPATIENT
Start: 2022-05-17 | End: 2022-07-25 | Stop reason: SDUPTHER

## 2022-06-23 DIAGNOSIS — E78.5 HYPERLIPIDEMIA, UNSPECIFIED HYPERLIPIDEMIA TYPE: ICD-10-CM

## 2022-06-24 RX ORDER — OMEGA-3-ACID ETHYL ESTERS 1 G/1
CAPSULE, LIQUID FILLED ORAL
Qty: 360 CAPSULE | Refills: 3 | Status: SHIPPED | OUTPATIENT
Start: 2022-06-24

## 2022-06-24 NOTE — TELEPHONE ENCOUNTER
Rx Refill Note  Requested Prescriptions     Pending Prescriptions Disp Refills   • omega-3 acid ethyl esters (LOVAZA) 1 g capsule [Pharmacy Med Name: Omega-3-acid Ethyl Esters 1 GM Oral Capsule] 360 capsule 3     Sig: TAKE 2 CAPSULES BY MOUTH  TWICE A DAY      Last office visit with prescribing clinician: 5/3/2022      Next office visit with prescribing clinician: Visit date not found            Constance Melvin MA  06/24/22, 08:30 EDT

## 2022-07-23 DIAGNOSIS — F33.1 MODERATE EPISODE OF RECURRENT MAJOR DEPRESSIVE DISORDER: ICD-10-CM

## 2022-07-25 DIAGNOSIS — I10 ESSENTIAL HYPERTENSION: ICD-10-CM

## 2022-07-25 RX ORDER — TRAZODONE HYDROCHLORIDE 50 MG/1
TABLET ORAL
Qty: 180 TABLET | Refills: 3 | Status: SHIPPED | OUTPATIENT
Start: 2022-07-25

## 2022-07-25 RX ORDER — MONTELUKAST SODIUM 10 MG/1
TABLET ORAL
Qty: 90 TABLET | Refills: 3 | Status: SHIPPED | OUTPATIENT
Start: 2022-07-25

## 2022-07-25 RX ORDER — CARVEDILOL 3.12 MG/1
3.12 TABLET ORAL 2 TIMES DAILY WITH MEALS
Qty: 180 TABLET | Refills: 1 | Status: SHIPPED | OUTPATIENT
Start: 2022-07-25 | End: 2022-12-05

## 2022-07-25 NOTE — TELEPHONE ENCOUNTER
Caller: Seth Manuel    Relationship: Self    Best call back number: [phone]    Requested Prescriptions:   Requested Prescriptions     Pending Prescriptions Disp Refills   • carvedilol (COREG) 3.125 MG tablet 60 tablet 0     Sig: Take 1 tablet by mouth 2 (Two) Times a Day With Meals.   • pitavastatin calcium (Livalo) 2 MG tablet tablet 90 tablet 1     Sig: Take 1 tablet by mouth Every Night.        Pharmacy where request should be sent: [facility]  ([facility]) - [address] [phone] [facility] [phone]      Additional details provided by patient: PATIENT IS REQUESTING THAT THE OFFICE ASK THE PHARMACY TO FILL THIS QUICKLY AS HE IS LEAVING FOR [place] ON 7/29/22    Does the patient have less than a 3 day supply:  [] Yes  [x] No    aJyla Cuadra Rep   07/25/22 10:09 EDT

## 2022-07-25 NOTE — TELEPHONE ENCOUNTER
Rx Refill Note  Requested Prescriptions     Pending Prescriptions Disp Refills    carvedilol (COREG) 3.125 MG tablet 60 tablet 0     Sig: Take 1 tablet by mouth 2 (Two) Times a Day With Meals.    pitavastatin calcium (Livalo) 2 MG tablet tablet 90 tablet 1     Sig: Take 1 tablet by mouth Every Night.      Last office visit with prescribing clinician: 5/3/2022      Next office visit with prescribing clinician: Visit date not found            JUDY NEWBERRY MA  07/25/22, 10:57 EDT

## 2022-07-27 DIAGNOSIS — F33.1 MODERATE EPISODE OF RECURRENT MAJOR DEPRESSIVE DISORDER: ICD-10-CM

## 2022-07-27 RX ORDER — MONTELUKAST SODIUM 10 MG/1
TABLET ORAL
Qty: 90 TABLET | Refills: 3 | OUTPATIENT
Start: 2022-07-27

## 2022-07-27 RX ORDER — TRAZODONE HYDROCHLORIDE 50 MG/1
TABLET ORAL
Qty: 180 TABLET | Refills: 3 | OUTPATIENT
Start: 2022-07-27

## 2022-11-21 DIAGNOSIS — E11.9 TYPE 2 DIABETES MELLITUS WITHOUT COMPLICATION, WITHOUT LONG-TERM CURRENT USE OF INSULIN: ICD-10-CM

## 2022-11-22 NOTE — TELEPHONE ENCOUNTER
Rx Refill Note  Requested Prescriptions     Pending Prescriptions Disp Refills    metFORMIN (GLUCOPHAGE) 500 MG tablet [Pharmacy Med Name: metFORMIN HCl 500 MG Oral Tablet] 180 tablet 3     Sig: TAKE 1 TABLET BY MOUTH  TWICE DAILY      Last office visit with prescribing clinician: 5/3/2022      Next office visit with prescribing clinician: Visit date not found            Constance Melvin MA  11/22/22, 07:43 EST

## 2022-12-03 DIAGNOSIS — I10 ESSENTIAL HYPERTENSION: ICD-10-CM

## 2022-12-05 RX ORDER — PITAVASTATIN CALCIUM 2.09 MG/1
TABLET, FILM COATED ORAL
Qty: 90 TABLET | Refills: 0 | Status: SHIPPED | OUTPATIENT
Start: 2022-12-05 | End: 2023-02-23

## 2022-12-05 RX ORDER — CARVEDILOL 3.12 MG/1
TABLET ORAL
Qty: 180 TABLET | Refills: 0 | Status: SHIPPED | OUTPATIENT
Start: 2022-12-05 | End: 2023-02-23

## 2022-12-05 NOTE — TELEPHONE ENCOUNTER
Rx Refill Note  Requested Prescriptions     Pending Prescriptions Disp Refills    carvedilol (COREG) 3.125 MG tablet [Pharmacy Med Name: Carvedilol 3.125 MG Oral Tablet] 180 tablet 3     Sig: TAKE 1 TABLET BY MOUTH  TWICE DAILY WITH MEALS    Livalo 2 MG tablet tablet [Pharmacy Med Name: Livalo 2 MG Oral Tablet] 90 tablet 3     Sig: TAKE 1 TABLET BY MOUTH AT  NIGHT      Last office visit with prescribing clinician: 5/3/2022   Last telemedicine visit with prescribing clinician: 12/15/2022   Next office visit with prescribing clinician: 12/22/2022                         Would you like a call back once the refill request has been completed: [] Yes [] No    If the office needs to give you a call back, can they leave a voicemail: [] Yes [] No    Argelia Iglesias MA  12/05/22, 15:23 EST

## 2022-12-06 ENCOUNTER — TELEPHONE (OUTPATIENT)
Dept: INTERNAL MEDICINE | Facility: CLINIC | Age: 62
End: 2022-12-06

## 2022-12-06 DIAGNOSIS — E11.9 TYPE 2 DIABETES MELLITUS WITHOUT COMPLICATION, WITHOUT LONG-TERM CURRENT USE OF INSULIN: Chronic | ICD-10-CM

## 2022-12-06 DIAGNOSIS — E78.2 MIXED HYPERLIPIDEMIA: Primary | Chronic | ICD-10-CM

## 2022-12-06 DIAGNOSIS — Z12.5 ENCOUNTER FOR SCREENING FOR MALIGNANT NEOPLASM OF PROSTATE: ICD-10-CM

## 2022-12-06 NOTE — TELEPHONE ENCOUNTER
Patient is coming in for labs on 12/15 and is needing lab orders can you please put those in

## 2022-12-22 ENCOUNTER — OFFICE VISIT (OUTPATIENT)
Dept: INTERNAL MEDICINE | Facility: CLINIC | Age: 62
End: 2022-12-22

## 2022-12-22 VITALS
HEART RATE: 101 BPM | BODY MASS INDEX: 30.48 KG/M2 | TEMPERATURE: 98.6 F | HEIGHT: 72 IN | WEIGHT: 225 LBS | OXYGEN SATURATION: 96 % | DIASTOLIC BLOOD PRESSURE: 74 MMHG | SYSTOLIC BLOOD PRESSURE: 128 MMHG

## 2022-12-22 DIAGNOSIS — Z00.00 ENCOUNTER FOR WELLNESS EXAMINATION IN ADULT: Primary | ICD-10-CM

## 2022-12-22 DIAGNOSIS — I10 ESSENTIAL HYPERTENSION: Chronic | ICD-10-CM

## 2022-12-22 DIAGNOSIS — Z12.11 SCREENING FOR MALIGNANT NEOPLASM OF COLON: ICD-10-CM

## 2022-12-22 DIAGNOSIS — E78.2 MIXED HYPERLIPIDEMIA: Chronic | ICD-10-CM

## 2022-12-22 DIAGNOSIS — I25.2 PERSONAL HISTORY OF MI (MYOCARDIAL INFARCTION): Chronic | ICD-10-CM

## 2022-12-22 DIAGNOSIS — G47.20 SLEEP PATTERN DISTURBANCE: Chronic | ICD-10-CM

## 2022-12-22 DIAGNOSIS — E11.9 TYPE 2 DIABETES MELLITUS WITHOUT COMPLICATION, WITHOUT LONG-TERM CURRENT USE OF INSULIN: Chronic | ICD-10-CM

## 2022-12-22 DIAGNOSIS — R09.81 NASAL CONGESTION DUE TO PROLONGED USE OF DECONGESTANTS: ICD-10-CM

## 2022-12-22 DIAGNOSIS — T48.5X5A NASAL CONGESTION DUE TO PROLONGED USE OF DECONGESTANTS: ICD-10-CM

## 2022-12-22 DIAGNOSIS — Z95.5 S/P CORONARY ARTERY STENT PLACEMENT: Chronic | ICD-10-CM

## 2022-12-22 DIAGNOSIS — Z12.2 ENCOUNTER FOR SCREENING FOR MALIGNANT NEOPLASM OF LUNG: ICD-10-CM

## 2022-12-22 DIAGNOSIS — G44.209 TENSION HEADACHE: ICD-10-CM

## 2022-12-22 DIAGNOSIS — I25.10 CORONARY ARTERY DISEASE INVOLVING NATIVE CORONARY ARTERY OF NATIVE HEART WITHOUT ANGINA PECTORIS: Chronic | ICD-10-CM

## 2022-12-22 PROCEDURE — 99396 PREV VISIT EST AGE 40-64: CPT | Performed by: NURSE PRACTITIONER

## 2022-12-22 RX ORDER — CYCLOBENZAPRINE HCL 10 MG
10 TABLET ORAL NIGHTLY PRN
Qty: 30 TABLET | Refills: 0 | Status: SHIPPED | OUTPATIENT
Start: 2022-12-22

## 2022-12-22 NOTE — PROGRESS NOTES
SUBJECTIVE    Seth is a 62 y.o. male presenting for Annual Exam (Physical/Pt complains nose is always stopped up. Having frequent headaches.)    His current/chronic health conditions include:  Patient Active Problem List   Diagnosis   • Coronary artery disease involving native coronary artery   • Type 2 diabetes mellitus (HCC)   • Essential hypertension   • AMIRA (obstructive sleep apnea)   • Drug-induced erectile dysfunction   • S/P coronary artery stent placement   • HLD (hyperlipidemia)   • Osteoarthritis of both knees   • Rupture of anterior cruciate ligament of left knee   • Personal history of MI (myocardial infarction)   • Ischemic dilated cardiomyopathy (HCC)   • Allergic rhinitis   • Moderate episode of recurrent major depressive disorder (HCC)   • Sleep pattern disturbance       Outpatient Medications Marked as Taking for the 12/22/22 encounter (Office Visit) with Joselyn Ross APRN   Medication Sig Dispense Refill   • Ascorbic Acid (VITAMIN C PO) Take 3,000 Units by mouth.     • aspirin 81 MG EC tablet Take 81 mg by mouth Daily.     • carvedilol (COREG) 3.125 MG tablet TAKE 1 TABLET BY MOUTH  TWICE DAILY WITH MEALS 180 tablet 0   • Coenzyme Q10 (COQ10 PO) Take 250 mg by mouth 2 (Two) Times a Day.     • DHEA 50 MG tablet Take 1 tablet by mouth Daily.     • famotidine (PEPCID) 40 MG tablet TAKE 1 TABLET DAILY 90 tablet 3   • KRILL OIL PO Take  by mouth.     • Livalo 2 MG tablet tablet TAKE 1 TABLET BY MOUTH AT  NIGHT 90 tablet 0   • melatonin 3 MG tablet Take 20 mg by mouth Every Night.     • metFORMIN (GLUCOPHAGE) 500 MG tablet TAKE 1 TABLET BY MOUTH  TWICE DAILY 180 tablet 0   • montelukast (SINGULAIR) 10 MG tablet TAKE 1 TABLET BY MOUTH AT  NIGHT 90 tablet 3   • omega-3 acid ethyl esters (LOVAZA) 1 g capsule TAKE 2 CAPSULES BY MOUTH  TWICE A  capsule 3   • tadalafil (CIALIS) 5 MG tablet TAKE ONE TABLET BY MOUTH DAILY 10 tablet 0   • traZODone (DESYREL) 50 MG tablet TAKE 1 TO 2 TABLETS BY   "MOUTH AT BEDTIME 180 tablet 3        Mr. Manuel has HTN, HLD, and DM. He reports his is doing well. He is tolerating his current medication regimen w/o SEs.     Trazodone continues to control his insomnia.    He was out of metformin for approx one month d/t issues with his pharmacy.       Health Habits:  Nutrition: \"Not where you want it to be.\" \"Would like to eat  and get back on paleo, but too expensive and not an option right now.\"  Exercise: He works 12 hours a day and walks a lot. He does not exercise outside of work d/t lack of time.    Screenings:  PSA: 0.668 12/15/2022  Colon Cancer: His last CLS was in/around 2008. He is not interested in Cologuard. He is agreeable to referral to GI and sts \"we can try.\"  Tob use: he is agreeable to LDCT if it can be worked out around his work schedule  Eye Exam: Spring 2022 at \[facility]\""      Complaints today include:  Pt c/o nasal stuffiness. He has used afrin at least QHS for the past 6yrs. Prior to that he used Zicam. He is concerned he may have nasal polyps.    He c/o a daily HA x3 weeks. This begins at the base of the skull and radiates forward. He is taking Ibu.      The patient's allergies, current medications, problem list, past medical history, past family history, and past social history were reviewed and updated as appropriate.        OBJECTIVE    Vitals:    12/22/22 1112   BP: 128/74   BP Location: Left arm   Pulse: 101   Temp: 98.6 °F (37 °C)   TempSrc: Infrared   SpO2: 96%   Weight: 102 kg (225 lb)   Height: 182.9 cm (72\")       BP Readings from Last 3 Encounters:   12/22/22 128/74   05/03/22 130/84   04/27/22 140/78       Wt Readings from Last 3 Encounters:   12/22/22 102 kg (225 lb)   05/03/22 105 kg (232 lb)   04/27/22 107 kg (235 lb)       Body mass index is 30.52 kg/m².  Nursing notes and vital signs reviewed.    Physical Exam  Constitutional:       General: He is not in acute distress.     Appearance: Normal appearance. He is well-developed.   HENT:      " Head: Normocephalic.      Right Ear: Hearing, tympanic membrane, ear canal and external ear normal.      Left Ear: Hearing, tympanic membrane, ear canal and external ear normal.      Nose: Nose normal. No mucosal edema or rhinorrhea.      Mouth/Throat:      Mouth: Mucous membranes are moist.      Pharynx: Oropharynx is clear. Uvula midline.   Eyes:      General: Lids are normal.      Extraocular Movements: Extraocular movements intact.      Conjunctiva/sclera: Conjunctivae normal.      Pupils: Pupils are equal, round, and reactive to light.   Neck:      Thyroid: No thyroid mass or thyromegaly.   Cardiovascular:      Rate and Rhythm: Regular rhythm.      Pulses: Normal pulses.      Heart sounds: S1 normal and S2 normal. No murmur heard.    No friction rub. No gallop.   Pulmonary:      Effort: Pulmonary effort is normal.      Breath sounds: Normal breath sounds. No wheezing, rhonchi or rales.   Abdominal:      General: Bowel sounds are normal.      Palpations: Abdomen is soft.      Tenderness: There is no abdominal tenderness. There is no guarding.      Hernia: No hernia is present.   Musculoskeletal:         General: No deformity. Normal range of motion.      Cervical back: Normal range of motion and neck supple.   Lymphadenopathy:      Cervical: No cervical adenopathy.   Skin:     General: Skin is warm and dry.      Findings: No lesion or rash.   Neurological:      General: No focal deficit present.      Mental Status: He is alert and oriented to person, place, and time.      Cranial Nerves: No cranial nerve deficit.      Sensory: No sensory deficit.      Motor: Motor function is intact.      Coordination: Coordination is intact.      Gait: Gait normal.      Deep Tendon Reflexes: Reflexes are normal and symmetric.   Psychiatric:         Attention and Perception: He is attentive.         Mood and Affect: Mood and affect normal.         Speech: Speech normal.         Behavior: Behavior normal.         Thought Content:  Thought content normal.           Recent Results (from the past 672 hour(s))   CBC (No Diff)    Collection Time: 12/15/22 11:10 AM    Specimen: Blood   Result Value Ref Range    WBC 8.11 3.40 - 10.80 10*3/mm3    RBC 4.85 4.14 - 5.80 10*6/mm3    Hemoglobin 15.2 13.0 - 17.7 g/dL    Hematocrit 43.6 37.5 - 51.0 %    MCV 89.9 79.0 - 97.0 fL    MCH 31.3 26.6 - 33.0 pg    MCHC 34.9 31.5 - 35.7 g/dL    RDW 13.3 12.3 - 15.4 %    Platelets 250 140 - 450 10*3/mm3   Comprehensive Metabolic Panel    Collection Time: 12/15/22 11:10 AM    Specimen: Blood   Result Value Ref Range    Glucose 96 65 - 99 mg/dL    BUN 14 8 - 23 mg/dL    Creatinine 1.04 0.76 - 1.27 mg/dL    EGFR Result 81.2 >60.0 mL/min/1.73    BUN/Creatinine Ratio 13.5 7.0 - 25.0    Sodium 141 136 - 145 mmol/L    Potassium 4.0 3.5 - 5.2 mmol/L    Chloride 100 98 - 107 mmol/L    Total CO2 28.6 22.0 - 29.0 mmol/L    Calcium 9.5 8.6 - 10.5 mg/dL    Total Protein 6.5 6.0 - 8.5 g/dL    Albumin 4.70 3.50 - 5.20 g/dL    Globulin 1.8 gm/dL    A/G Ratio 2.6 g/dL    Total Bilirubin 0.5 0.0 - 1.2 mg/dL    Alkaline Phosphatase 48 39 - 117 U/L    AST (SGOT) 24 1 - 40 U/L    ALT (SGPT) 24 1 - 41 U/L   Hemoglobin A1c    Collection Time: 12/15/22 11:10 AM    Specimen: Blood   Result Value Ref Range    Hemoglobin A1C 6.40 (H) 4.80 - 5.60 %   Lipid Panel With / Chol / HDL Ratio    Collection Time: 12/15/22 11:10 AM    Specimen: Blood   Result Value Ref Range    Total Cholesterol 203 (H) 0 - 200 mg/dL    Triglycerides 303 (H) 0 - 150 mg/dL    HDL Cholesterol 39 (L) 40 - 60 mg/dL    VLDL Cholesterol Juan Antonio 52 (H) 5 - 40 mg/dL    LDL Chol Calc (NIH) 112 (H) 0 - 100 mg/dL    Chol/HDL Ratio 5.21    TSH    Collection Time: 12/15/22 11:10 AM    Specimen: Blood   Result Value Ref Range    TSH 2.750 0.270 - 4.200 uIU/mL   PSA Screen    Collection Time: 12/15/22 11:10 AM    Specimen: Blood   Result Value Ref Range    PSA 0.668 0.000 - 4.000 ng/mL   Microalbumin / Creatinine Urine Ratio - Urine,  Clean Catch    Collection Time: 12/15/22 11:10 AM    Specimen: Urine, Clean Catch   Result Value Ref Range    Creatinine, Urine 52.4 Not Estab. mg/dL    Microalbumin, Urine 4.5 Not Estab. ug/mL    Microalbumin/Creatinine Ratio 9 0 - 29 mg/g creat         ASSESSMENT AND PLAN    Diagnoses and all orders for this visit:    1. Encounter for wellness examination in adult (Primary)    2. Screening for malignant neoplasm of colon  -     Ambulatory Referral to Gastroenterology    3. Encounter for screening for malignant neoplasm of lung  -     CT Chest Low Dose Wo; Future    4. Tension headache  -     cyclobenzaprine (FLEXERIL) 10 MG tablet; Take 1 tablet by mouth At Night As Needed (headache).  Dispense: 30 tablet; Refill: 0    5. Nasal congestion due to prolonged use of decongestants    6. Essential hypertension    7. Mixed hyperlipidemia    8. Coronary artery disease involving native coronary artery of native heart without angina pectoris    9. Personal history of MI (myocardial infarction)    10. S/P coronary artery stent placement    11. Type 2 diabetes mellitus without complication, without long-term current use of insulin (HCC)    12. Sleep pattern disturbance          Preventative counseling completed including relevant screenings, appropriate vaccinations, healthy nutrition, and appropriate physical activity. Age-appropriate Screening & Interventions recommended by USPSTF were reviewed with the patient, and Health Maintenance was updated in Epic.  BMI is >= 30 and <35. (Class 1 Obesity). The following options were offered after discussion;: exercise counseling/recommendations and nutrition counseling/recommendations      No chronic med change.  Advised to d/c Afrin. Try Flonase.  Recommend Dr. Soto for ENT. Pt can self-schedule.  Declines any discussion re vaccines.      Medications, including side effects, were discussed with the patient. Patient verbalized understanding.  The plan of care was discussed. All  questions were answered. Patient verbalized understanding.        Return in about 6 months (around 6/22/2023) for fasting labs one week prior to., or sooner if needed.

## 2023-01-06 ENCOUNTER — HOSPITAL ENCOUNTER (OUTPATIENT)
Dept: CT IMAGING | Facility: HOSPITAL | Age: 63
Discharge: HOME | End: 2023-01-06
Admitting: NURSE PRACTITIONER
Payer: COMMERCIAL

## 2023-01-06 DIAGNOSIS — Z12.2 ENCOUNTER FOR SCREENING FOR MALIGNANT NEOPLASM OF LUNG: ICD-10-CM

## 2023-01-06 PROCEDURE — 71271 CT THORAX LUNG CANCER SCR C-: CPT

## 2023-01-09 ENCOUNTER — TELEPHONE (OUTPATIENT)
Dept: INTERNAL MEDICINE | Facility: CLINIC | Age: 63
End: 2023-01-09
Payer: COMMERCIAL

## 2023-01-09 NOTE — TELEPHONE ENCOUNTER
Okay for hub to share with pt,CT scan negative for lung cancer. He has developed emphysema from smoking. CT scan needs to be repeated in 12 months.

## 2023-01-10 ENCOUNTER — TELEPHONE (OUTPATIENT)
Dept: INTERNAL MEDICINE | Facility: CLINIC | Age: 63
End: 2023-01-10

## 2023-01-10 NOTE — TELEPHONE ENCOUNTER
PATIENT RETURNED OFFICE PHONE CALL.    HUB STAFF RELAYED INSTRUCTED INFORMATION, PATIENT STATED UNDERSTAND AND AGREED TO RECHECK IN 12 MONTHS.

## 2023-01-16 DIAGNOSIS — E11.9 TYPE 2 DIABETES MELLITUS WITHOUT COMPLICATION, WITHOUT LONG-TERM CURRENT USE OF INSULIN: ICD-10-CM

## 2023-01-17 NOTE — TELEPHONE ENCOUNTER
Rx Refill Note  Requested Prescriptions     Pending Prescriptions Disp Refills    metFORMIN (GLUCOPHAGE) 500 MG tablet [Pharmacy Med Name: metFORMIN HCl 500 MG Oral Tablet] 180 tablet 3     Sig: TAKE 1 TABLET BY MOUTH  TWICE DAILY      Last office visit with prescribing clinician: 12/22/2022   Last telemedicine visit with prescribing clinician: 6/22/2023   Next office visit with prescribing clinician: 7/10/2023                         Would you like a call back once the refill request has been completed: [] Yes [] No    If the office needs to give you a call back, can they leave a voicemail: [] Yes [] No    Saima Guerrier MA  01/17/23, 08:49 EST

## 2023-02-20 DIAGNOSIS — I10 ESSENTIAL HYPERTENSION: ICD-10-CM

## 2023-02-23 RX ORDER — PITAVASTATIN CALCIUM 2.09 MG/1
TABLET, FILM COATED ORAL
Qty: 90 TABLET | Refills: 3 | Status: SHIPPED | OUTPATIENT
Start: 2023-02-23

## 2023-02-23 RX ORDER — FAMOTIDINE 40 MG/1
TABLET, FILM COATED ORAL
Qty: 90 TABLET | Refills: 3 | Status: SHIPPED | OUTPATIENT
Start: 2023-02-23

## 2023-02-23 RX ORDER — CARVEDILOL 3.12 MG/1
TABLET ORAL
Qty: 180 TABLET | Refills: 3 | Status: SHIPPED | OUTPATIENT
Start: 2023-02-23

## 2023-04-08 DIAGNOSIS — F33.1 MODERATE EPISODE OF RECURRENT MAJOR DEPRESSIVE DISORDER: ICD-10-CM

## 2023-04-09 NOTE — TELEPHONE ENCOUNTER
Rx Refill Note  Requested Prescriptions     Pending Prescriptions Disp Refills    sertraline (ZOLOFT) 50 MG tablet [Pharmacy Med Name: Sertraline HCl 50 MG Oral Tablet] 90 tablet 3     Sig: TAKE 1 TABLET BY MOUTH  DAILY      Last office visit with prescribing clinician: 12/22/2022   Last telemedicine visit with prescribing clinician: 6/22/2023   Next office visit with prescribing clinician: 7/10/2023                         Would you like a call back once the refill request has been completed: [] Yes [] No    If the office needs to give you a call back, can they leave a voicemail: [] Yes [] No    Argelia Iglesias MA  04/09/23, 11:14 EDT

## 2023-05-28 DIAGNOSIS — F33.1 MODERATE EPISODE OF RECURRENT MAJOR DEPRESSIVE DISORDER: ICD-10-CM

## 2023-05-28 DIAGNOSIS — E78.5 HYPERLIPIDEMIA, UNSPECIFIED HYPERLIPIDEMIA TYPE: ICD-10-CM

## 2023-05-30 RX ORDER — TRAZODONE HYDROCHLORIDE 50 MG/1
TABLET ORAL
Qty: 180 TABLET | Refills: 3 | Status: SHIPPED | OUTPATIENT
Start: 2023-05-30

## 2023-05-30 RX ORDER — MONTELUKAST SODIUM 10 MG/1
TABLET ORAL
Qty: 90 TABLET | Refills: 3 | Status: SHIPPED | OUTPATIENT
Start: 2023-05-30

## 2023-05-30 RX ORDER — OMEGA-3-ACID ETHYL ESTERS 1 G/1
CAPSULE, LIQUID FILLED ORAL
Qty: 360 CAPSULE | Refills: 3 | Status: SHIPPED | OUTPATIENT
Start: 2023-05-30

## 2023-05-30 NOTE — TELEPHONE ENCOUNTER
Rx Refill Note  Requested Prescriptions     Pending Prescriptions Disp Refills    traZODone (DESYREL) 50 MG tablet [Pharmacy Med Name: traZODone HCl 50 MG Oral Tablet] 180 tablet 3     Sig: TAKE 1 TO 2 TABLETS BY  MOUTH AT BEDTIME    montelukast (SINGULAIR) 10 MG tablet [Pharmacy Med Name: Montelukast Sodium 10 MG Oral Tablet] 90 tablet 3     Sig: TAKE 1 TABLET BY MOUTH AT  NIGHT    omega-3 acid ethyl esters (LOVAZA) 1 g capsule [Pharmacy Med Name: Omega-3-acid Ethyl Esters 1 GM Oral Capsule] 360 capsule 3     Sig: TAKE 2 CAPSULES BY MOUTH  TWICE DAILY      Last office visit with prescribing clinician: 12/22/2022   Last telemedicine visit with prescribing clinician: Visit date not found   Next office visit with prescribing clinician: 7/10/2023                         Would you like a call back once the refill request has been completed: [] Yes [] No    If the office needs to give you a call back, can they leave a voicemail: [] Yes [] No    Argelia Iglesias MA  05/30/23, 08:09 EDT

## 2023-06-15 DIAGNOSIS — E78.2 MIXED HYPERLIPIDEMIA: Chronic | ICD-10-CM

## 2023-06-15 DIAGNOSIS — E11.9 TYPE 2 DIABETES MELLITUS WITHOUT COMPLICATION, WITHOUT LONG-TERM CURRENT USE OF INSULIN: Chronic | ICD-10-CM

## 2023-06-15 DIAGNOSIS — I10 ESSENTIAL HYPERTENSION: Chronic | ICD-10-CM

## 2023-06-23 LAB
ALBUMIN SERPL-MCNC: 4.6 G/DL (ref 3.5–5.2)
ALBUMIN/GLOB SERPL: 2.4 G/DL
ALP SERPL-CCNC: 65 U/L (ref 39–117)
ALT SERPL-CCNC: 15 U/L (ref 1–41)
AST SERPL-CCNC: 23 U/L (ref 1–40)
BILIRUB SERPL-MCNC: 0.6 MG/DL (ref 0–1.2)
BUN SERPL-MCNC: 10 MG/DL (ref 8–23)
BUN/CREAT SERPL: 10.9 (ref 7–25)
CALCIUM SERPL-MCNC: 9.5 MG/DL (ref 8.6–10.5)
CHLORIDE SERPL-SCNC: 93 MMOL/L (ref 98–107)
CHOLEST SERPL-MCNC: 161 MG/DL (ref 0–200)
CHOLEST/HDLC SERPL: 2.88 {RATIO}
CO2 SERPL-SCNC: 27.5 MMOL/L (ref 22–29)
CREAT SERPL-MCNC: 0.92 MG/DL (ref 0.76–1.27)
EGFRCR SERPLBLD CKD-EPI 2021: 94.1 ML/MIN/1.73
GLOBULIN SER CALC-MCNC: 1.9 GM/DL
GLUCOSE SERPL-MCNC: 103 MG/DL (ref 65–99)
HBA1C MFR BLD: 5.8 % (ref 4.8–5.6)
HDLC SERPL-MCNC: 56 MG/DL (ref 40–60)
LDLC SERPL CALC-MCNC: 79 MG/DL (ref 0–100)
POTASSIUM SERPL-SCNC: 4.2 MMOL/L (ref 3.5–5.2)
PROT SERPL-MCNC: 6.5 G/DL (ref 6–8.5)
SODIUM SERPL-SCNC: 135 MMOL/L (ref 136–145)
TRIGL SERPL-MCNC: 149 MG/DL (ref 0–150)
VLDLC SERPL CALC-MCNC: 26 MG/DL (ref 5–40)

## 2023-08-14 ENCOUNTER — HOSPITAL ENCOUNTER (EMERGENCY)
Facility: HOSPITAL | Age: 63
Discharge: HOME | End: 2023-08-14
Attending: STUDENT IN AN ORGANIZED HEALTH CARE EDUCATION/TRAINING PROGRAM | Admitting: STUDENT IN AN ORGANIZED HEALTH CARE EDUCATION/TRAINING PROGRAM
Payer: COMMERCIAL

## 2023-08-14 ENCOUNTER — APPOINTMENT (OUTPATIENT)
Dept: ULTRASOUND IMAGING | Facility: HOSPITAL | Age: 63
End: 2023-08-14
Payer: COMMERCIAL

## 2023-08-14 VITALS
SYSTOLIC BLOOD PRESSURE: 134 MMHG | DIASTOLIC BLOOD PRESSURE: 84 MMHG | BODY MASS INDEX: 25.8 KG/M2 | HEART RATE: 92 BPM | WEIGHT: 201 LBS | OXYGEN SATURATION: 94 % | HEIGHT: 74 IN | TEMPERATURE: 98.1 F | RESPIRATION RATE: 12 BRPM

## 2023-08-14 DIAGNOSIS — M79.604 PAIN OF RIGHT LOWER EXTREMITY: ICD-10-CM

## 2023-08-14 DIAGNOSIS — M71.21 POPLITEAL CYST, RIGHT: Primary | ICD-10-CM

## 2023-08-14 LAB
ALBUMIN SERPL-MCNC: 4.5 G/DL (ref 3.5–5.2)
ALBUMIN/GLOB SERPL: 2 G/DL
ALP SERPL-CCNC: 56 U/L (ref 39–117)
ALT SERPL W P-5'-P-CCNC: 18 U/L (ref 1–41)
ANION GAP SERPL CALCULATED.3IONS-SCNC: 11.2 MMOL/L (ref 5–15)
APTT PPP: 29.3 SECONDS (ref 24.3–38.1)
AST SERPL-CCNC: 20 U/L (ref 1–40)
BASOPHILS # BLD AUTO: 0.04 10*3/MM3 (ref 0–0.2)
BASOPHILS NFR BLD AUTO: 0.3 % (ref 0–1.5)
BILIRUB SERPL-MCNC: 0.3 MG/DL (ref 0–1.2)
BUN SERPL-MCNC: 10 MG/DL (ref 8–23)
BUN/CREAT SERPL: 11.2 (ref 7–25)
CALCIUM SPEC-SCNC: 9.1 MG/DL (ref 8.6–10.5)
CHLORIDE SERPL-SCNC: 93 MMOL/L (ref 98–107)
CO2 SERPL-SCNC: 25.8 MMOL/L (ref 22–29)
CREAT SERPL-MCNC: 0.89 MG/DL (ref 0.76–1.27)
DEPRECATED RDW RBC AUTO: 45.7 FL (ref 37–54)
EGFRCR SERPLBLD CKD-EPI 2021: 96.9 ML/MIN/1.73
EOSINOPHIL # BLD AUTO: 0.1 10*3/MM3 (ref 0–0.4)
EOSINOPHIL NFR BLD AUTO: 0.8 % (ref 0.3–6.2)
ERYTHROCYTE [DISTWIDTH] IN BLOOD BY AUTOMATED COUNT: 13.2 % (ref 12.3–15.4)
GLOBULIN UR ELPH-MCNC: 2.3 GM/DL
GLUCOSE SERPL-MCNC: 117 MG/DL (ref 65–99)
HCT VFR BLD AUTO: 43.3 % (ref 37.5–51)
HGB BLD-MCNC: 14.3 G/DL (ref 13–17.7)
IMM GRANULOCYTES # BLD AUTO: 0.05 10*3/MM3 (ref 0–0.05)
IMM GRANULOCYTES NFR BLD AUTO: 0.4 % (ref 0–0.5)
INR PPP: 1.06 (ref 0.9–1.1)
LYMPHOCYTES # BLD AUTO: 1.34 10*3/MM3 (ref 0.7–3.1)
LYMPHOCYTES NFR BLD AUTO: 10.6 % (ref 19.6–45.3)
MCH RBC QN AUTO: 30.6 PG (ref 26.6–33)
MCHC RBC AUTO-ENTMCNC: 33 G/DL (ref 31.5–35.7)
MCV RBC AUTO: 92.7 FL (ref 79–97)
MONOCYTES # BLD AUTO: 1.33 10*3/MM3 (ref 0.1–0.9)
MONOCYTES NFR BLD AUTO: 10.6 % (ref 5–12)
NEUTROPHILS NFR BLD AUTO: 77.3 % (ref 42.7–76)
NEUTROPHILS NFR BLD AUTO: 9.73 10*3/MM3 (ref 1.7–7)
PLATELET # BLD AUTO: 254 10*3/MM3 (ref 140–450)
PMV BLD AUTO: 8 FL (ref 6–12)
POTASSIUM SERPL-SCNC: 4.5 MMOL/L (ref 3.5–5.2)
PROT SERPL-MCNC: 6.8 G/DL (ref 6–8.5)
PROTHROMBIN TIME: 13.8 SECONDS (ref 12.1–15)
RBC # BLD AUTO: 4.67 10*6/MM3 (ref 4.14–5.8)
SODIUM SERPL-SCNC: 130 MMOL/L (ref 136–145)
WBC NRBC COR # BLD: 12.59 10*3/MM3 (ref 3.4–10.8)

## 2023-08-14 PROCEDURE — 36415 COLL VENOUS BLD VENIPUNCTURE: CPT

## 2023-08-14 PROCEDURE — 85730 THROMBOPLASTIN TIME PARTIAL: CPT | Performed by: STUDENT IN AN ORGANIZED HEALTH CARE EDUCATION/TRAINING PROGRAM

## 2023-08-14 PROCEDURE — 85610 PROTHROMBIN TIME: CPT | Performed by: STUDENT IN AN ORGANIZED HEALTH CARE EDUCATION/TRAINING PROGRAM

## 2023-08-14 PROCEDURE — 99284 EMERGENCY DEPT VISIT MOD MDM: CPT

## 2023-08-14 PROCEDURE — 80053 COMPREHEN METABOLIC PANEL: CPT | Performed by: STUDENT IN AN ORGANIZED HEALTH CARE EDUCATION/TRAINING PROGRAM

## 2023-08-14 PROCEDURE — 85025 COMPLETE CBC W/AUTO DIFF WBC: CPT | Performed by: STUDENT IN AN ORGANIZED HEALTH CARE EDUCATION/TRAINING PROGRAM

## 2023-08-14 PROCEDURE — 93971 EXTREMITY STUDY: CPT

## 2023-08-14 NOTE — ED PROVIDER NOTES
Subjective   History of Present Illness  Pt is a 62 y.o. male with PMH as listed who presents for   Chief Complaint   Patient presents with    Leg Pain    Leg Swelling     Sudden onset swelling and pain to RLE/calf. First noticed approx 6pm last night upon standing after sitting for approx 1 hour watching a ball game. Denies trauma. Pain increases with flexion of foot. RLE tight, firm to touch. Strong pedal pulse. Brisk cap refill to ext. Denies CP/SOB. No recent periods of travel or prolonged sittin       Patient states that approximate 6 PM last night he noticed significant swelling and pain to his right lower extremity after watching a possible time.  He states that he has not had any recent travel other than a 4-hour drive proximally 1 to 1.5 weeks ago.  Has not had any known injury to the area.  Denies any fever, chest pain, shortness of breath.  Has no other complaints at this time.    Review of Systems    Past Medical History:   Diagnosis Date    Allergic rhinitis 10/27/2017    Arthritis     Coronary artery disease     As noted in medical records    Erectile dysfunction     Hyperlipidemia     Hypertension     Myocardial infarction 07/11/2014    Sleep apnea        Allergies   Allergen Reactions    Crestor [Rosuvastatin Calcium] Myalgia    Lipitor [Atorvastatin] Myalgia       Past Surgical History:   Procedure Laterality Date    BICEPS TENDON REPAIR  2022    CORONARY STENT PLACEMENT  2014    Medtronic Resolute stent into LAD    HERNIA REPAIR  2008    Umbilical     TONSILLECTOMY      VASECTOMY      As noted in medical records       Family History   Problem Relation Age of Onset    Hypertension Mother     Diabetes Mother     Graves' disease Mother     Rheum arthritis Mother     Heart attack Mother 50    Hypertension Father     Cancer Father         Throat    Stroke Father 76    Heart attack Father 50    Diabetes Brother     Lung cancer Paternal Uncle     Rheum arthritis Maternal Grandmother     Heart disease  Maternal Grandfather     Diabetes Paternal Grandmother     Heart disease Paternal Grandfather        Social History     Socioeconomic History    Marital status:    Tobacco Use    Smoking status: Former     Packs/day: 1.00     Years: 40.00     Pack years: 40.00     Types: Cigarettes     Start date: 1974     Quit date: 2015     Years since quittin.6    Smokeless tobacco: Never   Substance and Sexual Activity    Alcohol use: Yes     Alcohol/week: 6.0 standard drinks     Types: 6 Cans of beer per week     Comment: beer 2-3 days/week    Drug use: No    Sexual activity: Defer     Partners: Female     Birth control/protection: None           Objective   Physical Exam  Constitutional:       Appearance: Normal appearance.   HENT:      Head: Normocephalic and atraumatic.      Mouth/Throat:      Mouth: Mucous membranes are moist.      Pharynx: Oropharynx is clear.   Eyes:      Conjunctiva/sclera: Conjunctivae normal.   Pulmonary:      Effort: Pulmonary effort is normal.   Abdominal:      General: Abdomen is flat.   Musculoskeletal:      Cervical back: Neck supple.      Comments: Marked swelling of right lower extremity with tenderness to palpation over the calf and popliteal region.  2+ distal pulses, normal sensation distally.  Full range of motion right ankle.  Positive Homans' sign.   Skin:     General: Skin is warm and dry.   Neurological:      Mental Status: He is alert.   Psychiatric:         Mood and Affect: Mood normal.       Procedures           ED Course                                           Medical Decision Making  My diagnosis for lower extremity pain and injury includes but is not limited to hip fracture, femur fracture, hip dislocation, hip contusion, hip sprain, hip strain, pelvic fracture, ischio-tibial band pain, ischio-tibial band bursitis, knee sprain, patella dislocation, knee dislocation, internal derangement of knee, fractures of the femur, tibia, fibula, ankle, foot and digits,  ankle sprain, ankle dislocation, Lisfranc fracture, fracture dislocations of the digits, pulmonary embolism, claudication, peripheral vascular disease, gout, osteoarthritis, rheumatoid arthritis, bursitis, septic joint, poly-rheumatica, polyarthralgia and other inflammatory or infectious disease processes.      Problems Addressed:  Pain of right lower extremity: complicated acute illness or injury  Popliteal cyst, right: complicated acute illness or injury    Amount and/or Complexity of Data Reviewed  Labs: ordered.     Details: Labs resulted, slight white count elevation of 12, given no fever, tachycardia, or any other associated signs for infectious process suspicion for acute infectious process.  Radiology: ordered.     Details: Ultrasound negative for acute DVT, popliteal cyst present large complex       spoke with patient regarding results of labs and imaging and plan for discharge with PCP and orthopedic surgery follow-up given large complex popliteal cyst.  Patient struck take Tylenol and ibuprofen for pain control.  Patient understands and agrees with plan of care.  Offered patient crutches, walker, or cane on discharge, declined at this time stating that he would know with these when he goes to work this evening.  [facility], patient ambulatory at time of discharge.    Final diagnoses:   Popliteal cyst, right   Pain of right lower extremity       ED Disposition  ED Disposition       ED Disposition   Discharge    Condition   Stable    Comment   --               Joselyn Ross, APRN  [address] [facility] [address]  [place] [id number]  [phone]    In 2 days      Kal Prescott MD  [address] [facility] [address]  [facility] [address]  [phone]    Call   to establish care         Medication List      No changes were made to your prescriptions during this visit.            Ken Chavez MD  08/14/23 [id number]

## 2023-08-19 ENCOUNTER — HOSPITAL ENCOUNTER (EMERGENCY)
Facility: HOSPITAL | Age: 63
Discharge: HOME | End: 2023-08-19
Attending: EMERGENCY MEDICINE
Payer: COMMERCIAL

## 2023-08-19 VITALS
BODY MASS INDEX: 27.17 KG/M2 | HEIGHT: 73 IN | TEMPERATURE: 97.9 F | WEIGHT: 205 LBS | RESPIRATION RATE: 18 BRPM | OXYGEN SATURATION: 97 % | SYSTOLIC BLOOD PRESSURE: 139 MMHG | HEART RATE: 95 BPM | DIASTOLIC BLOOD PRESSURE: 88 MMHG

## 2023-08-19 DIAGNOSIS — R23.8 VESICLES: ICD-10-CM

## 2023-08-19 DIAGNOSIS — L03.115 CELLULITIS OF RIGHT LOWER EXTREMITY: Primary | ICD-10-CM

## 2023-08-19 PROCEDURE — 99283 EMERGENCY DEPT VISIT LOW MDM: CPT

## 2023-08-19 RX ORDER — HYDROCODONE BITARTRATE AND ACETAMINOPHEN 5; 325 MG/1; MG/1
1 TABLET ORAL ONCE
Status: COMPLETED | OUTPATIENT
Start: 2023-08-19 | End: 2023-08-19

## 2023-08-19 RX ORDER — SACCHAROMYCES BOULARDII 250 MG
250 CAPSULE ORAL 2 TIMES DAILY
Qty: 28 CAPSULE | Refills: 0 | Status: SHIPPED | OUTPATIENT
Start: 2023-08-19 | End: 2023-09-02

## 2023-08-19 RX ORDER — HYDROCODONE BITARTRATE AND ACETAMINOPHEN 5; 325 MG/1; MG/1
1 TABLET ORAL EVERY 6 HOURS PRN
Qty: 15 TABLET | Refills: 0 | Status: SHIPPED | OUTPATIENT
Start: 2023-08-19

## 2023-08-19 RX ORDER — AMOXICILLIN AND CLAVULANATE POTASSIUM 875; 125 MG/1; MG/1
1 TABLET, FILM COATED ORAL EVERY 12 HOURS
Qty: 20 TABLET | Refills: 0 | Status: SHIPPED | OUTPATIENT
Start: 2023-08-19 | End: 2023-08-29

## 2023-08-19 RX ORDER — AMOXICILLIN AND CLAVULANATE POTASSIUM 875; 125 MG/1; MG/1
1 TABLET, FILM COATED ORAL ONCE
Status: COMPLETED | OUTPATIENT
Start: 2023-08-19 | End: 2023-08-19

## 2023-08-19 RX ADMIN — AMOXICILLIN AND CLAVULANATE POTASSIUM 1 TABLET: 875; 125 TABLET, FILM COATED ORAL at 15:24

## 2023-08-19 RX ADMIN — HYDROCODONE BITARTRATE AND ACETAMINOPHEN 1 TABLET: 5; 325 TABLET ORAL at 15:24

## 2023-08-19 NOTE — ED PROVIDER NOTES
Subjective   History of Present Illness  Seth Manuel is a 62-year-old white male who presents secondary to pain, swelling and redness right foot and lower leg.  Patient was seen in this ED on Monday, 8/14/2023 for pain in the patient's upper calf.  Patient this past Sunday he had taken a nap.  Afterwards he was watching a ball game.  When he stood up he had pain posterior aspect of his right calf.  Patient was concerned about possible DVT.  Thus he was seen here in this ED.  Ultrasound was negative for DVT.  It did show evidence of a Baker's cyst.  Patient was told to wear compression stockings by his PCP.  Patient wore stocking and compression knee brace for approximately 12 hours.  During this time patient developed blisters and bruising of his right foot.  He discontinued the compression stocking and knee brace.  However he continues to have pain, swelling and redness in the foot and posterior aspect of lower leg.  This is worsened with weightbearing.  No fever but patient did experience chills earlier today.  Patient elected to come to the ED today for evaluation.    History provided by:  Patient    Review of Systems   Constitutional:  Negative for fever.   HENT:  Negative for rhinorrhea.    Eyes:  Negative for redness.   Respiratory:  Negative for cough.    Cardiovascular:  Negative for chest pain.   Gastrointestinal:  Negative for abdominal pain.   Genitourinary:  Negative for dysuria.   Musculoskeletal:  Negative for back pain.        Right lower extremity issues as described in HPI.  Patient has an ACL tear left knee which has never been repaired.   Skin:  Negative for rash.   Neurological:  Negative for syncope.   All other systems reviewed and are negative.    Past Medical History:   Diagnosis Date    Allergic rhinitis 10/27/2017    Arthritis     Coronary artery disease     As noted in medical records    Diabetes mellitus     Erectile dysfunction     Hyperlipidemia     Hypertension     Myocardial infarction  2014    Sleep apnea     has a C-Pap but doesn't use       Allergies   Allergen Reactions    Crestor [Rosuvastatin Calcium] Myalgia    Lipitor [Atorvastatin] Myalgia       Past Surgical History:   Procedure Laterality Date    BICEPS TENDON REPAIR      CORONARY STENT PLACEMENT      Medtronic Resolute stent into LAD    HERNIA REPAIR      Umbilical     TONSILLECTOMY      VASECTOMY      As noted in medical records       Family History   Problem Relation Age of Onset    Hypertension Mother     Diabetes Mother     Graves' disease Mother     Rheum arthritis Mother     Heart attack Mother 50    Hypertension Father     Cancer Father         Throat    Stroke Father 76    Heart attack Father 50    Diabetes Brother     Lung cancer Paternal Uncle     Rheum arthritis Maternal Grandmother     Heart disease Maternal Grandfather     Diabetes Paternal Grandmother     Heart disease Paternal Grandfather        Social History     Socioeconomic History    Marital status:    Tobacco Use    Smoking status: Former     Packs/day: 1.00     Years: 40.00     Pack years: 40.00     Types: Cigarettes     Start date: 1974     Quit date: 2015     Years since quittin.6    Smokeless tobacco: Never   Substance and Sexual Activity    Alcohol use: Yes     Alcohol/week: 6.0 standard drinks     Types: 6 Cans of beer per week     Comment: beer 2-3 days/week    Drug use: No    Sexual activity: Defer     Partners: Female     Birth control/protection: None           Objective   Physical Exam  Vitals and nursing note reviewed.   Constitutional:       General: He is not in acute distress.     Appearance: Normal appearance. He is normal weight. He is not ill-appearing, toxic-appearing or diaphoretic.      Comments: 62-year-old white male sitting in reclining chair with feet elevated.  Patient appears in good overall health for age.  Vital signs unremarkable.  Patient is friendly cooperative.  Spouse is at bedside.       HENT:       Head: Normocephalic and atraumatic.   Pulmonary:      Effort: Pulmonary effort is normal.   Musculoskeletal:         General: Swelling and tenderness present. No deformity or signs of injury.      Right lower leg: Swelling and tenderness present. No deformity or bony tenderness. Edema present.      Right ankle: Swelling present. Tenderness present. Normal range of motion.      Right foot: Normal capillary refill. Swelling, tenderness and bony tenderness present. No deformity or crepitus.        Legs:         Feet:    Skin:     General: Skin is warm and dry.      Capillary Refill: Capillary refill takes less than 2 seconds.      Findings: Erythema present.   Neurological:      General: No focal deficit present.      Mental Status: He is alert and oriented to person, place, and time.   Psychiatric:         Mood and Affect: Mood normal.         Behavior: Behavior normal.       Procedures           ED Course  ED Course as of 08/19/23 1523   Sat Aug 19, 2023   1512 Patient has cellulitis right foot and lower leg as well as denuded vesicles dorsal aspect of right foot.  Contusion also present from compression stocking and knee brace.  Starting oral antibiotics.  Discussed with patient and his spouse diagnoses, treatment, indications return to the ED and follow-up.  Giving antibiotics and pain medication here in the ED.  Will prescribe the same for home.  Patient has a PCP.  Also giving follow-up with orthopedist for Baker's cyst.    Prescription  1-Augmentin  2-hydrocodone/acetaminophen [SS]      ED Course User Index  [SS] Obdulio Rivas MD      My differential diagnosis for rash includes but is not limited to allergic reaction, hives, urticaria, anaphylactoid reaction, anaphylaxis, erythema multiforme, erythema nodosum, erythema infectiosum (Fifths disease), drug rash, contact dermatitis, soft tissue infection, cellulitis, abscess, impetigo, eczema, psoriasis, hidradenitis superlative, meningococcemia, sepsis,  toxic shock syndrome, Anna spotted fever, Lyme disease, disseminated gonococcemia, syphilis, scarlet fever, scarlatina, chickenpox, herpes zoster, viral exanthem, pityriasis rosea, scabies, bedbugs and allergic reaction.                                         Medical Decision Making  Risk  Prescription drug management.        Final diagnoses:   Cellulitis of right lower extremity   Vesicles       ED Disposition  ED Disposition       ED Disposition   Discharge    Condition   Stable    Comment   --               Joselyn Ross, APRN  [address] [facility] [address]  [facility] [id number] [phone]    Schedule an appointment as soon as possible for a visit in 2 days  For wound re-check, sooner for signs of worsening infection    Luís Conley MD  [address] [facility] [address]  [facility] [id number] [phone]    Schedule an appointment as soon as possible for a visit in 1 week  For Baker's cyst.  Sooner if needed.         Medication List        New Prescriptions      amoxicillin-clavulanate 875-125 MG per tablet  Commonly known as: AUGMENTIN  Take 1 tablet by mouth Every 12 (Twelve) Hours for 10 days.     HYDROcodone-acetaminophen 5-325 MG per tablet  Commonly known as: NORCO  Take 1 tablet by mouth Every 6 (Six) Hours As Needed for Moderate Pain or Severe Pain (2 tabs if needed) for up to 15 doses.     saccharomyces boulardii 250 MG capsule  Commonly known as: FLORASTOR  Take 1 capsule by mouth 2 (Two) Times a Day for 14 days.               Where to Get Your Medications        These medications were sent to [facility] [id number] - [place] - [address] [facility] [address] - [phone] [facility] [phone]   [address] [address]      Phone: [phone]   amoxicillin-clavulanate 875-125 MG per tablet  HYDROcodone-acetaminophen 5-325 MG per tablet  saccharomyces boulardii 250 MG capsule            Obdulio Rivas MD  08/19/23 [id number]

## 2023-08-19 NOTE — DISCHARGE INSTRUCTIONS
You have cellulitis or an infection of the skin.  Medication as directed.  Elevate foot to heart level is much as possible.  Wash wound 3 times daily with antibacterial soap.  Pat dry and apply bacitracin or Neosporin.  Continue to healed.  Do not wear compression stockings in the future.  Follow-up with your PCP as above.  Follow-up with orthopedics as above.  Return to ED for worsening symptoms, medical emergencies.

## 2023-08-19 NOTE — Clinical Note
ZOHRA FORMAN  [facility] EMERGENCY DEPARTMENT  [address] [place] [address]  Phone: [phone]    Seth Manuel was seen and treated in our emergency department on 8/19/2023.  He may return to work on 08/24/2023.         Thank you for choosing [facility].    Obdulio Rivas MD

## 2023-08-21 ENCOUNTER — OFFICE VISIT (OUTPATIENT)
Dept: INTERNAL MEDICINE | Facility: CLINIC | Age: 63
End: 2023-08-21
Payer: COMMERCIAL

## 2023-08-21 VITALS
SYSTOLIC BLOOD PRESSURE: 118 MMHG | HEART RATE: 87 BPM | WEIGHT: 211.6 LBS | OXYGEN SATURATION: 98 % | HEIGHT: 73 IN | BODY MASS INDEX: 28.04 KG/M2 | DIASTOLIC BLOOD PRESSURE: 70 MMHG | TEMPERATURE: 97.8 F

## 2023-08-21 DIAGNOSIS — M71.21 BAKER'S CYST OF KNEE, RIGHT: ICD-10-CM

## 2023-08-21 DIAGNOSIS — L03.115 CELLULITIS OF RIGHT LOWER EXTREMITY: Primary | ICD-10-CM

## 2023-08-21 PROCEDURE — 99214 OFFICE O/P EST MOD 30 MIN: CPT | Performed by: NURSE PRACTITIONER

## 2023-08-21 NOTE — PROGRESS NOTES
Subjective   Seth Manuel is a 62 y.o. male presenting today for follow up of   Chief Complaint   Patient presents with    Hospital Follow Up Visit     8/14 and 8/19 - cyst in right leg, then treated for cellulitis of lower right leg.        History of Present Illness     Outpatient Medications Marked as Taking for the 8/21/23 encounter (Office Visit) with Joselyn Ross APRN   Medication Sig Dispense Refill    amoxicillin-clavulanate (AUGMENTIN) 875-125 MG per tablet Take 1 tablet by mouth Every 12 (Twelve) Hours for 10 days. 20 tablet 0    Ascorbic Acid (VITAMIN C PO) Take 3,000 Units by mouth.      aspirin 81 MG EC tablet Take 1 tablet by mouth Daily.      carvedilol (COREG) 3.125 MG tablet TAKE 1 TABLET BY MOUTH TWICE  DAILY WITH MEALS 180 tablet 3    Coenzyme Q10 (COQ10 PO) Take 250 mg by mouth 2 (Two) Times a Day.      famotidine (PEPCID) 40 MG tablet TAKE 1 TABLET BY MOUTH  DAILY 90 tablet 3    HYDROcodone-acetaminophen (NORCO) 5-325 MG per tablet Take 1 tablet by mouth Every 6 (Six) Hours As Needed for Moderate Pain or Severe Pain (2 tabs if needed) for up to 15 doses. 15 tablet 0    KRILL OIL PO Take  by mouth.      Livalo 2 MG tablet tablet TAKE 1 TABLET BY MOUTH AT NIGHT 90 tablet 3    melatonin 3 MG tablet Take 20 mg by mouth Every Night.      metFORMIN (GLUCOPHAGE) 500 MG tablet TAKE 1 TABLET BY MOUTH TWICE  DAILY 180 tablet 3    montelukast (SINGULAIR) 10 MG tablet TAKE 1 TABLET BY MOUTH AT  NIGHT 90 tablet 3    omega-3 acid ethyl esters (LOVAZA) 1 g capsule TAKE 2 CAPSULES BY MOUTH  TWICE DAILY 360 capsule 3    saccharomyces boulardii (FLORASTOR) 250 MG capsule Take 1 capsule by mouth 2 (Two) Times a Day for 14 days. 28 capsule 0    tadalafil (CIALIS) 5 MG tablet TAKE ONE TABLET BY MOUTH DAILY 10 tablet 0    traZODone (DESYREL) 50 MG tablet Take 1-2 tablets by mouth every night at bedtime. 10 tablet 0       ED with Ken Chavez MD (08/14/2023)   Patient states that approximate 6 PM last  night he noticed significant swelling and pain to his right lower extremity after watching a possible time. He states that he has not had any recent travel other than a 4-hour drive proximally 1 to 1.5 weeks ago. Has not had any known injury to the area. Denies any fever, chest pain, shortness of breath. Has no other complaints at this time.        Details: Labs resulted, slight white count elevation of 12, given no fever, tachycardia, or any other associated signs for infectious process suspicion for acute infectious process.  Radiology: ordered.     Details: Ultrasound negative for acute DVT, popliteal cyst present large complex         spoke with patient regarding results of labs and imaging and plan for discharge with PCP and orthopedic surgery follow-up given large complex popliteal cyst.  Patient struck take Tylenol and ibuprofen for pain control.  Patient understands and agrees with plan of care.  Offered patient crutches, walker, or cane on discharge, declined at this time stating that he would know with these when he goes to work this evening.  [facility], patient ambulatory at time of discharge.    ED with Obdulio Rivas MD (08/19/2023)   History of Present Illness  Seth Manuel is a 62-year-old white male who presents secondary to pain, swelling and redness right foot and lower leg.  Patient was seen in this ED on Monday, 8/14/2023 for pain in the patient's upper calf.  Patient this past Sunday he had taken a nap.  Afterwards he was watching a ball game.  When he stood up he had pain posterior aspect of his right calf.  Patient was concerned about possible DVT.  Thus he was seen here in this ED.  Ultrasound was negative for DVT.  It did show evidence of a Baker's cyst.  Patient was told to wear compression stockings by his PCP.  Patient wore stocking and compression knee brace for approximately 12 hours.  During this time patient developed  "blisters and bruising of his right foot.  He discontinued the compression stocking and knee brace.  However he continues to have pain, swelling and redness in the foot and posterior aspect of lower leg.  This is worsened with weightbearing.  No fever but patient did experience chills earlier today.  Patient elected to come to the ED today for evaluation.    ED Course as of 08/19/23 1523   Sat Aug 19, 2023   1512 Patient has cellulitis right foot and lower leg as well as denuded vesicles dorsal aspect of right foot.  Contusion also present from compression stocking and knee brace.  Starting oral antibiotics.  Discussed with patient and his spouse diagnoses, treatment, indications return to the ED and follow-up.  Giving antibiotics and pain medication here in the ED.  Will prescribe the same for home.  Patient has a PCP.  Also giving follow-up with orthopedist for Baker's cyst.     Prescription  1-Augmentin  2-hydrocodone/acetaminophen [SS]       Pt reports compliance w/ abx and gradual improvement.      The following portions of the patient's history were reviewed and updated as appropriate: allergies, current medications, past family history, past medical history, past social history, past surgical history and problem list.    Review of Systems   Constitutional:  Negative for fever.     Objective   Vitals:    08/21/23 1316   BP: 118/70   BP Location: Left arm   Patient Position: Sitting   Cuff Size: Adult   Pulse: 87   Temp: 97.8 øF (36.6 øC)   TempSrc: Infrared   SpO2: 98%   Weight: 96 kg (211 lb 9.6 oz)   Height: 185.4 cm (73\")       BP Readings from Last 3 Encounters:   08/21/23 118/70   08/19/23 139/88   08/14/23 134/84        Wt Readings from Last 3 Encounters:   08/21/23 96 kg (211 lb 9.6 oz)   08/19/23 93 kg (205 lb)   08/14/23 91.2 kg (201 lb)        Body mass index is 27.92 kg/mý.  Nursing notes and vitals reviewed.    Physical Exam  Constitutional:       General: He is not in acute distress.     " Appearance: He is well-developed.   Pulmonary:      Effort: Pulmonary effort is normal. No respiratory distress.   Musculoskeletal:      Right lower le+ Edema present.   Skin:     General: Skin is warm.      Findings: Ecchymosis (R LE from the knee to the toes) present.          Neurological:      Mental Status: He is alert and oriented to person, place, and time.   Psychiatric:         Speech: Speech normal.         Thought Content: Thought content normal.       Recent Results (from the past 672 hour(s))   Comprehensive Metabolic Panel    Collection Time: 23 12:00 PM    Specimen: Blood   Result Value Ref Range    Glucose 117 (H) 65 - 99 mg/dL    BUN 10 8 - 23 mg/dL    Creatinine 0.89 0.76 - 1.27 mg/dL    Sodium 130 (L) 136 - 145 mmol/L    Potassium 4.5 3.5 - 5.2 mmol/L    Chloride 93 (L) 98 - 107 mmol/L    CO2 25.8 22.0 - 29.0 mmol/L    Calcium 9.1 8.6 - 10.5 mg/dL    Total Protein 6.8 6.0 - 8.5 g/dL    Albumin 4.5 3.5 - 5.2 g/dL    ALT (SGPT) 18 1 - 41 U/L    AST (SGOT) 20 1 - 40 U/L    Alkaline Phosphatase 56 39 - 117 U/L    Total Bilirubin 0.3 0.0 - 1.2 mg/dL    Globulin 2.3 gm/dL    A/G Ratio 2.0 g/dL    BUN/Creatinine Ratio 11.2 7.0 - 25.0    Anion Gap 11.2 5.0 - 15.0 mmol/L    eGFR 96.9 >60.0 mL/min/1.73   Protime-INR    Collection Time: 23 12:00 PM    Specimen: Blood   Result Value Ref Range    Protime 13.8 12.1 - 15.0 Seconds    INR 1.06 0.90 - 1.10   aPTT    Collection Time: 23 12:00 PM    Specimen: Blood   Result Value Ref Range    PTT 29.3 24.3 - 38.1 seconds   CBC Auto Differential    Collection Time: 23 12:00 PM    Specimen: Blood   Result Value Ref Range    WBC 12.59 (H) 3.40 - 10.80 10*3/mm3    RBC 4.67 4.14 - 5.80 10*6/mm3    Hemoglobin 14.3 13.0 - 17.7 g/dL    Hematocrit 43.3 37.5 - 51.0 %    MCV 92.7 79.0 - 97.0 fL    MCH 30.6 26.6 - 33.0 pg    MCHC 33.0 31.5 - 35.7 g/dL    RDW 13.2 12.3 - 15.4 %    RDW-SD 45.7 37.0 - 54.0 fl    MPV 8.0 6.0 - 12.0 fL    Platelets 254  140 - 450 10*3/mm3    Neutrophil % 77.3 (H) 42.7 - 76.0 %    Lymphocyte % 10.6 (L) 19.6 - 45.3 %    Monocyte % 10.6 5.0 - 12.0 %    Eosinophil % 0.8 0.3 - 6.2 %    Basophil % 0.3 0.0 - 1.5 %    Immature Grans % 0.4 0.0 - 0.5 %    Neutrophils, Absolute 9.73 (H) 1.70 - 7.00 10*3/mm3    Lymphocytes, Absolute 1.34 0.70 - 3.10 10*3/mm3    Monocytes, Absolute 1.33 (H) 0.10 - 0.90 10*3/mm3    Eosinophils, Absolute 0.10 0.00 - 0.40 10*3/mm3    Basophils, Absolute 0.04 0.00 - 0.20 10*3/mm3    Immature Grans, Absolute 0.05 0.00 - 0.05 10*3/mm3       US Venous Doppler Lower Extremity Right (duplex)    Result Date: 8/14/2023  Negative examination.  No evidence of right lower extremity DVT.  This report was finalized on 8/14/2023 12:46 PM by Dr. Koko Talbot MD.         Assessment & Plan   Diagnoses and all orders for this visit:    1. Cellulitis of right lower extremity (Primary)  -     mupirocin (BACTROBAN) 2 % ointment; Apply 1 application  topically to the appropriate area as directed 3 (Three) Times a Day for 10 days.  Dispense: 30 g; Refill: 0  - Discussed bandage barrier between lesions and footwear  - Finish Augmentin    2. Baker's cyst of knee, right   - has appt w/ Ortho tomorrow            Medications, including side effects, were discussed with the patient. Patient verbalized understanding.  The plan of care was discussed. All questions were answered. Patient verbalized understanding.      Return if symptoms worsen or fail to improve.

## 2023-08-22 ENCOUNTER — OFFICE VISIT (OUTPATIENT)
Dept: ORTHOPEDIC SURGERY | Facility: CLINIC | Age: 63
End: 2023-08-22
Payer: COMMERCIAL

## 2023-08-22 ENCOUNTER — HOSPITAL ENCOUNTER (OUTPATIENT)
Dept: MRI IMAGING | Facility: HOSPITAL | Age: 63
Discharge: HOME | End: 2023-08-22
Admitting: INTERNAL MEDICINE
Payer: COMMERCIAL

## 2023-08-22 ENCOUNTER — PATIENT ROUNDING (BHMG ONLY) (OUTPATIENT)
Dept: ORTHOPEDIC SURGERY | Facility: CLINIC | Age: 63
End: 2023-08-22
Payer: COMMERCIAL

## 2023-08-22 VITALS
SYSTOLIC BLOOD PRESSURE: 132 MMHG | HEIGHT: 73 IN | HEART RATE: 96 BPM | BODY MASS INDEX: 27.83 KG/M2 | WEIGHT: 210 LBS | DIASTOLIC BLOOD PRESSURE: 85 MMHG

## 2023-08-22 DIAGNOSIS — M71.21 BAKER'S CYST OF KNEE, RIGHT: ICD-10-CM

## 2023-08-22 DIAGNOSIS — M25.561 ACUTE PAIN OF RIGHT KNEE: ICD-10-CM

## 2023-08-22 DIAGNOSIS — M25.561 ACUTE PAIN OF RIGHT KNEE: Primary | ICD-10-CM

## 2023-08-22 PROCEDURE — 73721 MRI JNT OF LWR EXTRE W/O DYE: CPT

## 2023-08-22 NOTE — PROGRESS NOTES
Subjective:     Patient ID: Seth Manuel is a 62 y.o. male.    Chief Complaint:    History of Present Illness  Seth Manuel presents to clinic today for evaluation of severe right lower extremity swelling.  The patient states that he is not sure exactly what happened he denies any specific injury but he does have bruising and some significant swelling.  He states that he has been in the ER twice and the swelling is gotten so severe that he started to develop blistering on the dorsum of his foot and the posterior aspect of his calcaneus.  He had a duplex ultrasound which was negative for DVT but did show a large popliteal Baker's cyst.  He was given antibiotics by the emergency room and told to wear a compressive wrap.  He states that the swelling has gotten a little bit better but overall is nowhere near the contralateral side.     Social History     Occupational History    Not on file   Tobacco Use    Smoking status: Former     Packs/day: 1.00     Years: 40.00     Pack years: 40.00     Types: Cigarettes     Start date: 1974     Quit date: 2015     Years since quittin.6     Passive exposure: Past    Smokeless tobacco: Never   Vaping Use    Vaping Use: Never used   Substance and Sexual Activity    Alcohol use: Yes     Alcohol/week: 6.0 standard drinks     Types: 6 Cans of beer per week     Comment: beer 2-3 days/week    Drug use: No    Sexual activity: Defer     Partners: Female     Birth control/protection: None      Past Medical History:   Diagnosis Date    Allergic rhinitis 10/27/2017    Arthritis     Coronary artery disease     As noted in medical records    Diabetes mellitus     Erectile dysfunction     Hyperlipidemia     Hypertension     Myocardial infarction 2014    Sleep apnea     has a C-Pap but doesn't use     Past Surgical History:   Procedure Laterality Date    BICEPS TENDON REPAIR      CORONARY STENT PLACEMENT      Medtronic Resolute stent into LAD    HERNIA REPAIR       "Umbilical     TONSILLECTOMY      VASECTOMY      As noted in medical records       Family History   Problem Relation Age of Onset    Hypertension Mother     Diabetes Mother     Graves' disease Mother     Rheum arthritis Mother     Heart attack Mother 50    Hypertension Father     Cancer Father         Throat    Stroke Father 76    Heart attack Father 50    Diabetes Brother     Lung cancer Paternal Uncle     Rheum arthritis Maternal Grandmother     Heart disease Maternal Grandfather     Diabetes Paternal Grandmother     Heart disease Paternal Grandfather                  Objective:  Vitals:    23 0908   BP: 132/85   Pulse: 96   Weight: 95.3 kg (210 lb)   Height: 185.4 cm (73\")         23  0908   Weight: 95.3 kg (210 lb)     Body mass index is 27.71 kg/mý.        Right Knee Exam     Tenderness   Right knee tenderness location: posteriorly.    Range of Motion   Extension:  0   Flexion:  120     Tests   Tye:  Medial - negative Lateral - negative  Varus: negative Valgus: negative  Lachman:  Anterior - negative    Posterior - negative  Drawer:  Anterior - negative    Posterior - negative    Other   Erythema: absent  Scars: absent  Sensation: normal  Pulse: present  Swelling: moderate  Effusion: no effusion present             Imagin views of the right knee taken on 2023 reviewed by myself in the office today  Indication: Right knee pain and lower extremity swelling  Findings: X-rays demonstrate no acute osseous abnormality.  There is minimal degenerative changes in all 3 compartments.  No signs of fracture dislocation or subluxation  Comparative studies: None      Duplex ultrasound taken on 2023 was reviewed by myself in the office today  No signs of DVT but there is a large popliteal Baker's cyst.      Result Date: 2023  Ordering physician's impression is located in the Encounter Note dated 23.     US Venous Doppler Lower Extremity Right (duplex)    Result Date: " 8/14/2023  Negative examination.  No evidence of right lower extremity DVT.  This report was finalized on 8/14/2023 12:46 PM by Dr. Koko Talbot MD.       Assessment:        1. Acute pain of right knee    2. Baker's cyst of knee, right           Plan:          Discussed treatment options at length with patient at today's visit.  I discussed with the patient that he has significant right lower extremity swelling.  The bruising is concerning for some sort of knee intra-articular pathology or nondisplaced fracture.  Additionally the lower extremity swelling could be attributable to to venous congestion or from compression due to his large Baker's cyst.  I think it is imperative that we order an MRI of his right knee in order to evaluate for compression of the posterior neurovascular structures as well as other intra-articular knee pathologies.  The MRI was ordered and I will call the patient with results.  From my standpoint he may remain weightbearing as tolerated.  He should continue taking the antibiotics prescribed by the ER for the blisters on the dorsum of his foot.  He states that the blisters are from his boots getting too tight when he was at work.  Follow up: After MRI      Seth Manuel was in agreement with plan and had all questions answered.     Medications:  No orders of the defined types were placed in this encounter.      Followup:  No follow-ups on file.    Diagnoses and all orders for this visit:    1. Acute pain of right knee (Primary)  -     XR Knee 4+ View Right  -     MRI Knee Right Without Contrast; Future    2. Baker's cyst of knee, right  -     MRI Knee Right Without Contrast; Future          Dictated utilizing Dragon dictation

## 2023-08-22 NOTE — PROGRESS NOTES
August 22, 2023    Hello, may I speak with Seth Manuel?    My name is MIKE      I am  with [facility]  [address]  [place] [address] [phone].    Before we get started may I verify your date of birth? 1960    I am calling to officially welcome you to our practice and ask about your recent visit. Is this a good time to talk? yes    Tell me about your visit with us. What things went well?  EVERYTHING WAS FINE.        We're always looking for ways to make our patients' experiences even better. Do you have recommendations on ways we may improve?  no    Overall were you satisfied with your first visit to our practice? yes       I appreciate you taking the time to speak with me today. Is there anything else I can do for you? no      Thank you, and have a great day.

## 2023-08-23 ENCOUNTER — TELEPHONE (OUTPATIENT)
Dept: ORTHOPEDIC SURGERY | Facility: CLINIC | Age: 63
End: 2023-08-23
Payer: COMMERCIAL

## 2023-08-23 DIAGNOSIS — M25.561 ACUTE PAIN OF RIGHT KNEE: Primary | ICD-10-CM

## 2023-08-23 DIAGNOSIS — R22.41 LOWER LEG MASS, RIGHT: ICD-10-CM

## 2023-08-23 NOTE — TELEPHONE ENCOUNTER
Patient advised of MRI results R knee- per Dr. Conley patient should remain off work until seen back 08.29.2023 @ 8:30am - letter written and available in my chart.    Patient advised that Dr. Conley did put in for a STAT MRI of the tib/fib for further evaluation- patient agreeable and will await a call from our office once this is scheduled.

## 2023-08-23 NOTE — TELEPHONE ENCOUNTER
Patient calling for the results of his STAT MRI- he is also asking about his work status.    MRI Knee Right Without Contrast    Result Date: 8/23/2023 8/22/2023     HISTORY: Injury to knee 3 months ago and felt a pop. Complains of upper and posterior calf pain with swelling x1 week.  TECHNIQUE: Multiplanar multiecho imaging of the right knee utilizing a dedicated protocol.  FINDINGS: Normal osseous alignment. No bone contusion or occult fracture. Edema and cystic change at the tibial eminence corresponding to the ACL tendon insertion compatible with enthesopathic marrow changes. Diffuse thickening and increased signal within the ACL compatible with mucoid degeneration. Posterior cruciate ligament appears intact. 1.6 x 2 cm focus of moderate to high-grade chondromalacia lateral femoral trochlea. Mild chondromalacia medial patellar facet. Minimal chondrosis medial and lateral femoral tibial compartments.  Diffuse myxoid degeneration medial meniscus with a developing radial tear root attachment posterior horn measuring 5 mm in width and involving the inner two thirds of the meniscus. 5 mm of medial meniscal extrusion. Lateral meniscus demonstrates diffuse myxoid degeneration.  Medial collateral ligament and lateral collateral ligament complex appears intact. Extensor mechanism unremarkable. Generalized soft tissue swelling and edema about the knee and extending into the lower extremity. Large complex partially visualized fluid mass along the posterior medial aspect of the knee extending from just above the joint line into the lower leg and predominantly along the margin of the medial head of the gastrocnemius muscle. The full extent of the lesion is not demonstrated as it extends into the lower leg. Largest component measures 4 x 9.4 cm in greatest transverse dimensions and measures over 15 cm cephalocaudal. The more proximal components is predominantly cystic with a thick wall with some synovial debris. More  inferiorly this demonstrates complex nature with T1 hyperintensity and is most compatible with a large gastrocnemius epimysial hematoma or less likely dissecting hemorrhagic popliteal cyst. The remainder of the visualized lower leg musculature appears normal.      Diffuse myxoid degeneration medial meniscus with a radial tear at the root attachment as detailed above. Medial meniscal extrusion may reflect loss of hoop stress.  Large focus of moderate to high-grade chondromalacia of the lateral femoral trochlea with a small amount of subchondral edema.  Large complex fluid collection posterior medial lower leg and posterior knee. The proximal component represents a complex popliteal cyst with a thickened wall and some synovial debris but inferiorly features most compatible with a epimysial medial head gastrocnemius hematoma producing mass effect on the medial head. The full extent not completely imaged but estimated 4 x 9.4 by greater than 15 cm. There is associated soft tissue edema.  This report was finalized on 8/23/2023 8:14 AM by Dr. ROSALINDA Manuel MD.      Please advise.

## 2023-08-23 NOTE — TELEPHONE ENCOUNTER
Patient calling again for MRI results and asking for a plan of care, did advise patient that Dr. Conley was in surgery and someone would reach out to the patient as soon as we can.    Thanks.

## 2023-08-24 ENCOUNTER — HOSPITAL ENCOUNTER (OUTPATIENT)
Dept: MRI IMAGING | Facility: HOSPITAL | Age: 63
Discharge: HOME | End: 2023-08-24
Admitting: INTERNAL MEDICINE
Payer: COMMERCIAL

## 2023-08-24 DIAGNOSIS — R22.41 LOWER LEG MASS, RIGHT: ICD-10-CM

## 2023-08-24 DIAGNOSIS — M25.561 ACUTE PAIN OF RIGHT KNEE: ICD-10-CM

## 2023-08-24 PROCEDURE — 73718 MRI LOWER EXTREMITY W/O DYE: CPT

## 2023-08-29 ENCOUNTER — OFFICE VISIT (OUTPATIENT)
Dept: ORTHOPEDIC SURGERY | Facility: CLINIC | Age: 63
End: 2023-08-29
Payer: COMMERCIAL

## 2023-08-29 VITALS — WEIGHT: 210 LBS | BODY MASS INDEX: 27.83 KG/M2 | HEIGHT: 73 IN

## 2023-08-29 DIAGNOSIS — S80.11XA HEMATOMA OF RIGHT LOWER LEG: Primary | ICD-10-CM

## 2023-08-29 NOTE — PROGRESS NOTES
Subjective:     Patient ID: Seth Manuel is a 62 y.o. male.    Chief Complaint:    History of Present Illness  Seth Manuel returns to clinic today for evaluation of right calf significant swelling and pain.  Patient states that it came up in a couple of hours when he was working over the weekend.  He states that he was seen in the ER and evaluated for blood clot which was negative.  I ordered an MRI when I saw the patient last week of his knee which incompletely demonstrated a large hematoma in the posterior medial aspect of his right lower leg.  A stat MRI of his right tib-fib was ordered and he returns today for further evaluation and management.  He states that the pain and swelling has gotten better and overall he thinks he is improving.  He would like to return to work.     Social History     Occupational History    Not on file   Tobacco Use    Smoking status: Former     Packs/day: 1.00     Years: 40.00     Pack years: 40.00     Types: Cigarettes     Start date: 1974     Quit date: 2015     Years since quittin.6     Passive exposure: Past    Smokeless tobacco: Never   Vaping Use    Vaping Use: Never used   Substance and Sexual Activity    Alcohol use: Yes     Alcohol/week: 6.0 standard drinks     Types: 6 Cans of beer per week     Comment: beer 2-3 days/week    Drug use: No    Sexual activity: Defer     Partners: Female     Birth control/protection: None      Past Medical History:   Diagnosis Date    Allergic rhinitis 10/27/2017    Arthritis     Coronary artery disease     As noted in medical records    Diabetes mellitus     Erectile dysfunction     Hyperlipidemia     Hypertension     Myocardial infarction 2014    Sleep apnea     has a C-Pap but doesn't use     Past Surgical History:   Procedure Laterality Date    BICEPS TENDON REPAIR      CORONARY STENT PLACEMENT      Medtronic Resolute stent into LAD    HERNIA REPAIR      Umbilical     TONSILLECTOMY      VASECTOMY      As noted  "in medical records       Family History   Problem Relation Age of Onset    Hypertension Mother     Diabetes Mother     Graves' disease Mother     Rheum arthritis Mother     Heart attack Mother 50    Hypertension Father     Cancer Father         Throat    Stroke Father 76    Heart attack Father 50    Diabetes Brother     Lung cancer Paternal Uncle     Rheum arthritis Maternal Grandmother     Heart disease Maternal Grandfather     Diabetes Paternal Grandmother     Heart disease Paternal Grandfather                  Objective:  Vitals:    08/29/23 0807   Weight: 95.3 kg (210 lb)   Height: 185.4 cm (73\")         08/29/23  0807   Weight: 95.3 kg (210 lb)     Body mass index is 27.71 kg/mý.        Right Ankle Exam     Tenderness   Right ankle tenderness location: posterior gastroc.  Swelling: moderate    Range of Motion   Dorsiflexion:  normal   Plantar flexion:  normal   Eversion:  normal   Inversion:  normal     Muscle Strength   Dorsiflexion:  5/5  Plantar flexion:  5/5  Anterior tibial:  5/5  Posterior tibial:  5/5  Gastrocsoleus:  5/5  Peroneal muscle:  5/5    Other   Erythema: absent  Scars: absent  Sensation: normal  Pulse: present     Comments:  Moderate posterior medial swelling.  No signs of acute apartment syndrome.  Sensation normal no pain with motion 2+ pulses             Imaging: MRI of the tib-fib and right knee on 8/24 and 8/23 respectively reviewed myself in the office today  MRI demonstrates moderate osteoarthritis in the medial compartment of the right knee with a small tear of the medial meniscus.  There is a popliteal cyst likely arising from the posterior medial aspect of the knee.  The patient has a large fluid collection in the posterior medial aspect of the lower leg with compression of the medial head of the gastroc.  This likely represents hematoma.    MRI Tibia Fibula Right Without Contrast    Result Date: 8/24/2023   1. Complex fluid collection along the superficial fascial plane of the " proximal medial head gastrocnemius, detailed above. The MR appearance is most suggestive of hematoma. There is also evidence of a complex popliteal cyst which may also contain blood products and partially imaged right knee hemarthrosis. It is unclear whether the right leg hematoma is separate from or communicates with the complex popliteal cyst. Correlation for any history of anticoagulation treatment is recommended. 2. Suspected low-grade strain of the medial head gastrocnemius. 3. No acute osseous abnormalities.  This report was finalized on 8/24/2023 11:03 AM by Dr. Sen Morales MD.      MRI Knee Right Without Contrast    Result Date: 8/23/2023  Diffuse myxoid degeneration medial meniscus with a radial tear at the root attachment as detailed above. Medial meniscal extrusion may reflect loss of hoop stress.  Large focus of moderate to high-grade chondromalacia of the lateral femoral trochlea with a small amount of subchondral edema.  Large complex fluid collection posterior medial lower leg and posterior knee. The proximal component represents a complex popliteal cyst with a thickened wall and some synovial debris but inferiorly features most compatible with a epimysial medial head gastrocnemius hematoma producing mass effect on the medial head. The full extent not completely imaged but estimated 4 x 9.4 by greater than 15 cm. There is associated soft tissue edema.  This report was finalized on 8/23/2023 8:14 AM by Dr. ROSALINDA Manuel MD.          Assessment:        1. Hematoma of right lower leg           Plan:          Discussed treatment options at length with patient at today's visit.  Discussed with the patient that MRI findings are consistent with hematoma and a hemorrhagic cyst.  I discussed with him that the best course of action is observation and compression.  I recommend he Ace wrap his leg throughout the day when he is up and ice it at night.  He does not have any restrictions from my standpoint.   He would like to return to work and I think that that is okay for him to do so.  I discussed with him that my differential or clinical suspicion for malignancy or tumor is low given the fact that this all came off in a matter of an hour or so and is slowly resolving.  I will plan to the patient to return to work and we will observe this hematoma.  I discussed with him that it may take months for it to go down.  I will plan to see the patient back in 6 weeks.  Follow up: 6 weeks without x-rays      Seth Manuel was in agreement with plan and had all questions answered.     Medications:  No orders of the defined types were placed in this encounter.      Followup:  No follow-ups on file.    Diagnoses and all orders for this visit:    1. Hematoma of right lower leg (Primary)          Dictated utilizing Dragon dictation

## 2023-12-19 DIAGNOSIS — E11.9 TYPE 2 DIABETES MELLITUS WITHOUT COMPLICATION, WITHOUT LONG-TERM CURRENT USE OF INSULIN: ICD-10-CM

## 2023-12-26 DIAGNOSIS — I10 ESSENTIAL HYPERTENSION: Chronic | ICD-10-CM

## 2023-12-26 DIAGNOSIS — E11.9 TYPE 2 DIABETES MELLITUS WITHOUT COMPLICATION, WITHOUT LONG-TERM CURRENT USE OF INSULIN: Chronic | ICD-10-CM

## 2023-12-26 DIAGNOSIS — Z12.5 ENCOUNTER FOR SCREENING FOR MALIGNANT NEOPLASM OF PROSTATE: ICD-10-CM

## 2023-12-26 DIAGNOSIS — E78.2 MIXED HYPERLIPIDEMIA: Chronic | ICD-10-CM

## 2024-01-06 LAB
ALBUMIN SERPL-MCNC: 4.9 G/DL (ref 3.5–5.2)
ALBUMIN/CREAT UR: <11 MG/G CREAT (ref 0–29)
ALBUMIN/GLOB SERPL: 2.7 G/DL
ALP SERPL-CCNC: 67 U/L (ref 39–117)
ALT SERPL-CCNC: 62 U/L (ref 1–41)
APPEARANCE UR: CLEAR
AST SERPL-CCNC: 30 U/L (ref 1–40)
BILIRUB SERPL-MCNC: 0.4 MG/DL (ref 0–1.2)
BILIRUB UR QL STRIP: NEGATIVE
BUN SERPL-MCNC: 12 MG/DL (ref 8–23)
BUN/CREAT SERPL: 15.8 (ref 7–25)
CALCIUM SERPL-MCNC: 10 MG/DL (ref 8.6–10.5)
CHLORIDE SERPL-SCNC: 95 MMOL/L (ref 98–107)
CHOLEST SERPL-MCNC: 193 MG/DL (ref 0–200)
CHOLEST/HDLC SERPL: 3.33 {RATIO}
CO2 SERPL-SCNC: 27.3 MMOL/L (ref 22–29)
COLOR UR: YELLOW
CREAT SERPL-MCNC: 0.76 MG/DL (ref 0.76–1.27)
CREAT UR-MCNC: 26.7 MG/DL
EGFRCR SERPLBLD CKD-EPI 2021: 101 ML/MIN/1.73
ERYTHROCYTE [DISTWIDTH] IN BLOOD BY AUTOMATED COUNT: 13.5 % (ref 12.3–15.4)
GLOBULIN SER CALC-MCNC: 1.8 GM/DL
GLUCOSE SERPL-MCNC: 100 MG/DL (ref 65–99)
GLUCOSE UR QL STRIP: NEGATIVE
HBA1C MFR BLD: 6.7 % (ref 4.8–5.6)
HCT VFR BLD AUTO: 42.8 % (ref 37.5–51)
HDLC SERPL-MCNC: 58 MG/DL (ref 40–60)
HGB BLD-MCNC: 14.5 G/DL (ref 13–17.7)
HGB UR QL STRIP: NEGATIVE
KETONES UR QL STRIP: NEGATIVE
LDLC SERPL CALC-MCNC: 114 MG/DL (ref 0–100)
LEUKOCYTE ESTERASE UR QL STRIP: NEGATIVE
MCH RBC QN AUTO: 30.6 PG (ref 26.6–33)
MCHC RBC AUTO-ENTMCNC: 33.9 G/DL (ref 31.5–35.7)
MCV RBC AUTO: 90.3 FL (ref 79–97)
MICROALBUMIN UR-MCNC: <3 UG/ML
NITRITE UR QL STRIP: NEGATIVE
PH UR STRIP: 7.5 [PH] (ref 5–8)
PLATELET # BLD AUTO: 296 10*3/MM3 (ref 140–450)
POTASSIUM SERPL-SCNC: 4.5 MMOL/L (ref 3.5–5.2)
PROT SERPL-MCNC: 6.7 G/DL (ref 6–8.5)
PROT UR QL STRIP: NEGATIVE
PSA SERPL-MCNC: 0.43 NG/ML (ref 0–4)
RBC # BLD AUTO: 4.74 10*6/MM3 (ref 4.14–5.8)
SODIUM SERPL-SCNC: 135 MMOL/L (ref 136–145)
SP GR UR STRIP: 1.01 (ref 1–1.03)
TRIGL SERPL-MCNC: 120 MG/DL (ref 0–150)
TSH SERPL DL<=0.005 MIU/L-ACNC: 2.99 UIU/ML (ref 0.27–4.2)
UROBILINOGEN UR STRIP-MCNC: NORMAL MG/DL
VLDLC SERPL CALC-MCNC: 21 MG/DL (ref 5–40)
WBC # BLD AUTO: 9.57 10*3/MM3 (ref 3.4–10.8)

## 2024-01-28 DIAGNOSIS — I10 ESSENTIAL HYPERTENSION: ICD-10-CM

## 2024-01-29 RX ORDER — FAMOTIDINE 40 MG/1
TABLET, FILM COATED ORAL
Qty: 90 TABLET | Refills: 3 | Status: SHIPPED | OUTPATIENT
Start: 2024-01-29

## 2024-01-29 RX ORDER — CARVEDILOL 3.12 MG/1
TABLET ORAL
Qty: 180 TABLET | Refills: 3 | Status: SHIPPED | OUTPATIENT
Start: 2024-01-29

## 2024-01-29 RX ORDER — PITAVASTATIN 2 MG/1
2 TABLET, FILM COATED ORAL
Qty: 90 TABLET | Refills: 3 | Status: SHIPPED | OUTPATIENT
Start: 2024-01-29

## 2024-02-06 ENCOUNTER — OFFICE VISIT (OUTPATIENT)
Dept: INTERNAL MEDICINE | Facility: CLINIC | Age: 64
End: 2024-02-06
Payer: COMMERCIAL

## 2024-02-06 VITALS
SYSTOLIC BLOOD PRESSURE: 130 MMHG | OXYGEN SATURATION: 97 % | DIASTOLIC BLOOD PRESSURE: 76 MMHG | TEMPERATURE: 97.8 F | HEART RATE: 104 BPM | BODY MASS INDEX: 28.49 KG/M2 | WEIGHT: 215 LBS | HEIGHT: 73 IN

## 2024-02-06 DIAGNOSIS — E11.9 TYPE 2 DIABETES MELLITUS WITHOUT COMPLICATION, WITHOUT LONG-TERM CURRENT USE OF INSULIN: Chronic | ICD-10-CM

## 2024-02-06 DIAGNOSIS — I42.0 ISCHEMIC DILATED CARDIOMYOPATHY: ICD-10-CM

## 2024-02-06 DIAGNOSIS — I10 ESSENTIAL HYPERTENSION: Chronic | ICD-10-CM

## 2024-02-06 DIAGNOSIS — E78.2 MIXED HYPERLIPIDEMIA: Chronic | ICD-10-CM

## 2024-02-06 DIAGNOSIS — I25.2 PERSONAL HISTORY OF MI (MYOCARDIAL INFARCTION): Chronic | ICD-10-CM

## 2024-02-06 DIAGNOSIS — G47.20 SLEEP PATTERN DISTURBANCE: Chronic | ICD-10-CM

## 2024-02-06 DIAGNOSIS — I25.10 CORONARY ARTERY DISEASE INVOLVING NATIVE CORONARY ARTERY OF NATIVE HEART WITHOUT ANGINA PECTORIS: Chronic | ICD-10-CM

## 2024-02-06 DIAGNOSIS — F33.1 MODERATE EPISODE OF RECURRENT MAJOR DEPRESSIVE DISORDER: ICD-10-CM

## 2024-02-06 DIAGNOSIS — Z00.00 ENCOUNTER FOR WELLNESS EXAMINATION IN ADULT: Primary | ICD-10-CM

## 2024-02-06 DIAGNOSIS — Z12.2 ENCOUNTER FOR SCREENING FOR MALIGNANT NEOPLASM OF LUNG: ICD-10-CM

## 2024-02-06 DIAGNOSIS — Z12.11 SCREENING FOR MALIGNANT NEOPLASM OF COLON: ICD-10-CM

## 2024-02-06 DIAGNOSIS — I25.5 ISCHEMIC DILATED CARDIOMYOPATHY: ICD-10-CM

## 2024-02-06 PROCEDURE — 99396 PREV VISIT EST AGE 40-64: CPT | Performed by: NURSE PRACTITIONER

## 2024-02-06 RX ORDER — TRAZODONE HYDROCHLORIDE 50 MG/1
50-100 TABLET ORAL
Qty: 180 TABLET | Refills: 3 | Status: SHIPPED | OUTPATIENT
Start: 2024-02-06

## 2024-02-06 NOTE — PROGRESS NOTES
DENNIS Ann is a 63 y.o. male presenting for Annual Exam    His current/chronic health conditions include:  Patient Active Problem List   Diagnosis    Coronary artery disease involving native coronary artery    Type 2 diabetes mellitus    Essential hypertension    AMIRA (obstructive sleep apnea)    Drug-induced erectile dysfunction    S/P coronary artery stent placement    HLD (hyperlipidemia)    Osteoarthritis of both knees    Rupture of anterior cruciate ligament of left knee    Personal history of MI (myocardial infarction)    Ischemic dilated cardiomyopathy    Allergic rhinitis    Moderate episode of recurrent major depressive disorder    Sleep pattern disturbance       Outpatient Medications Marked as Taking for the 2/6/24 encounter (Office Visit) with Joselyn Ross APRN   Medication Sig Dispense Refill    Ascorbic Acid (VITAMIN C PO) Take 3,000 Units by mouth.      aspirin 81 MG EC tablet Take 1 tablet by mouth Daily.      carvedilol (COREG) 3.125 MG tablet TAKE 1 TABLET BY MOUTH TWICE  DAILY WITH MEALS 180 tablet 3    Coenzyme Q10 (COQ10 PO) Take 250 mg by mouth 2 (Two) Times a Day.      famotidine (PEPCID) 40 MG tablet TAKE 1 TABLET BY MOUTH DAILY 90 tablet 3    KRILL OIL PO Take  by mouth.      melatonin 3 MG tablet Take 20 mg by mouth Every Night.      metFORMIN (GLUCOPHAGE) 500 MG tablet TAKE 1 TABLET BY MOUTH TWICE  DAILY 180 tablet 3    montelukast (SINGULAIR) 10 MG tablet TAKE 1 TABLET BY MOUTH AT  NIGHT 90 tablet 3    omega-3 acid ethyl esters (LOVAZA) 1 g capsule TAKE 2 CAPSULES BY MOUTH  TWICE DAILY 360 capsule 3    pitavastatin calcium (LIVALO) 2 MG tablet tablet TAKE 1 TABLET BY MOUTH AT NIGHT 90 tablet 3    traZODone (DESYREL) 50 MG tablet Take 1-2 tablets by mouth every night at bedtime. 180 tablet 3    [DISCONTINUED] traZODone (DESYREL) 50 MG tablet Take 1-2 tablets by mouth every night at bedtime. 10 tablet 0          Health Habits:  Nutrition: not as consistent over the  "holidays  Exercise: not as much recently    Screenings:  PSA: 0.431  Colon Cancer: last cls was 2008; sts he \"will try\" a Cologuard; no prior polyps per pt; no FH  Tob use: due for LDCT      Mr. Manuel has HTN, HLD, and DM. He reports his is doing well. He is tolerating his current medication regimen w/o SEs.  He also has CAD, prior MI s/p stenting, and ischemic cardiomyopathy. He tells me has he elected not to f/u w/ Cardiology for sometime.      The patient's allergies, current medications, problem list, past medical history, past family history, and past social history were reviewed and updated as appropriate.    Review of Systems   Constitutional: Negative.    HENT: Negative.     Eyes: Negative.    Respiratory: Negative.     Cardiovascular: Negative.    Gastrointestinal: Negative.    Endocrine: Negative.    Genitourinary: Negative.    Musculoskeletal:  Positive for arthralgias.   Skin: Negative.    Allergic/Immunologic: Negative.    Neurological: Negative.    Hematological: Negative.    Psychiatric/Behavioral:  Positive for sleep disturbance.        OBJECTIVE    Vitals:    02/06/24 0808   BP: 130/76   BP Location: Left arm   Patient Position: Sitting   Cuff Size: Adult   Pulse: 104   Temp: 97.8 °F (36.6 °C)   TempSrc: Infrared   SpO2: 97%   Weight: 97.5 kg (215 lb)   Height: 185.4 cm (72.99\")       BP Readings from Last 3 Encounters:   02/06/24 130/76   08/22/23 132/85   08/21/23 118/70       Wt Readings from Last 3 Encounters:   02/06/24 97.5 kg (215 lb)   08/29/23 95.3 kg (210 lb)   08/24/23 95.3 kg (210 lb)       Body mass index is 28.37 kg/m².  Nursing notes and vital signs reviewed.    Physical Exam  Constitutional:       General: He is not in acute distress.     Appearance: Normal appearance. He is well-developed.   HENT:      Head: Normocephalic.      Right Ear: Hearing, tympanic membrane, ear canal and external ear normal.      Left Ear: Hearing, tympanic membrane, ear canal and external ear normal.      " Nose: Nose normal. No mucosal edema or rhinorrhea.      Mouth/Throat:      Mouth: Mucous membranes are moist.      Pharynx: Oropharynx is clear. Uvula midline.   Eyes:      General: Lids are normal.      Extraocular Movements: Extraocular movements intact.      Conjunctiva/sclera: Conjunctivae normal.      Pupils: Pupils are equal, round, and reactive to light.   Neck:      Thyroid: No thyroid mass or thyromegaly.   Cardiovascular:      Rate and Rhythm: Regular rhythm.      Pulses: Normal pulses.      Heart sounds: S1 normal and S2 normal. No murmur heard.     No friction rub. No gallop.   Pulmonary:      Effort: Pulmonary effort is normal.      Breath sounds: Normal breath sounds. No wheezing, rhonchi or rales.   Abdominal:      General: Bowel sounds are normal.      Palpations: Abdomen is soft.      Tenderness: There is no abdominal tenderness. There is no guarding.      Hernia: No hernia is present.   Musculoskeletal:         General: No deformity. Normal range of motion.      Cervical back: Normal range of motion and neck supple.   Lymphadenopathy:      Cervical: No cervical adenopathy.   Skin:     General: Skin is warm and dry.      Findings: No lesion or rash.   Neurological:      General: No focal deficit present.      Mental Status: He is alert and oriented to person, place, and time.      Cranial Nerves: No cranial nerve deficit.      Sensory: No sensory deficit.      Motor: Motor function is intact.      Coordination: Coordination is intact.      Gait: Gait normal.      Deep Tendon Reflexes: Reflexes are normal and symmetric.   Psychiatric:         Attention and Perception: He is attentive.         Mood and Affect: Mood and affect normal.         Speech: Speech normal.         Behavior: Behavior normal.         Thought Content: Thought content normal.           Recent Results (from the past 1680 hour(s))   Microalbumin / Creatinine Urine Ratio - Urine, Clean Catch    Collection Time: 01/05/24  8:14 AM     Specimen: Urine, Clean Catch   Result Value Ref Range    Creatinine, Urine 26.7 Not Estab. mg/dL    Microalbumin, Urine <3.0 Not Estab. ug/mL    Microalbumin/Creatinine Ratio <11 0 - 29 mg/g creat   Urinalysis without microscopic (no culture) - Urine, Clean Catch    Collection Time: 01/05/24  8:14 AM    Specimen: Urine, Clean Catch   Result Value Ref Range    Specific Gravity, UA 1.010 1.005 - 1.030    pH, UA 7.5 5.0 - 8.0    Color, UA Yellow     Appearance, UA Clear Clear    Leukocytes, UA Negative Negative    Protein Negative Negative    Glucose, UA Negative Negative    Ketones Negative Negative    Blood, UA Negative Negative    Bilirubin, UA Negative Negative    Urobilinogen, UA Comment     Nitrite, UA Negative Negative   PSA Screen    Collection Time: 01/05/24  8:14 AM    Specimen: Blood   Result Value Ref Range    PSA 0.431 0.000 - 4.000 ng/mL   TSH    Collection Time: 01/05/24  8:14 AM    Specimen: Blood   Result Value Ref Range    TSH 2.990 0.270 - 4.200 uIU/mL   Lipid Panel With / Chol / HDL Ratio    Collection Time: 01/05/24  8:14 AM    Specimen: Blood   Result Value Ref Range    Total Cholesterol 193 0 - 200 mg/dL    Triglycerides 120 0 - 150 mg/dL    HDL Cholesterol 58 40 - 60 mg/dL    VLDL Cholesterol Juan Antonio 21 5 - 40 mg/dL    LDL Chol Calc (NIH) 114 (H) 0 - 100 mg/dL    Chol/HDL Ratio 3.33    Hemoglobin A1c    Collection Time: 01/05/24  8:14 AM    Specimen: Blood   Result Value Ref Range    Hemoglobin A1C 6.70 (H) 4.80 - 5.60 %   Comprehensive Metabolic Panel    Collection Time: 01/05/24  8:14 AM    Specimen: Blood   Result Value Ref Range    Glucose 100 (H) 65 - 99 mg/dL    BUN 12 8 - 23 mg/dL    Creatinine 0.76 0.76 - 1.27 mg/dL    EGFR Result 101.0 >60.0 mL/min/1.73    BUN/Creatinine Ratio 15.8 7.0 - 25.0    Sodium 135 (L) 136 - 145 mmol/L    Potassium 4.5 3.5 - 5.2 mmol/L    Chloride 95 (L) 98 - 107 mmol/L    Total CO2 27.3 22.0 - 29.0 mmol/L    Calcium 10.0 8.6 - 10.5 mg/dL    Total Protein 6.7 6.0 -  8.5 g/dL    Albumin 4.9 3.5 - 5.2 g/dL    Globulin 1.8 gm/dL    A/G Ratio 2.7 g/dL    Total Bilirubin 0.4 0.0 - 1.2 mg/dL    Alkaline Phosphatase 67 39 - 117 U/L    AST (SGOT) 30 1 - 40 U/L    ALT (SGPT) 62 (H) 1 - 41 U/L   CBC (No Diff)    Collection Time: 01/05/24  8:14 AM    Specimen: Blood   Result Value Ref Range    WBC 9.57 3.40 - 10.80 10*3/mm3    RBC 4.74 4.14 - 5.80 10*6/mm3    Hemoglobin 14.5 13.0 - 17.7 g/dL    Hematocrit 42.8 37.5 - 51.0 %    MCV 90.3 79.0 - 97.0 fL    MCH 30.6 26.6 - 33.0 pg    MCHC 33.9 31.5 - 35.7 g/dL    RDW 13.5 12.3 - 15.4 %    Platelets 296 140 - 450 10*3/mm3           ASSESSMENT AND PLAN    Diagnoses and all orders for this visit:    1. Encounter for wellness examination in adult (Primary)    2. Screening for malignant neoplasm of colon  -     Cologuard - Stool, Per Rectum; Future    3. Encounter for screening for malignant neoplasm of lung  -      CT Chest Low Dose Cancer Screening WO; Future    4. Essential hypertension    5. Mixed hyperlipidemia    6. Type 2 diabetes mellitus without complication, without long-term current use of insulin    7. Coronary artery disease involving native coronary artery of native heart without angina pectoris    8. Personal history of MI (myocardial infarction)    9. Ischemic dilated cardiomyopathy    10. Sleep pattern disturbance  -     traZODone (DESYREL) 50 MG tablet; Take 1-2 tablets by mouth every night at bedtime.  Dispense: 180 tablet; Refill: 3    11. Moderate episode of recurrent major depressive disorder  -     traZODone (DESYREL) 50 MG tablet; Take 1-2 tablets by mouth every night at bedtime.  Dispense: 180 tablet; Refill: 3          #1, 2, 3. Preventative counseling completed including relevant screenings, appropriate vaccinations, healthy nutrition, and appropriate physical activity. Age-appropriate Screening & Interventions recommended by USPSTF were reviewed with the patient, and Health Maintenance was updated in Epic. Declines any  discussion of vaccines.  BMI is >= 25 and <30. (Overweight) The following options were offered after discussion;: exercise counseling/recommendations and nutrition counseling/recommendations    4. Controlled, no change    5. Uncontrolled. Continue current med reg.  Patient counseled regarding therapeutic lifestyle changes including improved nutrition, increased exercise, and weight loss.    6. A1c remains <6.8 but has risen sharply since last labs.  Continue current med reg.  Patient counseled regarding therapeutic lifestyle changes including improved nutrition, increased exercise, and weight loss.    7, 8, 9. Counseled on importance of continued Cardiology surveillance and f/u. PVU.    10, 11. RF Trazodone.        Medications, including side effects, were discussed with the patient. Patient verbalized understanding.  The plan of care was discussed. All questions were answered. Patient verbalized understanding.        Return in about 6 months (around 8/6/2024) for fasting labs one week prior to.

## 2024-02-23 ENCOUNTER — HOSPITAL ENCOUNTER (OUTPATIENT)
Dept: CT IMAGING | Facility: HOSPITAL | Age: 64
Discharge: HOME | End: 2024-02-23
Admitting: NURSE PRACTITIONER
Payer: COMMERCIAL

## 2024-02-23 DIAGNOSIS — Z12.2 ENCOUNTER FOR SCREENING FOR MALIGNANT NEOPLASM OF LUNG: ICD-10-CM

## 2024-02-23 PROCEDURE — 71271 CT THORAX LUNG CANCER SCR C-: CPT

## 2024-04-22 DIAGNOSIS — E78.5 HYPERLIPIDEMIA, UNSPECIFIED HYPERLIPIDEMIA TYPE: ICD-10-CM

## 2024-04-23 RX ORDER — OMEGA-3-ACID ETHYL ESTERS 1 G/1
CAPSULE, LIQUID FILLED ORAL
Qty: 360 CAPSULE | Refills: 3 | Status: SHIPPED | OUTPATIENT
Start: 2024-04-23

## 2024-04-23 RX ORDER — MONTELUKAST SODIUM 10 MG/1
TABLET ORAL
Qty: 90 TABLET | Refills: 3 | Status: SHIPPED | OUTPATIENT
Start: 2024-04-23

## 2024-07-17 ENCOUNTER — OFFICE VISIT (OUTPATIENT)
Dept: CARDIOLOGY | Facility: CLINIC | Age: 64
End: 2024-07-17
Payer: COMMERCIAL

## 2024-07-17 VITALS
BODY MASS INDEX: 28.04 KG/M2 | WEIGHT: 211.6 LBS | SYSTOLIC BLOOD PRESSURE: 116 MMHG | HEIGHT: 73 IN | HEART RATE: 74 BPM | DIASTOLIC BLOOD PRESSURE: 68 MMHG

## 2024-07-17 DIAGNOSIS — G47.33 OSA (OBSTRUCTIVE SLEEP APNEA): ICD-10-CM

## 2024-07-17 DIAGNOSIS — I10 ESSENTIAL HYPERTENSION: ICD-10-CM

## 2024-07-17 DIAGNOSIS — I25.5 ISCHEMIC DILATED CARDIOMYOPATHY: ICD-10-CM

## 2024-07-17 DIAGNOSIS — I42.0 ISCHEMIC DILATED CARDIOMYOPATHY: ICD-10-CM

## 2024-07-17 DIAGNOSIS — I25.5 ISCHEMIC CARDIOMYOPATHY: Primary | ICD-10-CM

## 2024-07-17 DIAGNOSIS — I25.2 HISTORY OF ST ELEVATION MYOCARDIAL INFARCTION (STEMI): ICD-10-CM

## 2024-07-17 DIAGNOSIS — E11.9 TYPE 2 DIABETES MELLITUS WITHOUT COMPLICATION, WITHOUT LONG-TERM CURRENT USE OF INSULIN: Chronic | ICD-10-CM

## 2024-07-17 DIAGNOSIS — Z95.5 S/P CORONARY ARTERY STENT PLACEMENT: Chronic | ICD-10-CM

## 2024-07-17 DIAGNOSIS — I25.10 CORONARY ARTERY DISEASE INVOLVING NATIVE CORONARY ARTERY OF NATIVE HEART WITHOUT ANGINA PECTORIS: Chronic | ICD-10-CM

## 2024-07-17 DIAGNOSIS — E78.2 MIXED HYPERLIPIDEMIA: Chronic | ICD-10-CM

## 2024-07-17 RX ORDER — DAPAGLIFLOZIN 10 MG/1
10 TABLET, FILM COATED ORAL DAILY
Qty: 90 TABLET | Refills: 3 | Status: SHIPPED | OUTPATIENT
Start: 2024-07-17

## 2024-07-17 RX ORDER — EZETIMIBE 10 MG/1
10 TABLET ORAL DAILY
Qty: 90 TABLET | Refills: 3 | Status: SHIPPED | OUTPATIENT
Start: 2024-07-17

## 2024-07-17 NOTE — PROGRESS NOTES
Date of Office Visit: 2024  Encounter Provider: BREANNE Torres  Place of Service: [facility] CARDIOLOGY  Patient Name: Seth Manuel  :1960    No chief complaint on file.  : History of anterior ST GUS  Coronary artery disease    HPI: Seth Manuel is a 63 y.o. male who is a patient of  Dr. Yates.  He was last seen in our office in 2022 for surgical clearance.  He has a history of coronary artery disease with previous anterior ST elevation MI in .  At that time, he had an occluded LAD and 4.0 x 12 mm Resolute drug-eluting stent was placed.  Patient also was found to have cardiomyopathy and wore LifeVest for short period of time.  He underwent cardiac rehab and his EF improved to 45%.  Patient also has history of hypertension, hyperlipidemia, diabetes type 2 and AMIRA (unable to tolerate CPAP).     On last office visit, he presented for surgical clearance.  An echocardiogram was performed which showed LVEF 36 to 40%, hypokinetic mid anterior, mid inferoseptal and mid anteroseptal segments.  Akinetic apical anterior, apical lateral, apical inferior, apical septal and apex.  Mitral regurgitation and mild aortic regurgitation.    Presents today with no complaints of chest pain, shortness of air, lightheadedness or edema.  He is in the process of emptying out his mother-in-law's house.  He stays very active.  He works night shift for time.  BP well controlled.  EKG stable.  Discussed importance of having risk factors under control specially with his family history and personal history.    Previous testing and notes have been reviewed by me.   Past Medical History:   Diagnosis Date    Allergic rhinitis 10/27/2017    Arthritis     Coronary artery disease     As noted in medical records    Diabetes mellitus     Erectile dysfunction     Hyperlipidemia     Hypertension     Myocardial infarction 2014    Sleep apnea     has a C-Pap but doesn't use       Past  Surgical History:   Procedure Laterality Date    BICEPS TENDON REPAIR      CORONARY STENT PLACEMENT      Medtronic Resolute stent into LAD    HERNIA REPAIR      Umbilical     TONSILLECTOMY      VASECTOMY      As noted in medical records       Social History     Socioeconomic History    Marital status:    Tobacco Use    Smoking status: Former     Current packs/day: 0.00     Average packs/day: 1 pack/day for 41.0 years (41.0 ttl pk-yrs)     Types: Cigarettes     Start date: 1974     Quit date: 2015     Years since quittin.5     Passive exposure: Past    Smokeless tobacco: Never   Vaping Use    Vaping status: Never Used   Substance and Sexual Activity    Alcohol use: Yes     Alcohol/week: 6.0 standard drinks of alcohol     Types: 6 Cans of beer per week     Comment: beer 2-3 days/week    Drug use: No    Sexual activity: Defer     Partners: Female     Birth control/protection: None       Family History   Problem Relation Age of Onset    Hypertension Mother     Diabetes Mother     Graves' disease Mother     Rheum arthritis Mother     Heart attack Mother 50    Hypertension Father     Cancer Father         Throat    Stroke Father 76    Heart attack Father 50    Diabetes Brother     Lung cancer Paternal Uncle     Rheum arthritis Maternal Grandmother     Heart disease Maternal Grandfather     Diabetes Paternal Grandmother     Heart disease Paternal Grandfather        Review of Systems   Constitutional: Negative.   HENT: Negative.     Eyes: Negative.    Cardiovascular: Negative.    Respiratory: Negative.     Endocrine: Negative.    Hematologic/Lymphatic: Negative.    Skin: Negative.    Musculoskeletal: Negative.    Gastrointestinal: Negative.    Genitourinary: Negative.    Neurological: Negative.    Psychiatric/Behavioral: Negative.     Allergic/Immunologic: Negative.        Allergies   Allergen Reactions    Crestor [Rosuvastatin Calcium] Myalgia    Lipitor [Atorvastatin] Myalgia         Current  "Outpatient Medications:     Ascorbic Acid (VITAMIN C PO), Take 3,000 Units by mouth., Disp: , Rfl:     aspirin 81 MG EC tablet, Take 1 tablet by mouth Daily., Disp: , Rfl:     carvedilol (COREG) 3.125 MG tablet, TAKE 1 TABLET BY MOUTH TWICE  DAILY WITH MEALS, Disp: 180 tablet, Rfl: 3    Coenzyme Q10 (COQ10 PO), Take 250 mg by mouth 2 (Two) Times a Day., Disp: , Rfl:     famotidine (PEPCID) 40 MG tablet, TAKE 1 TABLET BY MOUTH DAILY, Disp: 90 tablet, Rfl: 3    KRILL OIL PO, Take  by mouth., Disp: , Rfl:     melatonin 3 MG tablet, Take 20 mg by mouth Every Night., Disp: , Rfl:     metFORMIN (GLUCOPHAGE) 500 MG tablet, TAKE 1 TABLET BY MOUTH TWICE  DAILY, Disp: 180 tablet, Rfl: 3    montelukast (SINGULAIR) 10 MG tablet, TAKE 1 TABLET BY MOUTH AT NIGHT, Disp: 90 tablet, Rfl: 3    omega-3 acid ethyl esters (LOVAZA) 1 g capsule, TAKE 2 CAPSULES BY MOUTH TWICE  DAILY, Disp: 360 capsule, Rfl: 3    pitavastatin calcium (LIVALO) 2 MG tablet tablet, TAKE 1 TABLET BY MOUTH AT NIGHT, Disp: 90 tablet, Rfl: 3    tadalafil (CIALIS) 5 MG tablet, TAKE ONE TABLET BY MOUTH DAILY, Disp: 10 tablet, Rfl: 0    traZODone (DESYREL) 50 MG tablet, Take 1-2 tablets by mouth every night at bedtime., Disp: 180 tablet, Rfl: 3    dapagliflozin Propanediol (Farxiga) 10 MG tablet, Take 10 mg by mouth Daily., Disp: 90 tablet, Rfl: 3    ezetimibe (ZETIA) 10 MG tablet, Take 1 tablet by mouth Daily., Disp: 90 tablet, Rfl: 3      Objective:     Vitals:    07/17/24 1148   BP: 116/68   Pulse: 74   Weight: 96 kg (211 lb 9.6 oz)   Height: 185.4 cm (72.99\")     Body mass index is 27.92 kg/m².    2D Echocardiogram 04/28/2022:  The left ventricular cavity is moderately dilated.  Left ventricular ejection fraction appears to be 36 - 40%.  The following left ventricular wall segments are hypokinetic: mid anterior, mid inferoseptal and mid anteroseptal. The following left ventricular wall segments are akinetic: apical anterior, apical lateral, apical inferior, " apical septal and apex.  Left atrial volume is moderately increased.  There is mild, bileaflet mitral valve thickening present.  Mild mitral valve regurgitation is present.  Mild aortic valve regurgitation is present.       PHYSICAL EXAM:    Constitutional:       Appearance: Healthy appearance. Not in distress.   Neck:      Vascular: No JVR. JVD normal.   Pulmonary:      Effort: Pulmonary effort is normal.      Breath sounds: Normal breath sounds. No wheezing. No rhonchi. No rales.   Chest:      Chest wall: Not tender to palpatation.   Cardiovascular:      PMI at left midclavicular line. Normal rate. Regular rhythm. Normal S1. Normal S2.       Murmurs: There is no murmur.      No gallop.  No click. No rub.   Pulses:     Intact distal pulses.   Edema:     Peripheral edema absent.   Abdominal:      General: Bowel sounds are normal.      Palpations: Abdomen is soft.      Tenderness: There is no abdominal tenderness.   Musculoskeletal: Normal range of motion.         General: No tenderness. Skin:     General: Skin is warm and dry.   Neurological:      General: No focal deficit present.      Mental Status: Alert and oriented to person, place and time.           ECG 12 Lead    Date/Time: 7/17/2024 1:20 PM  Performed by: Pam Calderon APRN    Authorized by: Pam Calderon APRN  Comparison: compared with previous ECG from 4/27/2022  Rhythm: sinus rhythm  Rate: normal  BPM: 74  Conduction: non-specific intraventricular conduction delay  Q waves: II and aVF    T inversion: V4, V5 and V6            Assessment:       Diagnosis Plan   1. Ischemic cardiomyopathy  Adult Transthoracic Echo Complete W/ Cont if Necessary Per Protocol      2. Essential hypertension        3. Coronary artery disease involving native coronary artery of native heart without angina pectoris        4. S/P coronary artery stent placement        5. Mixed hyperlipidemia        6. History of ST elevation myocardial infarction (STEMI)        7.  Ischemic dilated cardiomyopathy        8. Type 2 diabetes mellitus without complication, without long-term current use of insulin        9. AMIRA (obstructive sleep apnea)          Orders Placed This Encounter   Procedures    Adult Transthoracic Echo Complete W/ Cont if Necessary Per Protocol     Standing Status:   Future     Standing Expiration Date:   7/17/2025     Order Specific Question:   Reason for exam?     Answer:   Heart Failure, Cardiomyopathy, or Sytemic or Pulmonary Hypertension     Order Specific Question:   Release to patient     Answer:   Routine Release [[id number]]          Plan:       1.  History of anterior STEMI in 2014  2.  Coronary artery disease status post PCI with RUIZ to LAD in 2014, on asa, statin and beta blocker.  No angina.  Stable EKG.  3.  Ischemic cardiomyopathy: Wore LifeVest.  For short time completed cardiac rehab.  EF back to 45%,  most recent echocardiogram 04/2022 shows EF 36 to 40%.  Currently on low-dose carvedilol.  Will start on Farxiga at this time. (Coupon card given) plan for echocardiogram prior to his next appointment in about 6 to 8 months.  4.  Hyperlipidemia goal LDL less than 70: Lipid panel 1/2024 shows , HDL 58.  On livalo and co-Q10. endorses compliance with medication.  Will start Zetia at this time.    5.  Diabetes type 2: HbA1c in January 2024 6.7.  On metformin.  Followed by PCP.  Added Farxiga secondary to heart failure.  This may help with his diabetes as well.  6.  History of premature coronary artery disease: Father had his first MI at age 53, same age as patient.  His mother also had his history of MI.  7. AMIRA: Will to tolerate CPAP    Mr. Manuel will follow-up with Dr. Yates in 6 to 8 months.  He will have an echocardiogram prior to that office visit.  PCP follows lipid panel and there is an active order.  I have started him on Zetia at this office visit.  He will call sooner for any questions or concerns.           Your medication list             Accurate as of July 17, 2024  1:14 PM. If you have any questions, ask your nurse or doctor.                START taking these medications        Instructions Last Dose Given Next Dose Due   dapagliflozin Propanediol 10 MG tablet  Commonly known as: Farxiga  Started by: BREANNE Torres      Take 10 mg by mouth Daily.       ezetimibe 10 MG tablet  Commonly known as: ZETIA  Started by: BREANNE Torres      Take 1 tablet by mouth Daily.              CONTINUE taking these medications        Instructions Last Dose Given Next Dose Due   aspirin 81 MG EC tablet      Take 1 tablet by mouth Daily.       carvedilol 3.125 MG tablet  Commonly known as: COREG      TAKE 1 TABLET BY MOUTH TWICE  DAILY WITH MEALS       COQ10 PO      Take 250 mg by mouth 2 (Two) Times a Day.       famotidine 40 MG tablet  Commonly known as: PEPCID      TAKE 1 TABLET BY MOUTH DAILY       KRILL OIL PO      Take  by mouth.       melatonin 3 MG tablet      Take 20 mg by mouth Every Night.       metFORMIN 500 MG tablet  Commonly known as: GLUCOPHAGE      TAKE 1 TABLET BY MOUTH TWICE  DAILY       montelukast 10 MG tablet  Commonly known as: SINGULAIR      TAKE 1 TABLET BY MOUTH AT NIGHT       omega-3 acid ethyl esters 1 g capsule  Commonly known as: LOVAZA      TAKE 2 CAPSULES BY MOUTH TWICE  DAILY       pitavastatin calcium 2 MG tablet tablet  Commonly known as: LIVALO      TAKE 1 TABLET BY MOUTH AT NIGHT       tadalafil 5 MG tablet  Commonly known as: CIALIS      TAKE ONE TABLET BY MOUTH DAILY       traZODone 50 MG tablet  Commonly known as: DESYREL      Take 1-2 tablets by mouth every night at bedtime.       VITAMIN C PO      Take 3,000 Units by mouth.                 Where to Get Your Medications        These medications were sent to [facility] - [place], [place] - [address] [phone]  - [phone] FX  [address] [place] [id number]      Phone: [phone]   dapagliflozin  Propanediol 10 MG tablet  ezetimibe 10 MG tablet           As always, it has been a pleasure to participate in your patient's care.      Sincerely,       BREANNE London

## 2024-07-24 DIAGNOSIS — E78.2 MIXED HYPERLIPIDEMIA: Chronic | ICD-10-CM

## 2024-07-24 DIAGNOSIS — E11.9 TYPE 2 DIABETES MELLITUS WITHOUT COMPLICATION, WITHOUT LONG-TERM CURRENT USE OF INSULIN: Chronic | ICD-10-CM

## 2024-07-24 DIAGNOSIS — I10 ESSENTIAL HYPERTENSION: Chronic | ICD-10-CM

## 2024-11-06 DIAGNOSIS — E11.9 TYPE 2 DIABETES MELLITUS WITHOUT COMPLICATION, WITHOUT LONG-TERM CURRENT USE OF INSULIN: ICD-10-CM

## 2024-11-08 NOTE — TELEPHONE ENCOUNTER
Ok for HUB to read  Left voicemail for patient to call back to schedule a follow up appointment for further refills

## 2024-12-09 ENCOUNTER — TELEPHONE (OUTPATIENT)
Dept: INTERNAL MEDICINE | Facility: CLINIC | Age: 64
End: 2024-12-09
Payer: COMMERCIAL

## 2024-12-09 NOTE — TELEPHONE ENCOUNTER
Hub staff attempted to follow warm transfer process and was unsuccessful     Caller: Seth Manuel A    Relationship to patient: Self    Best call back number: [phone]    Patient is needing: PT CALLING TO MAKE LAB APPT. UNABLE TO WT.

## 2024-12-12 ENCOUNTER — TELEPHONE (OUTPATIENT)
Dept: INTERNAL MEDICINE | Facility: CLINIC | Age: 64
End: 2024-12-12
Payer: COMMERCIAL

## 2024-12-12 DIAGNOSIS — F33.1 MODERATE EPISODE OF RECURRENT MAJOR DEPRESSIVE DISORDER: ICD-10-CM

## 2024-12-12 DIAGNOSIS — G47.20 SLEEP PATTERN DISTURBANCE: Chronic | ICD-10-CM

## 2024-12-12 RX ORDER — TRAZODONE HYDROCHLORIDE 50 MG/1
50-100 TABLET, FILM COATED ORAL
Qty: 180 TABLET | Refills: 3 | Status: SHIPPED | OUTPATIENT
Start: 2024-12-12

## 2024-12-12 NOTE — TELEPHONE ENCOUNTER
Caller: Seth Manuel    Relationship: Self    Best call back number: [phone]    Requested Prescriptions:   Requested Prescriptions     Pending Prescriptions Disp Refills    traZODone (DESYREL) 50 MG tablet 180 tablet 3     Sig: Take 1-2 tablets by mouth every night at bedtime.        Pharmacy where request should be sent: [address] [phone] [facility] [phone]      Last office visit with prescribing clinician: 2/6/2024   Last telemedicine visit with prescribing clinician: Visit date not found   Next office visit with prescribing clinician: 2/27/2025     Additional details provided by patient:     Does the patient have less than a 3 day supply:  [] Yes  [x] No      Jayla Mcdonald Rep   12/12/24 11:41 EST

## 2024-12-12 NOTE — TELEPHONE ENCOUNTER
Hub staff attempted to follow warm transfer process and was unsuccessful     Caller: Seth Manuel    Relationship to patient: Self    Best call back number: [phone] PLEASE CALL BEFORE R 12:30 PM    Patient is needing: PATIENT RETURNED CALL FROM CARLENE, NEEDING TO SCHEDULE LABS. PLEASE CALL PATIENT BACK.

## 2024-12-12 NOTE — TELEPHONE ENCOUNTER
Ok for 1 year supply?    Rx Refill Note  Requested Prescriptions     Pending Prescriptions Disp Refills    traZODone (DESYREL) 50 MG tablet 180 tablet 3     Sig: Take 1-2 tablets by mouth every night at bedtime.      Last office visit with prescribing clinician: 2/6/2024   Last telemedicine visit with prescribing clinician: Visit date not found   Next office visit with prescribing clinician: 2/27/2025                         Would you like a call back once the refill request has been completed: [] Yes [] No    If the office needs to give you a call back, can they leave a voicemail: [] Yes [] No    Constance Melvin MA  12/12/24, 13:29 EST     08-Feb-2017 03:49:37

## 2024-12-13 NOTE — TELEPHONE ENCOUNTER
Hub staff attempted to follow warm transfer process and was unsuccessful     Caller: Seth Manuel A    Relationship to patient: Self    Best call back number: [phone]    Patient is needing: PATIENT IS REQUESTING A CALL BACK TO SCHEDULE LABS AS SOON AS POSSIBLE.

## 2024-12-17 DIAGNOSIS — I10 ESSENTIAL HYPERTENSION: ICD-10-CM

## 2024-12-18 RX ORDER — FAMOTIDINE 40 MG/1
40 TABLET, FILM COATED ORAL DAILY
Qty: 90 TABLET | Refills: 3 | Status: SHIPPED | OUTPATIENT
Start: 2024-12-18

## 2024-12-18 RX ORDER — PITAVASTATIN CALCIUM 2.09 MG/1
2 TABLET, FILM COATED ORAL NIGHTLY
Qty: 90 TABLET | Refills: 3 | Status: SHIPPED | OUTPATIENT
Start: 2024-12-18

## 2024-12-18 RX ORDER — CARVEDILOL 3.12 MG/1
TABLET ORAL
Qty: 180 TABLET | Refills: 3 | Status: SHIPPED | OUTPATIENT
Start: 2024-12-18

## 2025-02-21 DIAGNOSIS — I10 ESSENTIAL HYPERTENSION: Primary | Chronic | ICD-10-CM

## 2025-02-21 DIAGNOSIS — E11.9 TYPE 2 DIABETES MELLITUS WITHOUT COMPLICATION, WITHOUT LONG-TERM CURRENT USE OF INSULIN: Chronic | ICD-10-CM

## 2025-02-21 DIAGNOSIS — E78.2 MIXED HYPERLIPIDEMIA: Chronic | ICD-10-CM

## 2025-02-21 DIAGNOSIS — Z12.5 ENCOUNTER FOR SCREENING FOR MALIGNANT NEOPLASM OF PROSTATE: ICD-10-CM

## 2025-02-22 ENCOUNTER — LAB (OUTPATIENT)
Dept: LAB | Facility: HOSPITAL | Age: 65
End: 2025-02-22
Payer: COMMERCIAL

## 2025-02-22 LAB
ALBUMIN SERPL-MCNC: 4.3 G/DL (ref 3.5–5.2)
ALBUMIN UR-MCNC: <1.2 MG/DL
ALBUMIN/GLOB SERPL: 1.9 G/DL
ALP SERPL-CCNC: 60 U/L (ref 39–117)
ALT SERPL W P-5'-P-CCNC: 20 U/L (ref 1–41)
ANION GAP SERPL CALCULATED.3IONS-SCNC: 10 MMOL/L (ref 5–15)
AST SERPL-CCNC: 25 U/L (ref 1–40)
BILIRUB SERPL-MCNC: 0.7 MG/DL (ref 0–1.2)
BILIRUB UR QL STRIP: NEGATIVE
BUN SERPL-MCNC: 11 MG/DL (ref 8–23)
BUN/CREAT SERPL: 11 (ref 7–25)
CALCIUM SPEC-SCNC: 9.6 MG/DL (ref 8.6–10.5)
CHLORIDE SERPL-SCNC: 100 MMOL/L (ref 98–107)
CHOLEST SERPL-MCNC: 128 MG/DL (ref 0–200)
CLARITY UR: CLEAR
CO2 SERPL-SCNC: 25 MMOL/L (ref 22–29)
COLOR UR: ABNORMAL
CREAT SERPL-MCNC: 1 MG/DL (ref 0.76–1.27)
CREAT UR-MCNC: 55.7 MG/DL
DEPRECATED RDW RBC AUTO: 44.2 FL (ref 37–54)
EGFRCR SERPLBLD CKD-EPI 2021: 84 ML/MIN/1.73
ERYTHROCYTE [DISTWIDTH] IN BLOOD BY AUTOMATED COUNT: 13.8 % (ref 12.3–15.4)
GLOBULIN UR ELPH-MCNC: 2.3 GM/DL
GLUCOSE SERPL-MCNC: 102 MG/DL (ref 65–99)
GLUCOSE UR STRIP-MCNC: ABNORMAL MG/DL
HBA1C MFR BLD: 6.4 % (ref 4.8–5.6)
HCT VFR BLD AUTO: 50.3 % (ref 37.5–51)
HDLC SERPL QL: 3.28
HDLC SERPL-MCNC: 39 MG/DL (ref 40–60)
HGB BLD-MCNC: 17 G/DL (ref 13–17.7)
HGB UR QL STRIP.AUTO: NEGATIVE
KETONES UR QL STRIP: NEGATIVE
LDLC SERPL CALC-MCNC: 64 MG/DL (ref 0–100)
LEUKOCYTE ESTERASE UR QL STRIP.AUTO: NEGATIVE
MCH RBC QN AUTO: 29.6 PG (ref 26.6–33)
MCHC RBC AUTO-ENTMCNC: 33.8 G/DL (ref 31.5–35.7)
MCV RBC AUTO: 87.5 FL (ref 79–97)
MICROALBUMIN/CREAT UR: NORMAL MG/G{CREAT}
NITRITE UR QL STRIP: NEGATIVE
PH UR STRIP.AUTO: 6.5 [PH] (ref 5–8)
PLATELET # BLD AUTO: 279 10*3/MM3 (ref 140–450)
PMV BLD AUTO: 8.2 FL (ref 6–12)
POTASSIUM SERPL-SCNC: 4.8 MMOL/L (ref 3.5–5.2)
PROT SERPL-MCNC: 6.6 G/DL (ref 6–8.5)
PROT UR QL STRIP: NEGATIVE
PSA SERPL-MCNC: 0.76 NG/ML (ref 0–4)
RBC # BLD AUTO: 5.75 10*6/MM3 (ref 4.14–5.8)
SODIUM SERPL-SCNC: 135 MMOL/L (ref 136–145)
SP GR UR STRIP: 1.02 (ref 1–1.03)
TRIGL SERPL-MCNC: 141 MG/DL (ref 0–150)
TSH SERPL DL<=0.05 MIU/L-ACNC: 1.41 UIU/ML (ref 0.27–4.2)
UROBILINOGEN UR QL STRIP: ABNORMAL
VLDLC SERPL-MCNC: 25 MG/DL (ref 5–40)
WBC NRBC COR # BLD AUTO: 8.55 10*3/MM3 (ref 3.4–10.8)

## 2025-02-22 PROCEDURE — 81003 URINALYSIS AUTO W/O SCOPE: CPT | Performed by: NURSE PRACTITIONER

## 2025-02-22 PROCEDURE — 82043 UR ALBUMIN QUANTITATIVE: CPT | Performed by: NURSE PRACTITIONER

## 2025-02-22 PROCEDURE — 80061 LIPID PANEL: CPT | Performed by: NURSE PRACTITIONER

## 2025-02-22 PROCEDURE — 80050 GENERAL HEALTH PANEL: CPT | Performed by: NURSE PRACTITIONER

## 2025-02-22 PROCEDURE — G0103 PSA SCREENING: HCPCS | Performed by: NURSE PRACTITIONER

## 2025-02-22 PROCEDURE — 82570 ASSAY OF URINE CREATININE: CPT | Performed by: NURSE PRACTITIONER

## 2025-02-22 PROCEDURE — 83036 HEMOGLOBIN GLYCOSYLATED A1C: CPT | Performed by: NURSE PRACTITIONER

## 2025-02-27 ENCOUNTER — OFFICE VISIT (OUTPATIENT)
Dept: INTERNAL MEDICINE | Facility: CLINIC | Age: 65
End: 2025-02-27
Payer: COMMERCIAL

## 2025-02-27 VITALS
WEIGHT: 217.3 LBS | TEMPERATURE: 97.8 F | DIASTOLIC BLOOD PRESSURE: 70 MMHG | OXYGEN SATURATION: 95 % | HEIGHT: 73 IN | SYSTOLIC BLOOD PRESSURE: 110 MMHG | HEART RATE: 86 BPM | BODY MASS INDEX: 28.8 KG/M2

## 2025-02-27 DIAGNOSIS — Z12.2 ENCOUNTER FOR SCREENING FOR LUNG CANCER: ICD-10-CM

## 2025-02-27 DIAGNOSIS — G47.20 SLEEP PATTERN DISTURBANCE: Chronic | ICD-10-CM

## 2025-02-27 DIAGNOSIS — I10 ESSENTIAL HYPERTENSION: Chronic | ICD-10-CM

## 2025-02-27 DIAGNOSIS — E78.2 MIXED HYPERLIPIDEMIA: Chronic | ICD-10-CM

## 2025-02-27 DIAGNOSIS — I25.10 CORONARY ARTERY DISEASE INVOLVING NATIVE CORONARY ARTERY OF NATIVE HEART WITHOUT ANGINA PECTORIS: Chronic | ICD-10-CM

## 2025-02-27 DIAGNOSIS — Z00.00 ENCOUNTER FOR WELLNESS EXAMINATION IN ADULT: Primary | ICD-10-CM

## 2025-02-27 DIAGNOSIS — I25.2 HISTORY OF ST ELEVATION MYOCARDIAL INFARCTION (STEMI): ICD-10-CM

## 2025-02-27 DIAGNOSIS — Z95.5 S/P CORONARY ARTERY STENT PLACEMENT: Chronic | ICD-10-CM

## 2025-02-27 DIAGNOSIS — E11.9 TYPE 2 DIABETES MELLITUS WITHOUT COMPLICATION, WITHOUT LONG-TERM CURRENT USE OF INSULIN: Chronic | ICD-10-CM

## 2025-02-27 RX ORDER — ROSUVASTATIN CALCIUM 10 MG/1
1 TABLET, COATED ORAL DAILY
COMMUNITY

## 2025-02-27 NOTE — PROGRESS NOTES
DENNIS Ann is a 64 y.o. male presenting for Annual Exam    His current/chronic health conditions include:  Patient Active Problem List   Diagnosis    Coronary artery disease involving native coronary artery    Type 2 diabetes mellitus    Essential hypertension    AMIRA (obstructive sleep apnea)    Drug-induced erectile dysfunction    S/P coronary artery stent placement    HLD (hyperlipidemia)    Osteoarthritis of both knees    Rupture of anterior cruciate ligament of left knee    History of ST elevation myocardial infarction (STEMI)    Ischemic dilated cardiomyopathy    Allergic rhinitis    Moderate episode of recurrent major depressive disorder    Sleep pattern disturbance       Outpatient Medications Marked as Taking for the 2/27/25 encounter (Office Visit) with Joselyn Ross APRN   Medication Sig Dispense Refill    Ascorbic Acid (VITAMIN C PO) Take 3,000 Units by mouth.      aspirin 81 MG EC tablet Take 1 tablet by mouth Daily.      carvedilol (COREG) 3.125 MG tablet TAKE 1 TABLET BY MOUTH TWICE  DAILY WITH MEALS 180 tablet 3    Coenzyme Q10 (COQ10 PO) Take 250 mg by mouth 2 (Two) Times a Day.      dapagliflozin Propanediol (Farxiga) 10 MG tablet Take 10 mg by mouth Daily. 90 tablet 3    ezetimibe (ZETIA) 10 MG tablet Take 1 tablet by mouth Daily. 90 tablet 3    famotidine (PEPCID) 40 MG tablet TAKE 1 TABLET BY MOUTH DAILY 90 tablet 3    KRILL OIL PO Take  by mouth.      melatonin 3 MG tablet Take 20 mg by mouth Every Night.      metFORMIN (GLUCOPHAGE) 500 MG tablet TAKE 1 TABLET BY MOUTH TWICE  DAILY 180 tablet 3    montelukast (SINGULAIR) 10 MG tablet TAKE 1 TABLET BY MOUTH AT NIGHT 90 tablet 3    omega-3 acid ethyl esters (LOVAZA) 1 g capsule TAKE 2 CAPSULES BY MOUTH TWICE  DAILY 360 capsule 3    pitavastatin calcium (LIVALO) 2 MG tablet tablet TAKE 1 TABLET BY MOUTH ONCE  DAILY AT NIGHT 90 tablet 3    rosuvastatin (Crestor) 10 MG tablet Take 1 tablet by mouth Daily.      tadalafil (CIALIS) 5 MG  "tablet TAKE ONE TABLET BY MOUTH DAILY 10 tablet 0    traZODone (DESYREL) 50 MG tablet Take 1-2 tablets by mouth every night at bedtime. 180 tablet 3          Health Habits:  Nutrition: \"[employer]\" organic, plant-based, eliminating processed foods    Screenings:  PSA: 0.757  Colon Cancer: negative Cologuard 2024  Tob use: previous smoker; due for LDCT         Additional E&M Note during same encounter follows:  Patient has additional, significant, and separately identifiable condition(s)/problem(s) that require work above and beyond the Annual Wellness Visit      He presents for ongoing management of his chronic health conditions.    These include HTN, HLD, DM, CAD, prior MI s/p stenting, ischemic cardiomyopathy, insomnia, and ED. He reports his is doing well. He is tolerating his current medication regimen w/o SEs.          The patient's allergies, current medications, problem list, past medical history, past family history, and past social history were reviewed and updated as appropriate.        OBJECTIVE    Vitals:    02/27/25 0932   BP: 110/70   BP Location: Left arm   Patient Position: Sitting   Cuff Size: Adult   Pulse: 86   Temp: 97.8 °F (36.6 °C)   TempSrc: Infrared   SpO2: 95%   Weight: 98.6 kg (217 lb 4.8 oz)   Height: 185.4 cm (73\")       BP Readings from Last 3 Encounters:   02/27/25 110/70   07/17/24 116/68   02/06/24 130/76       Wt Readings from Last 3 Encounters:   02/27/25 98.6 kg (217 lb 4.8 oz)   07/17/24 96 kg (211 lb 9.6 oz)   02/06/24 97.5 kg (215 lb)       Body mass index is 28.67 kg/m².  Nursing notes and vital signs reviewed.    Physical Exam  Constitutional:       General: He is not in acute distress.     Appearance: Normal appearance. He is well-developed.   HENT:      Head: Normocephalic.      Right Ear: Hearing, tympanic membrane, ear canal and external ear normal.      Left Ear: Hearing, tympanic membrane, ear canal and external ear normal.      Nose: Nose normal. No mucosal edema " or rhinorrhea.      Mouth/Throat:      Mouth: Mucous membranes are moist.      Pharynx: Oropharynx is clear. Uvula midline.   Eyes:      General: Lids are normal.      Extraocular Movements: Extraocular movements intact.      Conjunctiva/sclera: Conjunctivae normal.      Pupils: Pupils are equal, round, and reactive to light.   Neck:      Thyroid: No thyroid mass or thyromegaly.   Cardiovascular:      Rate and Rhythm: Regular rhythm.      Pulses: Normal pulses.      Heart sounds: S1 normal and S2 normal. No murmur heard.     No friction rub. No gallop.   Pulmonary:      Effort: Pulmonary effort is normal.      Breath sounds: Normal breath sounds. No wheezing, rhonchi or rales.   Abdominal:      General: Bowel sounds are normal.      Palpations: Abdomen is soft.      Tenderness: There is no abdominal tenderness. There is no guarding.      Hernia: No hernia is present.   Musculoskeletal:         General: No deformity. Normal range of motion.      Cervical back: Normal range of motion and neck supple.   Lymphadenopathy:      Cervical: No cervical adenopathy.   Skin:     General: Skin is warm and dry.      Findings: No lesion or rash.   Neurological:      General: No focal deficit present.      Mental Status: He is alert and oriented to person, place, and time.      Cranial Nerves: No cranial nerve deficit.      Sensory: No sensory deficit.      Motor: Motor function is intact.      Coordination: Coordination is intact.      Gait: Gait normal.      Deep Tendon Reflexes: Reflexes are normal and symmetric.   Psychiatric:         Attention and Perception: He is attentive.         Mood and Affect: Mood and affect normal.         Speech: Speech normal.         Behavior: Behavior normal.         Thought Content: Thought content normal.           Data Reviewed:    Recent Results (from the past 4 weeks)   Lipid Panel With / Chol / HDL Ratio    Collection Time: 02/22/25  7:28 AM    Specimen: Blood   Result Value Ref Range     Total Cholesterol 128 0 - 200 mg/dL    Triglycerides 141 0 - 150 mg/dL    HDL Cholesterol 39 (L) 40 - 60 mg/dL    LDL Cholesterol  64 0 - 100 mg/dL    VLDL Cholesterol 25 5 - 40 mg/dL    Chol/HDL Ratio 3.28    Hemoglobin A1c    Collection Time: 02/22/25  7:28 AM    Specimen: Blood   Result Value Ref Range    Hemoglobin A1C 6.40 (H) 4.80 - 5.60 %   Comprehensive Metabolic Panel    Collection Time: 02/22/25  7:28 AM    Specimen: Blood   Result Value Ref Range    Glucose 102 (H) 65 - 99 mg/dL    BUN 11 8 - 23 mg/dL    Creatinine 1.00 0.76 - 1.27 mg/dL    Sodium 135 (L) 136 - 145 mmol/L    Potassium 4.8 3.5 - 5.2 mmol/L    Chloride 100 98 - 107 mmol/L    CO2 25.0 22.0 - 29.0 mmol/L    Calcium 9.6 8.6 - 10.5 mg/dL    Total Protein 6.6 6.0 - 8.5 g/dL    Albumin 4.3 3.5 - 5.2 g/dL    ALT (SGPT) 20 1 - 41 U/L    AST (SGOT) 25 1 - 40 U/L    Alkaline Phosphatase 60 39 - 117 U/L    Total Bilirubin 0.7 0.0 - 1.2 mg/dL    Globulin 2.3 gm/dL    A/G Ratio 1.9 g/dL    BUN/Creatinine Ratio 11.0 7.0 - 25.0    Anion Gap 10.0 5.0 - 15.0 mmol/L    eGFR 84.0 >60.0 mL/min/1.73   CBC (No Diff)    Collection Time: 02/22/25  7:28 AM    Specimen: Blood   Result Value Ref Range    WBC 8.55 3.40 - 10.80 10*3/mm3    RBC 5.75 4.14 - 5.80 10*6/mm3    Hemoglobin 17.0 13.0 - 17.7 g/dL    Hematocrit 50.3 37.5 - 51.0 %    MCV 87.5 79.0 - 97.0 fL    MCH 29.6 26.6 - 33.0 pg    MCHC 33.8 31.5 - 35.7 g/dL    RDW 13.8 12.3 - 15.4 %    RDW-SD 44.2 37.0 - 54.0 fl    MPV 8.2 6.0 - 12.0 fL    Platelets 279 140 - 450 10*3/mm3   TSH    Collection Time: 02/22/25  7:28 AM    Specimen: Blood   Result Value Ref Range    TSH 1.410 0.270 - 4.200 uIU/mL   PSA Screen    Collection Time: 02/22/25  7:28 AM    Specimen: Blood   Result Value Ref Range    PSA 0.757 0.000 - 4.000 ng/mL   Urinalysis without microscopic (no culture) - Urine, Clean Catch    Collection Time: 02/22/25  7:32 AM    Specimen: Urine, Clean Catch   Result Value Ref Range    Color, UA Dark Yellow (A)  Yellow, Straw    Appearance, UA Clear Clear    pH, UA 6.5 5.0 - 8.0    Specific Gravity, UA 1.018 1.005 - 1.030    Glucose, UA >=1000 mg/dL (3+) (A) Negative    Ketones, UA Negative Negative    Bilirubin, UA Negative Negative    Blood, UA Negative Negative    Protein, UA Negative Negative    Leuk Esterase, UA Negative Negative    Nitrite, UA Negative Negative    Urobilinogen, UA 0.2 E.U./dL 0.2 - 1.0 E.U./dL   Microalbumin / Creatinine Urine Ratio - Urine, Clean Catch    Collection Time: 02/22/25  7:32 AM    Specimen: Urine, Clean Catch   Result Value Ref Range    Microalbumin/Creatinine Ratio      Creatinine, Urine 55.7 mg/dL    Microalbumin, Urine <1.2 mg/dL         Office Visit with Pam Calderon APRN (07/17/2024)       Assessment & Plan  Encounter for wellness examination in adult         Encounter for screening for lung cancer    Orders:     CT Chest Low Dose Cancer Screening WO; Future    Essential hypertension  - continue current regimen         Mixed hyperlipidemia  - continue current regimen         Coronary artery disease involving native coronary artery of native heart without angina pectoris           History of ST elevation myocardial infarction (STEMI)         S/P coronary artery stent placement         Type 2 diabetes mellitus without complication, without long-term current use of insulin  - continue current regimen         Sleep pattern disturbance  - continue current regimen             Preventative counseling completed including relevant screenings, appropriate vaccinations, healthy nutrition, and appropriate physical activity. Age-appropriate Screening & Interventions recommended by USPSTF were reviewed with the patient, and Health Maintenance was updated in Epic.  BMI is >= 25 and <30. (Overweight) The following options were offered after discussion;: nutrition counseling/recommendations            Medications, including side effects, were discussed with the patient. Patient verbalized  understanding.  The plan of care was discussed. All questions were answered. Patient verbalized understanding.        Return in about 6 months (around 8/27/2025) for fasting labs one week prior to.

## 2025-03-10 DIAGNOSIS — E78.5 HYPERLIPIDEMIA, UNSPECIFIED HYPERLIPIDEMIA TYPE: ICD-10-CM

## 2025-03-11 RX ORDER — MONTELUKAST SODIUM 10 MG/1
10 TABLET ORAL
Qty: 90 TABLET | Refills: 3 | Status: SHIPPED | OUTPATIENT
Start: 2025-03-11

## 2025-03-11 RX ORDER — OMEGA-3-ACID ETHYL ESTERS 1 G/1
2 CAPSULE, LIQUID FILLED ORAL EVERY 12 HOURS SCHEDULED
Qty: 360 CAPSULE | Refills: 3 | Status: SHIPPED | OUTPATIENT
Start: 2025-03-11

## 2025-03-17 ENCOUNTER — OFFICE VISIT (OUTPATIENT)
Age: 65
End: 2025-03-17
Payer: COMMERCIAL

## 2025-03-17 ENCOUNTER — HOSPITAL ENCOUNTER (OUTPATIENT)
Dept: CARDIOLOGY | Facility: HOSPITAL | Age: 65
Discharge: HOME | End: 2025-03-17
Admitting: NURSE PRACTITIONER
Payer: COMMERCIAL

## 2025-03-17 VITALS
HEART RATE: 83 BPM | BODY MASS INDEX: 28.36 KG/M2 | WEIGHT: 214 LBS | DIASTOLIC BLOOD PRESSURE: 82 MMHG | HEIGHT: 73 IN | SYSTOLIC BLOOD PRESSURE: 134 MMHG

## 2025-03-17 VITALS
SYSTOLIC BLOOD PRESSURE: 134 MMHG | DIASTOLIC BLOOD PRESSURE: 82 MMHG | BODY MASS INDEX: 28.76 KG/M2 | WEIGHT: 217 LBS | HEIGHT: 73 IN

## 2025-03-17 DIAGNOSIS — I10 ESSENTIAL HYPERTENSION: ICD-10-CM

## 2025-03-17 DIAGNOSIS — I25.5 ISCHEMIC CARDIOMYOPATHY: ICD-10-CM

## 2025-03-17 DIAGNOSIS — I25.5 ISCHEMIC CARDIOMYOPATHY: Primary | ICD-10-CM

## 2025-03-17 DIAGNOSIS — E78.2 MIXED HYPERLIPIDEMIA: ICD-10-CM

## 2025-03-17 DIAGNOSIS — Z95.5 S/P CORONARY ARTERY STENT PLACEMENT: ICD-10-CM

## 2025-03-17 LAB
AORTIC DIMENSIONLESS INDEX: 0.93 (DI)
ASCENDING AORTA: 4 CM
AV MEAN PRESS GRAD SYS DOP V1V2: 2.8 MMHG
AV VMAX SYS DOP: 115.4 CM/SEC
BH CV ECHO MEAS - ACS: 1.68 CM
BH CV ECHO MEAS - AI P1/2T: 1191 MSEC
BH CV ECHO MEAS - AO MAX PG: 5.3 MMHG
BH CV ECHO MEAS - AO ROOT DIAM: 4.4 CM
BH CV ECHO MEAS - AO V2 VTI: 23.2 CM
BH CV ECHO MEAS - AVA(I,D): 3 CM2
BH CV ECHO MEAS - EDV(CUBED): 166.9 ML
BH CV ECHO MEAS - EDV(MOD-SP2): 285 ML
BH CV ECHO MEAS - EDV(MOD-SP4): 311 ML
BH CV ECHO MEAS - EF(MOD-SP2): 36.1 %
BH CV ECHO MEAS - EF(MOD-SP4): 38.6 %
BH CV ECHO MEAS - ESV(CUBED): 37.4 ML
BH CV ECHO MEAS - ESV(MOD-SP2): 182 ML
BH CV ECHO MEAS - ESV(MOD-SP4): 191 ML
BH CV ECHO MEAS - FS: 39.3 %
BH CV ECHO MEAS - IVS/LVPW: 1 CM
BH CV ECHO MEAS - IVSD: 0.97 CM
BH CV ECHO MEAS - LAT PEAK E' VEL: 6.4 CM/SEC
BH CV ECHO MEAS - LV MASS(C)D: 203.9 GRAMS
BH CV ECHO MEAS - LV MAX PG: 4.9 MMHG
BH CV ECHO MEAS - LV MEAN PG: 2.4 MMHG
BH CV ECHO MEAS - LV SYSTOLIC VOL/BSA (12-30): 85.7 CM2
BH CV ECHO MEAS - LV V1 MAX: 110.3 CM/SEC
BH CV ECHO MEAS - LV V1 VTI: 21.6 CM
BH CV ECHO MEAS - LVIDD: 5.5 CM
BH CV ECHO MEAS - LVIDS: 3.3 CM
BH CV ECHO MEAS - LVOT AREA: 3.3 CM2
BH CV ECHO MEAS - LVOT DIAM: 2.04 CM
BH CV ECHO MEAS - LVPWD: 0.97 CM
BH CV ECHO MEAS - MED PEAK E' VEL: 5.4 CM/SEC
BH CV ECHO MEAS - MV A DUR: 0.13 SEC
BH CV ECHO MEAS - MV A MAX VEL: 70.3 CM/SEC
BH CV ECHO MEAS - MV DEC SLOPE: 451.4 CM/SEC2
BH CV ECHO MEAS - MV DEC TIME: 0.17 SEC
BH CV ECHO MEAS - MV E MAX VEL: 59.6 CM/SEC
BH CV ECHO MEAS - MV E/A: 0.85
BH CV ECHO MEAS - MV MAX PG: 3.1 MMHG
BH CV ECHO MEAS - MV MEAN PG: 1.49 MMHG
BH CV ECHO MEAS - MV P1/2T: 55.2 MSEC
BH CV ECHO MEAS - MV V2 VTI: 20.9 CM
BH CV ECHO MEAS - MVA(P1/2T): 4 CM2
BH CV ECHO MEAS - MVA(VTI): 3.4 CM2
BH CV ECHO MEAS - PA ACC TIME: 0.16 SEC
BH CV ECHO MEAS - PA V2 MAX: 69.2 CM/SEC
BH CV ECHO MEAS - RAP SYSTOLE: 3 MMHG
BH CV ECHO MEAS - RV MAX PG: 0.32 MMHG
BH CV ECHO MEAS - RV V1 MAX: 28.3 CM/SEC
BH CV ECHO MEAS - RV V1 VTI: 5.4 CM
BH CV ECHO MEAS - SV(LVOT): 70.5 ML
BH CV ECHO MEAS - SV(MOD-SP2): 103 ML
BH CV ECHO MEAS - SV(MOD-SP4): 120 ML
BH CV ECHO MEAS - SVI(LVOT): 31.6 ML/M2
BH CV ECHO MEAS - SVI(MOD-SP2): 46.2 ML/M2
BH CV ECHO MEAS - SVI(MOD-SP4): 53.9 ML/M2
BH CV ECHO MEAS - TAPSE (>1.6): 2.24 CM
BH CV ECHO MEASUREMENTS AVERAGE E/E' RATIO: 10.1
BH CV XLRA - RV BASE: 3.8 CM
BH CV XLRA - RV LENGTH: 8.9 CM
BH CV XLRA - TDI S': 10.2 CM/SEC
LEFT ATRIUM VOLUME INDEX: 40.1 ML/M2
LV EF BIPLANE MOD: 36.4 %
SINUS: 4 CM
STJ: 2.7 CM

## 2025-03-17 PROCEDURE — 93306 TTE W/DOPPLER COMPLETE: CPT | Performed by: STUDENT IN AN ORGANIZED HEALTH CARE EDUCATION/TRAINING PROGRAM

## 2025-03-17 PROCEDURE — 25510000001 PERFLUTREN 6.52 MG/ML SUSPENSION 2 ML VIAL: Performed by: NURSE PRACTITIONER

## 2025-03-17 PROCEDURE — 93306 TTE W/DOPPLER COMPLETE: CPT

## 2025-03-17 PROCEDURE — 99214 OFFICE O/P EST MOD 30 MIN: CPT | Performed by: INTERNAL MEDICINE

## 2025-03-17 PROCEDURE — 93000 ELECTROCARDIOGRAM COMPLETE: CPT | Performed by: INTERNAL MEDICINE

## 2025-03-17 RX ADMIN — PERFLUTREN 2 ML: 6.52 INJECTION, SUSPENSION INTRAVENOUS at 12:25

## 2025-03-17 NOTE — PROGRESS NOTES
Date of Office Visit: 25    Encounter Provider: José Miguel Yates MD  Place of Service: [facility] CARDIOLOGY  Patient Name: Seth Manuel  :1960    Chief Complaint   Patient presents with    Coronary Artery Disease    Cardiomyopathy    Follow-up     6 month       HPI:   Seth Manuel is a 64 y.o. male with a medical history of coronary artery disease with previous anterior ST elevation MI in .  At that time, he had an occluded LAD and 4.0 x 12 mm Resolute drug-eluting stent was placed.  Patient also was found to have cardiomyopathy and wore LifeVest for short period of time.  He underwent cardiac rehab and his EF improved to 45%.  Patient also has history of hypertension, hyperlipidemia, diabetes type 2 and AMIRA (unable to tolerate CPAP).   His last transthoracic echocardiogram which was done on 2022 was ejection fraction was slightly down from prior at 35 to 40% with anterior wall and apical hypokinesis along with mild mitral and aortic valve regurgitation.  He was scheduled for a follow-up transthoracic echocardiogram today the looks stable with an ejection fraction of around 35 to 40% with similar wall motion abnormalities.  No significant valvular regurgitation noted.  He continues on aspirin, carvedilol, Crestor, and Farxiga.    He denies any chest pain orthopnea or PND.  Lipid panel has been well-controlled.  Hemoglobin A1c mildly elevated.    Previous testing and notes have been reviewed by me.   Past Medical History:   Diagnosis Date    Allergic rhinitis 10/27/2017    Arthritis     Coronary artery disease     As noted in medical records    Diabetes mellitus     Erectile dysfunction     Hyperlipidemia     Hypertension     Myocardial infarction 2014    Sleep apnea     has a C-Pap but doesn't use       Past Surgical History:   Procedure Laterality Date    BICEPS TENDON REPAIR      CORONARY STENT PLACEMENT      [employer] Resolute stent into  LAD    HERNIA REPAIR  2008    Umbilical     TONSILLECTOMY      VASECTOMY      As noted in medical records       Social History     Socioeconomic History    Marital status:    Tobacco Use    Smoking status: Former     Current packs/day: 0.00     Average packs/day: 1 pack/day for 41.0 years (41.0 ttl pk-yrs)     Types: Cigarettes     Start date: 1/1/1974     Quit date: 1/1/2015     Years since quitting: 10.2     Passive exposure: Past    Smokeless tobacco: Never   Vaping Use    Vaping status: Never Used   Substance and Sexual Activity    Alcohol use: Yes     Alcohol/week: 6.0 standard drinks of alcohol     Types: 6 Cans of beer per week     Comment: beer 2-3 days/week    Drug use: No    Sexual activity: Defer     Partners: Female     Birth control/protection: None       Family History   Problem Relation Age of Onset    Hypertension Mother     Diabetes Mother     Graves' disease Mother     Rheum arthritis Mother     Heart attack Mother 50    Hypertension Father     Cancer Father         Throat    Stroke Father 76    Heart attack Father 50    Diabetes Brother     Lung cancer Paternal Uncle     Rheum arthritis Maternal Grandmother     Heart disease Maternal Grandfather     Diabetes Paternal Grandmother     Heart disease Paternal Grandfather        Review of Systems   Constitutional: Negative. Negative for fever and malaise/fatigue.   HENT: Negative.  Negative for nosebleeds and sore throat.    Eyes: Negative.  Negative for blurred vision and double vision.   Cardiovascular: Negative.  Negative for chest pain, claudication, palpitations and syncope.   Respiratory: Negative.  Negative for cough, shortness of breath and snoring.    Endocrine: Negative.  Negative for cold intolerance, heat intolerance and polydipsia.   Hematologic/Lymphatic: Negative.    Skin: Negative.  Negative for itching, poor wound healing and rash.   Musculoskeletal: Negative.  Negative for joint pain, joint swelling, muscle weakness and  myalgias.   Gastrointestinal: Negative.  Negative for abdominal pain, melena, nausea and vomiting.   Genitourinary: Negative.    Neurological: Negative.  Negative for light-headedness, loss of balance, seizures, vertigo and weakness.   Psychiatric/Behavioral: Negative.  Negative for altered mental status and depression.    Allergic/Immunologic: Negative.        Allergies   Allergen Reactions    Crestor [Rosuvastatin Calcium] Myalgia    Lipitor [Atorvastatin] Myalgia         Current Outpatient Medications:     Ascorbic Acid (VITAMIN C PO), Take 3,000 Units by mouth., Disp: , Rfl:     aspirin 81 MG EC tablet, Take 1 tablet by mouth Daily., Disp: , Rfl:     carvedilol (COREG) 3.125 MG tablet, TAKE 1 TABLET BY MOUTH TWICE  DAILY WITH MEALS, Disp: 180 tablet, Rfl: 3    Coenzyme Q10 (COQ10 PO), Take 250 mg by mouth 2 (Two) Times a Day., Disp: , Rfl:     dapagliflozin Propanediol (Farxiga) 10 MG tablet, Take 10 mg by mouth Daily., Disp: 90 tablet, Rfl: 3    ezetimibe (ZETIA) 10 MG tablet, Take 1 tablet by mouth Daily., Disp: 90 tablet, Rfl: 3    famotidine (PEPCID) 40 MG tablet, TAKE 1 TABLET BY MOUTH DAILY, Disp: 90 tablet, Rfl: 3    KRILL OIL PO, Take  by mouth., Disp: , Rfl:     melatonin 3 MG tablet, Take 20 mg by mouth Every Night., Disp: , Rfl:     metFORMIN (GLUCOPHAGE) 500 MG tablet, TAKE 1 TABLET BY MOUTH TWICE  DAILY, Disp: 180 tablet, Rfl: 3    montelukast (SINGULAIR) 10 MG tablet, TAKE 1 TABLET BY MOUTH AT NIGHT, Disp: 90 tablet, Rfl: 3    omega-3 acid ethyl esters (LOVAZA) 1 g capsule, TAKE 2 CAPSULES BY MOUTH TWICE  DAILY, Disp: 360 capsule, Rfl: 3    pitavastatin calcium (LIVALO) 2 MG tablet tablet, TAKE 1 TABLET BY MOUTH ONCE  DAILY AT NIGHT, Disp: 90 tablet, Rfl: 3    rosuvastatin (Crestor) 10 MG tablet, Take 1 tablet by mouth Daily., Disp: , Rfl:     tadalafil (CIALIS) 5 MG tablet, TAKE ONE TABLET BY MOUTH DAILY, Disp: 10 tablet, Rfl: 0    traZODone (DESYREL) 50 MG tablet, Take 1-2 tablets by mouth every  "night at bedtime., Disp: 180 tablet, Rfl: 3  No current facility-administered medications for this visit.      Objective:     Vitals:    03/17/25 1256   BP: 134/82   Pulse: 83   Weight: 97.1 kg (214 lb)   Height: 185.4 cm (73\")     Body mass index is 28.23 kg/m².    PHYSICAL EXAM:    Constitutional:       Appearance: Healthy appearance. Not in distress.   Neck:      Vascular: No JVR. JVD normal.   Pulmonary:      Effort: Pulmonary effort is normal.      Breath sounds: Normal breath sounds. No wheezing. No rhonchi. No rales.   Chest:      Chest wall: Not tender to palpatation.   Cardiovascular:      PMI at left midclavicular line. Normal rate. Regular rhythm. Normal S1. Normal S2.       Murmurs: There is no murmur.      No gallop.  No click. No rub.   Pulses:     Intact distal pulses.   Edema:     Peripheral edema absent.   Abdominal:      General: Bowel sounds are normal.      Palpations: Abdomen is soft.      Tenderness: There is no abdominal tenderness.   Musculoskeletal: Normal range of motion.         General: No tenderness. Skin:     General: Skin is warm and dry.   Neurological:      General: No focal deficit present.      Mental Status: Alert and oriented to person, place and time.           ECG 12 Lead    Date/Time: 3/17/2025 1:25 PM  Performed by: José Miguel Yates MD    Authorized by: José Miguel Yates MD  Comparison: compared with previous ECG from 7/17/2024  Similar to previous ECG  Rhythm: sinus rhythm  Rate: normal  QRS axis: normal    Clinical impression: abnormal EKG  Comments: Anterior infarct          2D Echocardiogram 04/28/2022:  The left ventricular cavity is moderately dilated.  Left ventricular ejection fraction appears to be 36 - 40%.  The following left ventricular wall segments are hypokinetic: mid anterior, mid inferoseptal and mid anteroseptal. The following left ventricular wall segments are akinetic: apical anterior, apical lateral, apical inferior, apical septal and " apex.  Left atrial volume is moderately increased.  There is mild, bileaflet mitral valve thickening present.  Mild mitral valve regurgitation is present.  Mild aortic valve regurgitation is present.      Assessment:      Plan:      1.  Coronary artery disease and anterior STEMI status post PCI with RUIZ to LAD in 2014  -Continues aspirin 81 mg p.o. daily along with Livalo therapy.  Tolerating well.  LDL controlled.  Continue carvedilol at current dose.    2.  Ischemic cardiomyopathy: Euvolemic.  New York Heart Association class I  -Left ventricular ejection fraction appears to be stable at around 35 to 40%.  He continues on carvedilol, Farxiga, and aspirin therapy.  We had discussed Entresto therapy and he does not want to start new medication.    3.  Hyperlipidemia goal LDL less than 70: Lipid panel well-controlled.  No changes in current medicine.  Labs reviewed.    4.  Diabetes type 2: Continues with dietary modification and metformin therapy along with Farxiga.    5. AMIRA: Will to tolerate CPAP           Your medication list            Accurate as of March 17, 2025  1:07 PM. If you have any questions, ask your nurse or doctor.                CONTINUE taking these medications        Instructions Last Dose Given Next Dose Due   aspirin 81 MG EC tablet      Take 1 tablet by mouth Daily.       carvedilol 3.125 MG tablet  Commonly known as: COREG      TAKE 1 TABLET BY MOUTH TWICE  DAILY WITH MEALS       COQ10 PO      Take 250 mg by mouth 2 (Two) Times a Day.       Crestor 10 MG tablet  Generic drug: rosuvastatin      Take 1 tablet by mouth Daily.       dapagliflozin Propanediol 10 MG tablet  Commonly known as: Farxiga      Take 10 mg by mouth Daily.       ezetimibe 10 MG tablet  Commonly known as: ZETIA      Take 1 tablet by mouth Daily.       famotidine 40 MG tablet  Commonly known as: PEPCID      TAKE 1 TABLET BY MOUTH DAILY       KRILL OIL PO      Take  by mouth.       melatonin 3 MG tablet      Take 20 mg by mouth  Every Night.       metFORMIN 500 MG tablet  Commonly known as: GLUCOPHAGE      TAKE 1 TABLET BY MOUTH TWICE  DAILY       montelukast 10 MG tablet  Commonly known as: SINGULAIR      TAKE 1 TABLET BY MOUTH AT NIGHT       omega-3 acid ethyl esters 1 g capsule  Commonly known as: LOVAZA      TAKE 2 CAPSULES BY MOUTH TWICE  DAILY       pitavastatin calcium 2 MG tablet tablet  Commonly known as: LIVALO      TAKE 1 TABLET BY MOUTH ONCE  DAILY AT NIGHT       tadalafil 5 MG tablet  Commonly known as: CIALIS      TAKE ONE TABLET BY MOUTH DAILY       traZODone 50 MG tablet  Commonly known as: DESYREL      Take 1-2 tablets by mouth every night at bedtime.       VITAMIN C PO      Take 3,000 Units by mouth.

## 2025-03-26 ENCOUNTER — HOSPITAL ENCOUNTER (OUTPATIENT)
Dept: CT IMAGING | Facility: HOSPITAL | Age: 65
Discharge: HOME | End: 2025-03-26
Admitting: NURSE PRACTITIONER
Payer: COMMERCIAL

## 2025-03-26 DIAGNOSIS — Z12.2 ENCOUNTER FOR SCREENING FOR LUNG CANCER: ICD-10-CM

## 2025-03-26 PROCEDURE — 71271 CT THORAX LUNG CANCER SCR C-: CPT

## 2025-04-01 DIAGNOSIS — E11.9 TYPE 2 DIABETES MELLITUS WITHOUT COMPLICATION, WITHOUT LONG-TERM CURRENT USE OF INSULIN: ICD-10-CM

## 2025-04-01 NOTE — TELEPHONE ENCOUNTER
Rx Refill Note  Requested Prescriptions     Pending Prescriptions Disp Refills    metFORMIN (GLUCOPHAGE) 500 MG tablet [Pharmacy Med Name: metFORMIN HCl 500 MG Oral Tablet] 180 tablet 3     Sig: TAKE 1 TABLET BY MOUTH TWICE  DAILY      Last office visit with prescribing clinician: 2/27/2025   Last telemedicine visit with prescribing clinician: Visit date not found   Next office visit with prescribing clinician: 9/4/2025                         Would you like a call back once the refill request has been completed: [] Yes [] No    If the office needs to give you a call back, can they leave a voicemail: [] Yes [] No    Shreya Rivera MA  04/01/25, 11:54 EDT

## 2025-06-04 RX ORDER — DAPAGLIFLOZIN 10 MG/1
10 TABLET, FILM COATED ORAL DAILY
Qty: 90 TABLET | Refills: 3 | Status: SHIPPED | OUTPATIENT
Start: 2025-06-04

## 2025-06-04 RX ORDER — EZETIMIBE 10 MG/1
10 TABLET ORAL DAILY
Qty: 90 TABLET | Refills: 3 | Status: SHIPPED | OUTPATIENT
Start: 2025-06-04

## 2025-08-19 ENCOUNTER — TELEPHONE (OUTPATIENT)
Dept: INTERNAL MEDICINE | Facility: CLINIC | Age: 65
End: 2025-08-19
Payer: COMMERCIAL

## 2025-08-19 DIAGNOSIS — I10 ESSENTIAL HYPERTENSION: Primary | Chronic | ICD-10-CM

## 2025-08-19 DIAGNOSIS — E11.9 TYPE 2 DIABETES MELLITUS WITHOUT COMPLICATION, WITHOUT LONG-TERM CURRENT USE OF INSULIN: Chronic | ICD-10-CM

## 2025-08-19 DIAGNOSIS — E78.2 MIXED HYPERLIPIDEMIA: Chronic | ICD-10-CM

## 2025-08-26 ENCOUNTER — TELEPHONE (OUTPATIENT)
Dept: INTERNAL MEDICINE | Facility: CLINIC | Age: 65
End: 2025-08-26
Payer: COMMERCIAL

## 2025-08-27 ENCOUNTER — LAB (OUTPATIENT)
Dept: LAB | Facility: HOSPITAL | Age: 65
End: 2025-08-27
Payer: COMMERCIAL

## 2025-08-27 ENCOUNTER — TELEPHONE (OUTPATIENT)
Dept: INTERNAL MEDICINE | Facility: CLINIC | Age: 65
End: 2025-08-27
Payer: COMMERCIAL

## 2025-08-27 DIAGNOSIS — I10 ESSENTIAL HYPERTENSION: Chronic | ICD-10-CM

## 2025-08-27 DIAGNOSIS — Z95.5 S/P CORONARY ARTERY STENT PLACEMENT: Chronic | ICD-10-CM

## 2025-08-27 DIAGNOSIS — E11.9 TYPE 2 DIABETES MELLITUS WITHOUT COMPLICATION, WITHOUT LONG-TERM CURRENT USE OF INSULIN: Chronic | ICD-10-CM

## 2025-08-27 DIAGNOSIS — Z95.5 S/P CORONARY ARTERY STENT PLACEMENT: Primary | Chronic | ICD-10-CM

## 2025-08-27 DIAGNOSIS — E78.2 MIXED HYPERLIPIDEMIA: Chronic | ICD-10-CM

## 2025-08-27 PROCEDURE — 83036 HEMOGLOBIN GLYCOSYLATED A1C: CPT | Performed by: NURSE PRACTITIONER

## 2025-08-27 PROCEDURE — 80061 LIPID PANEL: CPT | Performed by: NURSE PRACTITIONER

## 2025-08-27 PROCEDURE — 80053 COMPREHEN METABOLIC PANEL: CPT | Performed by: NURSE PRACTITIONER
